# Patient Record
Sex: FEMALE | Race: WHITE | NOT HISPANIC OR LATINO | Employment: FULL TIME | URBAN - METROPOLITAN AREA
[De-identification: names, ages, dates, MRNs, and addresses within clinical notes are randomized per-mention and may not be internally consistent; named-entity substitution may affect disease eponyms.]

---

## 2017-01-01 ENCOUNTER — TRANSFERRED RECORDS (OUTPATIENT)
Dept: HEALTH INFORMATION MANAGEMENT | Facility: CLINIC | Age: 22
End: 2017-01-01

## 2017-01-01 LAB — PAP SMEAR - HIM PATIENT REPORTED: NEGATIVE

## 2017-04-06 ENCOUNTER — OFFICE VISIT (OUTPATIENT)
Dept: FAMILY MEDICINE | Facility: CLINIC | Age: 22
End: 2017-04-06
Payer: COMMERCIAL

## 2017-04-06 VITALS
TEMPERATURE: 97.4 F | WEIGHT: 213 LBS | DIASTOLIC BLOOD PRESSURE: 70 MMHG | SYSTOLIC BLOOD PRESSURE: 118 MMHG | BODY MASS INDEX: 36.68 KG/M2 | HEART RATE: 86 BPM | RESPIRATION RATE: 16 BRPM

## 2017-04-06 DIAGNOSIS — F41.1 GENERALIZED ANXIETY DISORDER: ICD-10-CM

## 2017-04-06 DIAGNOSIS — J45.30 MILD PERSISTENT ASTHMA WITHOUT COMPLICATION: ICD-10-CM

## 2017-04-06 DIAGNOSIS — A74.9 CHLAMYDIA INFECTION: ICD-10-CM

## 2017-04-06 DIAGNOSIS — J30.2 SEASONAL ALLERGIC RHINITIS, UNSPECIFIED ALLERGIC RHINITIS TRIGGER: ICD-10-CM

## 2017-04-06 DIAGNOSIS — F43.0 ACUTE REACTION TO STRESS: ICD-10-CM

## 2017-04-06 DIAGNOSIS — F33.0 MAJOR DEPRESSIVE DISORDER, RECURRENT EPISODE, MILD (H): Primary | ICD-10-CM

## 2017-04-06 PROCEDURE — 99214 OFFICE O/P EST MOD 30 MIN: CPT | Performed by: PHYSICIAN ASSISTANT

## 2017-04-06 PROCEDURE — 87491 CHLMYD TRACH DNA AMP PROBE: CPT | Performed by: PHYSICIAN ASSISTANT

## 2017-04-06 RX ORDER — VENLAFAXINE HYDROCHLORIDE 37.5 MG/1
37.5 CAPSULE, EXTENDED RELEASE ORAL DAILY
Qty: 14 CAPSULE | Refills: 0 | Status: SHIPPED | OUTPATIENT
Start: 2017-04-06 | End: 2017-04-11 | Stop reason: ALTCHOICE

## 2017-04-06 RX ORDER — ALBUTEROL SULFATE 90 UG/1
2 AEROSOL, METERED RESPIRATORY (INHALATION) EVERY 6 HOURS PRN
Qty: 2 INHALER | Refills: 3 | Status: SHIPPED | OUTPATIENT
Start: 2017-04-06 | End: 2019-03-06

## 2017-04-06 RX ORDER — FEXOFENADINE HCL 180 MG/1
180 TABLET ORAL DAILY
Qty: 90 TABLET | Refills: 3 | Status: SHIPPED | OUTPATIENT
Start: 2017-04-06 | End: 2018-01-16

## 2017-04-06 RX ORDER — ALBUTEROL SULFATE 90 UG/1
2 AEROSOL, METERED RESPIRATORY (INHALATION) EVERY 6 HOURS PRN
Qty: 1 INHALER | Refills: 0 | Status: CANCELLED | OUTPATIENT
Start: 2017-04-06

## 2017-04-06 RX ORDER — MONTELUKAST SODIUM 10 MG/1
TABLET ORAL
Qty: 90 TABLET | Refills: 3 | Status: SHIPPED | OUTPATIENT
Start: 2017-04-06 | End: 2018-01-16

## 2017-04-06 RX ORDER — MOMETASONE FUROATE MONOHYDRATE 50 UG/1
2 SPRAY, METERED NASAL DAILY
Qty: 3 BOX | Refills: 3 | Status: SHIPPED | OUTPATIENT
Start: 2017-04-06 | End: 2018-01-16

## 2017-04-06 RX ORDER — FLUOXETINE 10 MG/1
10 CAPSULE ORAL DAILY
Qty: 90 CAPSULE | Refills: 1 | Status: SHIPPED | OUTPATIENT
Start: 2017-04-06 | End: 2017-04-11 | Stop reason: SINTOL

## 2017-04-06 ASSESSMENT — PAIN SCALES - GENERAL: PAINLEVEL: NO PAIN (0)

## 2017-04-06 NOTE — NURSING NOTE
"Chief Complaint   Patient presents with     Depression     recheck     Allergies     refills       Initial /70 (BP Location: Right arm, Patient Position: Chair, Cuff Size: Adult Regular)  Pulse 86  Temp 97.4  F (36.3  C) (Tympanic)  Resp 16  Wt 213 lb (96.6 kg)  BMI 36.68 kg/m2 Estimated body mass index is 36.68 kg/(m^2) as calculated from the following:    Height as of 8/24/16: 5' 3.9\" (1.623 m).    Weight as of this encounter: 213 lb (96.6 kg).  Medication Reconciliation: complete   Marissa Colon CMA (AAMA)   "

## 2017-04-06 NOTE — PATIENT INSTRUCTIONS
Start Prozac 10mg daily.  Decrease Effexor to new dose of 37.5mg daily - use this once daily with the Prozac x 7 days. Second week take every other day x 7 days and then stop the med.

## 2017-04-06 NOTE — MR AVS SNAPSHOT
After Visit Summary   4/6/2017    Kena Sorto    MRN: [id number]           Patient Information     Date Of Birth          1995        Visit Information        Provider Department      4/6/2017 11:30 AM Darcy Duarte PA-C [facility]        Today's Diagnoses     Chlamydia infection    -  1    Mild persistent asthma without complication        Seasonal allergic rhinitis, unspecified allergic rhinitis trigger        Major depressive disorder, recurrent episode, mild (H)        Acute reaction to stress        Generalized anxiety disorder          Care Instructions    Start Prozac 10mg daily.  Decrease Effexor to new dose of 37.5mg daily - use this once daily with the Prozac x 7 days. Second week take every other day x 7 days and then stop the med.        Follow-ups after your visit        Your next 10 appointments already scheduled     May 08, 2017 10:30 AM CDT   Office Visit with Darcy Duarte PA-C   [facility]    [address] [place] [id number] [phone]           Bring a current list of meds and any records pertaining to this visit.  For Physicals, please bring immunization records and any forms needing to be filled out.  Please arrive 10 minutes early to complete paperwork.              Who to contact     If you have questions or need follow up information about today's clinic visit or your schedule please contact [facility] directly at [phone].  Normal or non-critical lab and imaging results will be communicated to you by MyChart, letter or phone within 4 business days after the clinic has received the results. If you do not hear from us within 7 days, please contact the clinic through MyChart or phone. If you have a critical or abnormal lab result, we will notify you by phone as soon as possible.  Submit refill requests through [employer] or call your pharmacy and they will forward the  "refill request to us. Please allow 3 business days for your refill to be completed.          Additional Information About Your Visit        [employer] Information     [employer] lets you send messages to your doctor, view your test results, renew your prescriptions, schedule appointments and more. To sign up, go to [email or web address] . Click on \"Log in\" on the left side of the screen, which will take you to the Welcome page. Then click on \"Sign up Now\" on the right side of the page.     You will be asked to enter the access code listed below, as well as some personal information. Please follow the directions to create your username and password.     Your access code is: [email or web address]  Expires: 2017 12:30 PM     Your access code will  in 90 days. If you need help or a new code, please call your [facility] clinic or [phone].        Care EveryWhere ID     This is your Care EveryWhere ID. This could be used by other organizations to access your [facility] medical records  [id number]        Your Vitals Were     Pulse Temperature Respirations BMI (Body Mass Index)          86 97.4  F (36.3  C) (Tympanic) 16 36.68 kg/m2         Blood Pressure from Last 3 Encounters:   17 118/70   16 120/70   16 108/68    Weight from Last 3 Encounters:   17 213 lb (96.6 kg)   16 222 lb (100.7 kg) (99 %)*   16 216 lb (98 kg) (98 %)*     * Growth percentiles are based on [facility] 2-20 Years data.              We Performed the Following     Chlamydia trachomatis PCR          Today's Medication Changes          These changes are accurate as of: 17 12:30 PM.  If you have any questions, ask your nurse or doctor.               Start taking these medicines.        Dose/Directions    FLUoxetine 10 MG capsule   Commonly known as:  PROzac   Used for:  Major depressive disorder, recurrent episode, mild (H), Generalized anxiety disorder, Acute reaction to stress        Dose:  10 mg   Take 1 capsule (10 mg) " by mouth daily May increase to 20mg daily after 2 weeks if needed.   Quantity:  90 capsule   Refills:  1       mometasone 50 MCG/ACT spray   Commonly known as:  NASONEX   Used for:  Seasonal allergic rhinitis, unspecified allergic rhinitis trigger        Dose:  2 spray   Spray 2 sprays into both nostrils daily   Quantity:  3 Box   Refills:  3         These medicines have changed or have updated prescriptions.        Dose/Directions    venlafaxine 37.5 MG 24 hr capsule   Commonly known as:  EFFEXOR XR   This may have changed:    - medication strength  - how much to take  - how to take this  - when to take this  - additional instructions   Used for:  Major depressive disorder, recurrent episode, mild (H), Acute reaction to stress, Generalized anxiety disorder        Dose:  37.5 mg   Take 1 capsule (37.5 mg) by mouth daily Take 1 capsule daily for 7 days, then every other day x 7 days. (tapering off of the medication)   Quantity:  14 capsule   Refills:  0            Where to get your medicines      These medications were sent to [id number] - [facility], [place] - [address]  [address] [address]     Phone:  [phone]     albuterol 108 (90 BASE) MCG/ACT Inhaler    fexofenadine 180 MG tablet    FLUoxetine 10 MG capsule    mometasone 50 MCG/ACT spray    montelukast 10 MG tablet    venlafaxine 37.5 MG 24 hr capsule                Primary Care Provider Office Phone # Fax #    Darcy Duarte PA-C [phone]       [address] [place] [address]        Thank you!     Thank you for choosing [facility]  for your care. Our goal is always to provide you with excellent care. Hearing back from our patients is one way we can continue to improve our services. Please take a few minutes to complete the written survey that you may receive in the mail after your visit with us. Thank you!             Your Updated Medication List - Protect others around  you: Learn how to safely use, store and throw away your medicines at www.disposemymeds.org.          This list is accurate as of: 4/6/17 12:30 PM.  Always use your most recent med list.                   Brand Name Dispense Instructions for use    albuterol 108 (90 BASE) MCG/ACT Inhaler    PROAIR HFA/PROVENTIL HFA/VENTOLIN HFA    2 Inhaler    Inhale 2 puffs into the lungs every 6 hours as needed for shortness of breath / dyspnea or wheezing       fexofenadine 180 MG tablet    ALLEGRA    90 tablet    Take 1 tablet (180 mg) by mouth daily       FLUoxetine 10 MG capsule    PROzac    90 capsule    Take 1 capsule (10 mg) by mouth daily May increase to 20mg daily after 2 weeks if needed.       mometasone 50 MCG/ACT spray    NASONEX    3 Box    Spray 2 sprays into both nostrils daily       montelukast 10 MG tablet    SINGULAIR    90 tablet    TAKE ONE TABLET BY MOUTH EVERY NIGHT AT BEDTIME( DUE FOR OFFICE VISIT)       venlafaxine 37.5 MG 24 hr capsule    EFFEXOR XR    14 capsule    Take 1 capsule (37.5 mg) by mouth daily Take 1 capsule daily for 7 days, then every other day x 7 days. (tapering off of the medication)

## 2017-04-06 NOTE — PROGRESS NOTES
"  SUBJECTIVE:                                                    Kena Sorto is a 22 year old female who presents to clinic today for the following health issues:      Depression/anxiety Followup  I last saw this patient in August. She was changed from Lexapro to Effexor as she was not having good response with the Lexapro. Thought possibly Effexor would work better for her as her mom and sister on this very successfully for both depression and anxiety. Unfortunately she has been noticing side effects of this medicine since that time and just hasn't had time to get in here or alert me that this was going on. States that she is getting daily headaches since starting the Effexor and also feels \"worked up all the time\". Feels like it is having an opposite effect. Requesting a change in medication.    Also had a positive Chlamydia test last summer and has not had retesting done. Did take the medications. Was asymptomatic at that time and has not had any new concerning symptoms. Needs to be retested.    Status since last visit: Stable     See PHQ-9 for current symptoms.  Other associated symptoms: None    Complicating factors:   Significant life event:  No   Current substance abuse:  None  Anxiety or Panic symptoms:  Yes    PHQ-9  English PHQ-9   Any Language            Amount of exercise or physical activity: 2-3 days/week for an average of 45-60 minutes    Problems taking medications regularly: No    Medication side effects: Headaches    Diet: regular (no restrictions)      Medication Followup of Allegra and singulair    Taking Medication as prescribed: yes    Side Effects:  None    Medication Helping Symptoms:  Yes    She is not using a nasal spray and a consistent basis. Uses an albuterol inhaler very rarely. Date she is just starting to notice significant symptoms and her 2 medications are not helping her upper respiratory symptoms. She is highly congested, no cough. She has not seen an allergist in the past " although it has been.        Problem list and histories reviewed & adjusted, as indicated.  Additional history: as documented    Past Medical History:   Diagnosis Date     Chronic adenoiditis 3/3/2010     Hypertrophy of nasal turbinates 3/3/2010     Iron deficiency anemia, unspecified     TREATED WITH IRON SUPPLEMENTS     Mild intermittent asthma      Plantar fasciitis 3/15/2012     Pneumonia, organism unspecified(486)     Pneumonia     Past Surgical History:   Procedure Laterality Date     HC THERAPUTIC FRACTURE INFER TURBINATE  03/30/10     TONSILLECTOMY & ADENOIDECTOMY  03/30/10    turbinoplasty     Social History   Substance Use Topics     Smoking status: Never Smoker     Smokeless tobacco: Never Used      Comment: no smokers in the household     Alcohol use No     No family history on file.     Allergies   Allergen Reactions     Keflex [Cephalexin Hcl] Hives     Penicillins      Current Outpatient Prescriptions   Medication Sig Dispense Refill     montelukast (SINGULAIR) 10 MG tablet TAKE ONE TABLET BY MOUTH EVERY NIGHT AT BEDTIME( DUE FOR OFFICE VISIT) 90 tablet 3     fexofenadine (ALLEGRA) 180 MG tablet Take 1 tablet (180 mg) by mouth daily 90 tablet 3     venlafaxine (EFFEXOR XR) 37.5 MG 24 hr capsule Take 1 capsule (37.5 mg) by mouth daily Take 1 capsule daily for 7 days, then every other day x 7 days. (tapering off of the medication) 14 capsule 0     FLUoxetine (PROZAC) 10 MG capsule Take 1 capsule (10 mg) by mouth daily May increase to 20mg daily after 2 weeks if needed. 90 capsule 1     mometasone (NASONEX) 50 MCG/ACT spray Spray 2 sprays into both nostrils daily 3 Box 3     albuterol (PROAIR HFA/PROVENTIL HFA/VENTOLIN HFA) 108 (90 BASE) MCG/ACT Inhaler Inhale 2 puffs into the lungs every 6 hours as needed for shortness of breath / dyspnea or wheezing 2 Inhaler 3     [DISCONTINUED] venlafaxine (EFFEXOR-XR) 75 MG 24 hr capsule TAKE ONE CAPSULE BY MOUTH ONCE DAILY 90 capsule 1     [DISCONTINUED]  montelukast (SINGULAIR) 10 MG tablet TAKE ONE TABLET BY MOUTH EVERY NIGHT AT BEDTIME( DUE FOR OFFICE VISIT) 90 tablet 3     [DISCONTINUED] albuterol (PROAIR HFA, PROVENTIL HFA, VENTOLIN HFA) 108 (90 BASE) MCG/ACT inhaler Inhale 2 puffs into the lungs every 6 hours as needed for shortness of breath / dyspnea or wheezing 1 Inhaler 0           Reviewed and updated as needed this visit by clinical staff  Allergies  Meds       Reviewed and updated as needed this visit by Provider         ROS:  Constitutional, HEENT, cardiovascular, pulmonary, gi and gu systems are negative, except as otherwise noted.    OBJECTIVE:                                                    /70 (BP Location: Right arm, Patient Position: Chair, Cuff Size: Adult Regular)  Pulse 86  Temp 97.4  F (36.3  C) (Tympanic)  Resp 16  Wt 213 lb (96.6 kg)  BMI 36.68 kg/m2  Body mass index is 36.68 kg/(m^2).   GENERAL: alert, no distress and over weight  HEENT: Ears-TMs are bubbly, no erythema. Nose is very congested, pale, boggy clear rhinorrhea. Oropharynx appears normal although there is increased postnasal drainage posterior.  NECK: no adenopathy, no asymmetry, masses, or scars and thyroid normal to palpation  RESP: lungs clear to auscultation - no rales, rhonchi or wheezes  CV: regular rate and rhythm, normal S1 S2, no S3 or S4, no murmur, click or rub, no peripheral edema and peripheral pulses strong  ABDOMEN: soft, nontender, no hepatosplenomegaly, no masses and bowel sounds normal  MS: no gross musculoskeletal defects noted, no edema  SKIN: no suspicious lesions or rashes    Diagnostic Test Results:  Urine chlamydia test pending      ASSESSMENT:                                                       Mild persistent asthma without complication  Seasonal allergic rhinitis, unspecified allergic rhinitis trigger  Major depressive disorder, recurrent episode, mild (H)  Acute reaction to stress  Generalized anxiety disorder  Chlamydia  infection      PLAN:                                                        ICD-10-CM    1. Major depressive disorder, recurrent episode, mild (H) F33.0 venlafaxine (EFFEXOR XR) 37.5 MG 24 hr capsule     FLUoxetine (PROZAC) 10 MG capsule   2. Mild persistent asthma without complication J45.30 montelukast (SINGULAIR) 10 MG tablet     albuterol (PROAIR HFA/PROVENTIL HFA/VENTOLIN HFA) 108 (90 BASE) MCG/ACT Inhaler   3. Seasonal allergic rhinitis, unspecified allergic rhinitis trigger J30.2 fexofenadine (ALLEGRA) 180 MG tablet     mometasone (NASONEX) 50 MCG/ACT spray   4. Acute reaction to stress F43.0 venlafaxine (EFFEXOR XR) 37.5 MG 24 hr capsule     FLUoxetine (PROZAC) 10 MG capsule   5. Generalized anxiety disorder F41.1 venlafaxine (EFFEXOR XR) 37.5 MG 24 hr capsule     FLUoxetine (PROZAC) 10 MG capsule   6. Chlamydia infection A74.9 Chlamydia trachomatis PCR           I reviewed different options for medication management-she is having side effects with the Effexor and unfortunately has stayed on this for quite some time without alerting me of this. Will have her start a taper on the Effexor down to 37.5 mg daily while she initiates a new medication. Start Prozac 10 mg daily. Week 2 on her taper down on the Effexor will do every other day and continue with Prozac at 10 mg daily. Suggested increase in Prozac if she feels she should do this week 3-4. I will see her back in clinic in 4 weeks for recheck on this. I did offer [facility] Calle as a resource today, she declines this offer. She lives in [place] [facility] and it is difficult for her to get to [place]. I did tell her that there are some great resources up in that community-she may want to check into these.    We'll have her continue with Allegra and Singulair she is doing. Did refill her inhaler which she uses rarely. Suggested that she start a steroid nasal spray and a consistent basis-may need a decongestant which is safe for her to use over the course of  the next week to help with her significant congestion until the nasal spray has a chance to start working. Reminded patient be used on a daily basis. May need to consider seen an allergist in the future-she will think about this and let me know.    Darcy Duarte PA-C  [facility]    GREATER THAN 50% OF TIME SPENT IN COUNSELING & CARE COORDINATION - TOTAL FACE TO FACE TIME  30 MINUTES.    Orders Placed This Encounter     montelukast (SINGULAIR) 10 MG tablet     fexofenadine (ALLEGRA) 180 MG tablet     venlafaxine (EFFEXOR XR) 37.5 MG 24 hr capsule     FLUoxetine (PROZAC) 10 MG capsule     mometasone (NASONEX) 50 MCG/ACT spray     albuterol (PROAIR HFA/PROVENTIL HFA/VENTOLIN HFA) 108 (90 BASE) MCG/ACT Inhaler       Chart documentation done in part with Dragon Voice recognition Software. Although reviewed after completion, some word and grammatical error may remain.  AVS given to patient upon discharge today.  Electronically signed by Darcy Duarte PA-C  April 6, 2017  1:57 PM

## 2017-04-07 LAB
C TRACH DNA SPEC QL NAA+PROBE: NORMAL
SPECIMEN SOURCE: NORMAL

## 2017-04-07 ASSESSMENT — PATIENT HEALTH QUESTIONNAIRE - PHQ9: SUM OF ALL RESPONSES TO PHQ QUESTIONS 1-9: 17

## 2017-04-11 DIAGNOSIS — F41.1 GENERALIZED ANXIETY DISORDER: ICD-10-CM

## 2017-04-11 DIAGNOSIS — F43.0 ACUTE REACTION TO STRESS: ICD-10-CM

## 2017-04-11 DIAGNOSIS — F33.0 MAJOR DEPRESSIVE DISORDER, RECURRENT EPISODE, MILD (H): Primary | ICD-10-CM

## 2017-04-11 DIAGNOSIS — R11.0 NAUSEA: ICD-10-CM

## 2017-04-11 DIAGNOSIS — K59.00 CONSTIPATION, UNSPECIFIED CONSTIPATION TYPE: ICD-10-CM

## 2017-04-11 RX ORDER — ONDANSETRON 4 MG/1
4-8 TABLET, ORALLY DISINTEGRATING ORAL
Qty: 20 TABLET | Refills: 1 | Status: SHIPPED | OUTPATIENT
Start: 2017-04-11 | End: 2018-06-25

## 2017-04-11 RX ORDER — POLYETHYLENE GLYCOL 3350 17 G/17G
1 POWDER, FOR SOLUTION ORAL DAILY
Qty: 510 G | Refills: 1 | Status: ON HOLD | OUTPATIENT
Start: 2017-04-11 | End: 2018-07-02

## 2017-04-11 NOTE — TELEPHONE ENCOUNTER
Please let her know that I sent Zoloft to her pharmacy in [facility]. I advised that she take this at nighttime due to sedating side effects for some people. We'll start at a low dose, and she should let me know in 3-4 weeks how she is doing.    Electronically signed by Darcy Duarte PA-C  4/11/2017

## 2017-04-11 NOTE — TELEPHONE ENCOUNTER
Reason for Call:  Medication or medication refill:    Do you use a [facility]?  Name of the pharmacy and phone number for the current request:  [facility]     Name of the medication requested:     Other request: patient is calling stating that she started FLUoxetine (PROZAC) 10 MG capsule on April 7th and it is making her sick. She feels like she going to throw up. Requesting something new     Can we leave a detailed message on this number? YES    Phone number patient can be reached at: Home number on file [phone] (home)    Best Time: any    Call taken on 4/11/2017 at 2:46 PM by Anni Cosme

## 2017-04-18 ENCOUNTER — TRANSFERRED RECORDS (OUTPATIENT)
Dept: HEALTH INFORMATION MANAGEMENT | Facility: CLINIC | Age: 22
End: 2017-04-18

## 2017-06-07 DIAGNOSIS — F33.0 MAJOR DEPRESSIVE DISORDER, RECURRENT EPISODE, MILD (H): Primary | ICD-10-CM

## 2017-06-08 NOTE — TELEPHONE ENCOUNTER
Called pt and left a msg that I want to make sure that she is wanting the Prozac again cause we stopped it because of side effects. And that we can get her enough to get her to her appt.  Chantal Bland MA

## 2017-06-08 NOTE — TELEPHONE ENCOUNTER
I called pt back and she states that she is currently taking Prozac 20 mg daily. I informed her that we will give her enough to get to her appt next month.  Chantal Bland MA

## 2017-06-08 NOTE — TELEPHONE ENCOUNTER
Patient is scheduled to see Darcy on 07/12/17. She was supposed to see Darcy today but she was asked to reschedule the appointment. 07/12/17 is one of Darcy's next available appointment dates as of today. Patient is out of medication for antidepressant. Can she get a refill for fluoxetine 20mg, enough to last until she comes in for her next appointment? Preferred pharmacy is "[facility] on [facility]. If not, can patient be worked in as soon as possible with Darcy? Please call to discuss. Okay to leave detailed messages.    Lalita WARD  Central Scheduler

## 2017-07-04 ENCOUNTER — TRANSFERRED RECORDS (OUTPATIENT)
Dept: HEALTH INFORMATION MANAGEMENT | Facility: CLINIC | Age: 22
End: 2017-07-04

## 2017-07-23 ENCOUNTER — TRANSFERRED RECORDS (OUTPATIENT)
Dept: HEALTH INFORMATION MANAGEMENT | Facility: CLINIC | Age: 22
End: 2017-07-23

## 2017-08-28 DIAGNOSIS — F33.0 MAJOR DEPRESSIVE DISORDER, RECURRENT EPISODE, MILD (H): ICD-10-CM

## 2017-08-28 NOTE — TELEPHONE ENCOUNTER
prozac     Last Written Prescription Date: 06/08/17  Last Fill Quantity: 60, # refills: 0  Last Office Visit with [facility] primary care provider:  04/06/17        Last PHQ-9 score on record=   PHQ-9 SCORE 4/6/2017   Total Score -   Total Score 17

## 2017-08-28 NOTE — TELEPHONE ENCOUNTER
Patient is calling and requesting this be filled today because she is out as of today. Please advise

## 2017-08-29 NOTE — TELEPHONE ENCOUNTER
Prozac      Last Written Prescription Date: 6/8/2017  Last Fill Quantity: 60, # refills: 0  Last Office Visit with [facility] primary care provider:  4/6/2017        Last PHQ-9 score on record=   PHQ-9 SCORE 4/6/2017   Total Score -   Total Score 17

## 2017-08-29 NOTE — TELEPHONE ENCOUNTER
Darcy Duarte PA-C   You 3 minutes ago (8:00 AM)                 She was due for a visit in May for follow-up on Prozac which was initiated in April. I filled one month today-she will either need to start a my chart account and doing a visit for refills or come into the clinic for follow-up. (Routing comment)

## 2017-08-29 NOTE — TELEPHONE ENCOUNTER
Left message for patient to call back and speak with any .    Thank you,  Sheila Trejo   for [facility]

## 2017-10-16 ENCOUNTER — TELEPHONE (OUTPATIENT)
Dept: FAMILY MEDICINE | Facility: CLINIC | Age: 22
End: 2017-10-16

## 2017-10-16 NOTE — TELEPHONE ENCOUNTER
Patient is due for a PHQ-9.  Index start date:9/06/2017  Index end date:11/06/2017    Please call patient. Pt is due for an office visit for a recheck on her Prozac. Please assist pt with scheduling an appt and update PHQ-9.

## 2017-10-23 ENCOUNTER — TRANSFERRED RECORDS (OUTPATIENT)
Dept: HEALTH INFORMATION MANAGEMENT | Facility: CLINIC | Age: 22
End: 2017-10-23

## 2017-10-24 NOTE — TELEPHONE ENCOUNTER
I have attempted to call the pt to update a PHQ-9. I left message for pt to call back. I will call back another time. Yohana Dorado CMA ([facility])

## 2017-10-27 ASSESSMENT — PATIENT HEALTH QUESTIONNAIRE - PHQ9: SUM OF ALL RESPONSES TO PHQ QUESTIONS 1-9: 7

## 2017-10-27 NOTE — TELEPHONE ENCOUNTER
Pt returned call and updated PHQ-9. I will route to PCP as FYI as score is above 5. Yohana Dorado CMA ([facility])

## 2017-10-27 NOTE — TELEPHONE ENCOUNTER
I have attempted to call the pt to update a PHQ-9. I left message for pt to call back. I will call back another time. Yohana Dorado CMA ([facility])

## 2017-12-14 ENCOUNTER — TELEPHONE (OUTPATIENT)
Dept: FAMILY MEDICINE | Facility: CLINIC | Age: 22
End: 2017-12-14

## 2018-01-08 ENCOUNTER — PRENATAL OFFICE VISIT (OUTPATIENT)
Dept: FAMILY MEDICINE | Facility: CLINIC | Age: 23
End: 2018-01-08
Payer: COMMERCIAL

## 2018-01-08 VITALS — HEIGHT: 64 IN | BODY MASS INDEX: 35.17 KG/M2 | WEIGHT: 206 LBS

## 2018-01-08 DIAGNOSIS — Z34.00 PREGNANCY, FIRST: Primary | ICD-10-CM

## 2018-01-08 PROCEDURE — 99207 ZZC NO CHARGE NURSE ONLY: CPT

## 2018-01-08 RX ORDER — PRENATAL VIT/IRON FUM/FOLIC AC 27MG-0.8MG
1 TABLET ORAL DAILY
COMMUNITY
End: 2018-07-05

## 2018-01-08 NOTE — PROGRESS NOTES
1. Have you had chicken pox?   Yes    Genetic Screening    Has the patient, baby's father, or anyone in either family had:     1. Thalassemia and and MCV result less than 80?No   2.  Neural tube defect? No   3.  Congenital heart defect?No   4. Down's syndrome?No   5.  Riaz-Sachs disease?No   6. Sickle Cell disease or trait?No   7. Hemophilia or other inherited problems of blood?No   8. Muscular dystropy?No   9.  Cystic fibrosis?No  10. [place]'s Chorea?No  11. Mental Retardation or autism?  No         If yes, was the person tested for Fragile X?   12. Any other inherited genetic or chromosomal disorders?No  13. Metabolic disorders such as diabetes or PKU?No  14. A child born with defects not listed above?No  15. Recurrent pregnancy loss or stillbirth?No  16.  Has the patient had any medications/street drugs/alcohol since her last menstrual period?No  18.  Does the patient or baby's father have any other genetic risks. No

## 2018-01-08 NOTE — MR AVS SNAPSHOT
"              After Visit Summary   1/8/2018    Kena Sorto    MRN: [id number]           Patient Information     Date Of Birth          1995        Visit Information        Provider Department      1/8/2018 1:00 PM NL OB INTAKE [facility]        Today's Diagnoses     Pregnancy, first    -  1       Follow-ups after your visit        Your next 10 appointments already scheduled     Jan 16, 2018  8:15 AM CST   New Prenatal with Darcy Duarte PA-C   [facility]    [address] [place] [address] [phone]              Who to contact     If you have questions or need follow up information about today's clinic visit or your schedule please contact [facility] directly at [phone].  Normal or non-critical lab and imaging results will be communicated to you by MyChart, letter or phone within 4 business days after the clinic has received the results. If you do not hear from us within 7 days, please contact the clinic through MyChart or phone. If you have a critical or abnormal lab result, we will notify you by phone as soon as possible.  Submit refill requests through [employer] or call your pharmacy and they will forward the refill request to us. Please allow 3 business days for your refill to be completed.          Additional Information About Your Visit        [employer] Information     [employer] lets you send messages to your doctor, view your test results, renew your prescriptions, schedule appointments and more. To sign up, go to [email or web address] . Click on \"Log in\" on the left side of the screen, which will take you to the Welcome page. Then click on \"Sign up Now\" on the right side of the page.     You will be asked to enter the access code listed below, as well as some personal information. Please follow the directions to create your username and password.     Your access code is: [email or web address]  Expires: 4/8/2018 " " 1:46 PM     Your access code will  in 90 days. If you need help or a new code, please call your [place] clinic or [phone].        Care EveryWhere ID     This is your Care EveryWhere ID. This could be used by other organizations to access your [place] medical records  [id number]        Your Vitals Were     Height Last Period BMI (Body Mass Index)             5' 4\" (1.626 m) 10/02/2017 35.36 kg/m2          Blood Pressure from Last 3 Encounters:   17 118/70   16 120/70   16 108/68    Weight from Last 3 Encounters:   18 206 lb (93.4 kg)   17 213 lb (96.6 kg)   16 222 lb (100.7 kg) (99 %)*     * Growth percentiles are based on [facility] 2-20 Years data.              Today, you had the following     No orders found for display       Primary Care Provider Office Phone # Fax #    Darcy Duarte PA-C [phone]       [address] [place] [address]        Equal Access to Services     JOSE MOORE : Hadii aad ku hadasho Soomaali, waaxda luqadaha, qaybta kaalmada adeegyada, nino doll . So [facility] [phone].    ATENCIÓN: Si habla español, tiene a bernal disposición servicios gratuitos de asistencia lingüística. LlSelect Medical [facility] [phone].    We comply with applicable federal civil rights laws and [place] laws. We do not discriminate on the basis of race, color, national origin, age, disability, sex, sexual orientation, or gender identity.            Thank you!     Thank you for choosing [facility]  for your care. Our goal is always to provide you with excellent care. Hearing back from our patients is one way we can continue to improve our services. Please take a few minutes to complete the written survey that you may receive in the mail after your visit with us. Thank you!             Your Updated Medication List - Protect others around you: Learn how to safely use, store and throw away your medicines at " www.disposemymeds.org.          This list is accurate as of: 1/8/18  1:46 PM.  Always use your most recent med list.                   Brand Name Dispense Instructions for use Diagnosis    albuterol 108 (90 BASE) MCG/ACT Inhaler    PROAIR HFA/PROVENTIL HFA/VENTOLIN HFA    2 Inhaler    Inhale 2 puffs into the lungs every 6 hours as needed for shortness of breath / dyspnea or wheezing    Mild persistent asthma without complication       fexofenadine 180 MG tablet    ALLEGRA    90 tablet    Take 1 tablet (180 mg) by mouth daily    Seasonal allergic rhinitis, unspecified allergic rhinitis trigger       FLUoxetine 20 MG capsule    PROzac    30 capsule    TAKE ONE CAPSULE BY MOUTH ONCE DAILY    Major depressive disorder, recurrent episode, mild (H)       mometasone 50 MCG/ACT spray    NASONEX    3 Box    Spray 2 sprays into both nostrils daily    Seasonal allergic rhinitis, unspecified allergic rhinitis trigger       montelukast 10 MG tablet    SINGULAIR    90 tablet    TAKE ONE TABLET BY MOUTH EVERY NIGHT AT BEDTIME( DUE FOR OFFICE VISIT)    Mild persistent asthma without complication       ondansetron 4 MG ODT tab    ZOFRAN ODT    20 tablet    Take 1-2 tablets (4-8 mg) by mouth 3 times daily (before meals)    Nausea       polyethylene glycol powder    MIRALAX    510 g    Take 17 g (1 capful) by mouth daily    Constipation, unspecified constipation type       prenatal multivitamin plus iron 27-0.8 MG Tabs per tablet      Take 1 tablet by mouth daily

## 2018-01-16 ENCOUNTER — PRENATAL OFFICE VISIT (OUTPATIENT)
Dept: FAMILY MEDICINE | Facility: CLINIC | Age: 23
End: 2018-01-16
Payer: COMMERCIAL

## 2018-01-16 VITALS
DIASTOLIC BLOOD PRESSURE: 72 MMHG | OXYGEN SATURATION: 98 % | SYSTOLIC BLOOD PRESSURE: 122 MMHG | RESPIRATION RATE: 18 BRPM | TEMPERATURE: 98 F | HEART RATE: 100 BPM | WEIGHT: 207 LBS | BODY MASS INDEX: 35.53 KG/M2

## 2018-01-16 DIAGNOSIS — Z34.00 ENCOUNTER FOR SUPERVISION OF NORMAL FIRST PREGNANCY, UNSPECIFIED TRIMESTER: Primary | ICD-10-CM

## 2018-01-16 DIAGNOSIS — J45.30 MILD PERSISTENT ASTHMA WITHOUT COMPLICATION: ICD-10-CM

## 2018-01-16 DIAGNOSIS — J30.2 SEASONAL ALLERGIC RHINITIS, UNSPECIFIED CHRONICITY, UNSPECIFIED TRIGGER: ICD-10-CM

## 2018-01-16 DIAGNOSIS — F33.0 MAJOR DEPRESSIVE DISORDER, RECURRENT EPISODE, MILD (H): ICD-10-CM

## 2018-01-16 PROCEDURE — 99000 SPECIMEN HANDLING OFFICE-LAB: CPT | Performed by: PHYSICIAN ASSISTANT

## 2018-01-16 PROCEDURE — 81511 FTL CGEN ABNOR FOUR ANAL: CPT | Mod: 90 | Performed by: PHYSICIAN ASSISTANT

## 2018-01-16 PROCEDURE — 36415 COLL VENOUS BLD VENIPUNCTURE: CPT | Performed by: PHYSICIAN ASSISTANT

## 2018-01-16 PROCEDURE — 99207 ZZC FIRST OB VISIT: CPT | Performed by: PHYSICIAN ASSISTANT

## 2018-01-16 PROCEDURE — 99214 OFFICE O/P EST MOD 30 MIN: CPT | Mod: 25 | Performed by: PHYSICIAN ASSISTANT

## 2018-01-16 RX ORDER — CETIRIZINE HYDROCHLORIDE 10 MG/1
10 TABLET ORAL DAILY
Qty: 90 TABLET | Refills: 3 | Status: SHIPPED | OUTPATIENT
Start: 2018-01-16 | End: 2020-09-02

## 2018-01-16 RX ORDER — MOMETASONE FUROATE MONOHYDRATE 50 UG/1
2 SPRAY, METERED NASAL DAILY
Qty: 3 BOX | Refills: 3 | Status: SHIPPED | OUTPATIENT
Start: 2018-01-16 | End: 2019-12-27

## 2018-01-16 RX ORDER — LORATADINE 10 MG/1
10 CAPSULE, LIQUID FILLED ORAL DAILY
COMMUNITY
End: 2018-05-01

## 2018-01-16 RX ORDER — BUDESONIDE AND FORMOTEROL FUMARATE DIHYDRATE 160; 4.5 UG/1; UG/1
2 AEROSOL RESPIRATORY (INHALATION) 2 TIMES DAILY
Qty: 3 INHALER | Refills: 3 | Status: SHIPPED | OUTPATIENT
Start: 2018-01-16 | End: 2020-02-05

## 2018-01-16 ASSESSMENT — PAIN SCALES - GENERAL: PAINLEVEL: NO PAIN (0)

## 2018-01-16 NOTE — MR AVS SNAPSHOT
After Visit Summary   1/16/2018    Kena Sorto    MRN: [id number]           Patient Information     Date Of Birth          1995        Visit Information        Provider Department      1/16/2018 9:00 AM Darcy Duarte PA-C [facility]        Today's Diagnoses     Encounter for supervision of normal first pregnancy, unspecified trimester    -  1    Major depressive disorder, recurrent episode, mild (H)        Mild persistent asthma without complication        Seasonal allergic rhinitis, unspecified chronicity, unspecified trigger           Follow-ups after your visit        Your next 10 appointments already scheduled     Feb 19, 2018  9:00 AM CST   (Arrive by 8:45 AM)   US OB > 14 WEEKS COMPLETE SINGLE with PHUS1   [facility] ([facility])    [address] [place] [id number] [phone]           Please bring a list of your medicines (including vitamins, minerals and over-the-counter drugs). Also, tell your doctor about any allergies you may have. Wear comfortable clothes and leave your valuables at home.  If you re less than 20 weeks drink four 8-ounce glasses of fluid an hour before your exam. If you need to empty your bladder before your exam, try to release only a little urine. Then, drink another glass of fluid.  You may have up to two family members in the exam room. If you bring a small child, an adult must be there to care for him or her.  Please call the Imaging Department at your exam site with any questions.            Feb 19, 2018 10:00 AM CST   ESTABLISHED PRENATAL with Darcy Duarte PA-C   [facility]    [address] [id number] [phone]            Mar 19, 2018 10:30 AM CDT   ESTABLISHED PRENATAL with Darcy Duarte PA-C   [facility] ([facility]     [facility] [address]    [phone]            Apr 16, 2018 10:30 AM CDT   ESTABLISHED PRENATAL with Darcy Duarte PA-C   [facility]    [address] [place] [address]   [phone]            Apr 30, 2018 10:30 AM CDT   ESTABLISHED PRENATAL with DAISY Chaudhry-C   [facility] ([facility])    [address] [place] [address]   [phone]            May 14, 2018 10:30 AM CDT   ESTABLISHED PRENATAL with DAISY Chaudhry-C   [facility] ([facility])    [address] [place] [address]   [phone]            May 29, 2018 10:30 AM CDT   ESTABLISHED PRENATAL with DAISY Chaudhry-C   [facility])    [address] [place] [address]   [phone]            Jun 13, 2018 10:30 AM CDT   ESTABLISHED PRENATAL with DAISY Chaudhry-JARVIS   [facility])    [address] [place] [address]   [phone]            Jun 18, 2018 10:30 AM CDT   ESTABLISHED PRENATAL with DAISY Chaudhry-JARVIS   [facility])    [address] [place] [address]   [phone]            Jun 25, 2018 10:30 AM CDT   ESTABLISHED PRENATAL with DAISY Chaudhry-JARVIS   [facility] ([facility])    [address] [place] [address]   [phone]              Who to contact     If you have questions or need follow up information about today's clinic visit or your schedule please contact [facility] directly at [phone].  Normal or non-critical lab and imaging results will be communicated to you by MyChart, letter or phone within 4 business days after the clinic has received the results. If you do not hear from us within 7 days, please contact the clinic through MyChart or phone. If  "you have a critical or abnormal lab result, we will notify you by phone as soon as possible.  Submit refill requests through [employer] or call your pharmacy and they will forward the refill request to us. Please allow 3 business days for your refill to be completed.          Additional Information About Your Visit        [employer] Information     [employer] lets you send messages to your doctor, view your test results, renew your prescriptions, schedule appointments and more. To sign up, go to [email or web address] . Click on \"Log in\" on the left side of the screen, which will take you to the Welcome page. Then click on \"Sign up Now\" on the right side of the page.     You will be asked to enter the access code listed below, as well as some personal information. Please follow the directions to create your username and password.     Your access code is: [email or web address]  Expires: 2018  1:46 PM     Your access code will  in 90 days. If you need help or a new code, please call your [place] clinic or [phone].        Care EveryWhere ID     This is your Care EveryWhere ID. This could be used by other organizations to access your [place] medical records  [id number]        Your Vitals Were     Pulse Temperature Respirations Last Period Pulse Oximetry BMI (Body Mass Index)    100 98  F (36.7  C) (Tympanic) 18 10/02/2017 98% 35.53 kg/m2       Blood Pressure from Last 3 Encounters:   18 122/72   17 118/70   16 120/70    Weight from Last 3 Encounters:   18 207 lb (93.9 kg)   18 206 lb (93.4 kg)   17 213 lb (96.6 kg)              We Performed the Following     Asthma Action Plan (AAP)     DEPRESSION ACTION PLAN (DAP)     Maternal quad screen          Today's Medication Changes          These changes are accurate as of: 18 11:59 PM.  If you have any questions, ask your nurse or doctor.               Start taking these medicines.        Dose/Directions    budesonide-formoterol " 160-4.5 MCG/ACT Inhaler   Commonly known as:  SYMBICORT   Used for:  Mild persistent asthma without complication   Started by:  Darcy Duarte PA-C        Dose:  2 puff   Inhale 2 puffs into the lungs 2 times daily   Quantity:  3 Inhaler   Refills:  3       cetirizine 10 MG tablet   Commonly known as:  ZYRTEC ALLERGY   Used for:  Seasonal allergic rhinitis, unspecified chronicity, unspecified trigger   Started by:  Darcy Duarte PA-C        Dose:  10 mg   Take 1 tablet (10 mg) by mouth daily   Quantity:  90 tablet   Refills:  3            Where to get your medicines      These medications were sent to [id number] -  [place], [place] - [address]  [address], [facility] [place] [address]     Phone:  [phone]     budesonide-formoterol 160-4.5 MCG/ACT Inhaler    cetirizine 10 MG tablet    mometasone 50 MCG/ACT spray                Primary Care Provider Office Phone # Fax #    Darcy Duarte PA-C [phone] [address] [place] [address]        Equal Access to Services     [facility]: Hadii aad ku hadasho Soomaali, waaxda luqadaha, qaybta kaalmada adeegyada, nino doll . So [facility] [phone].    ATENCIÓN: Si habla español, tiene a bernal disposición servicios gratuitos de asistencia lingüística. [facility] [phone].    We comply with applicable federal civil rights laws and [place] laws. We do not discriminate on the basis of race, color, national origin, age, disability, sex, sexual orientation, or gender identity.            Thank you!     Thank you for choosing [facility]  for your care. Our goal is always to provide you with excellent care. Hearing back from our patients is one way we can continue to improve our services. Please take a few minutes to complete the written survey that you may receive in the mail after your visit with us. Thank you!             Your Updated Medication List - Protect others around you: Learn how  to safely use, store and throw away your medicines at www.disposemymeds.org.          This list is accurate as of: 1/16/18 11:59 PM.  Always use your most recent med list.                   Brand Name Dispense Instructions for use Diagnosis    albuterol 108 (90 BASE) MCG/ACT Inhaler    PROAIR HFA/PROVENTIL HFA/VENTOLIN HFA    2 Inhaler    Inhale 2 puffs into the lungs every 6 hours as needed for shortness of breath / dyspnea or wheezing    Mild persistent asthma without complication       budesonide-formoterol 160-4.5 MCG/ACT Inhaler    SYMBICORT    3 Inhaler    Inhale 2 puffs into the lungs 2 times daily    Mild persistent asthma without complication       cetirizine 10 MG tablet    ZYRTEC ALLERGY    90 tablet    Take 1 tablet (10 mg) by mouth daily    Seasonal allergic rhinitis, unspecified chronicity, unspecified trigger       CLARITIN 10 MG capsule   Generic drug:  loratadine      Take 10 mg by mouth daily        mometasone 50 MCG/ACT spray    NASONEX    3 Box    Spray 2 sprays into both nostrils daily    Seasonal allergic rhinitis, unspecified chronicity, unspecified trigger       ondansetron 4 MG ODT tab    ZOFRAN ODT    20 tablet    Take 1-2 tablets (4-8 mg) by mouth 3 times daily (before meals)    Nausea       polyethylene glycol powder    MIRALAX    510 g    Take 17 g (1 capful) by mouth daily    Constipation, unspecified constipation type       prenatal multivitamin plus iron 27-0.8 MG Tabs per tablet      Take 1 tablet by mouth daily

## 2018-01-16 NOTE — NURSING NOTE
"Chief Complaint   Patient presents with     Prenatal Care       Initial /72  Pulse 100  Temp 98  F (36.7  C) (Tympanic)  Resp 18  Wt 207 lb (93.9 kg)  LMP 10/02/2017  SpO2 98%  BMI 35.53 kg/m2 Estimated body mass index is 35.53 kg/(m^2) as calculated from the following:    Height as of 1/8/18: 5' 4\" (1.626 m).    Weight as of this encounter: 207 lb (93.9 kg).  BP completed using cuff size: levy Bland MA      "

## 2018-01-16 NOTE — PROGRESS NOTES
SUBJECTIVE:   Kena Sorto is a 22 year old female currently 15 weeks pregnant who presents to clinic today for the following health issues:      Asthma Follow-Up has concerns that her asthma is not doing well particularly at nighttime.  States that when she found out she was pregnant she was told by the provider at [facility] to stop all medications-at that time she was stable somewhat using allergy medications including Allegra and Singulair.  She was using pro-air as needed and was not using a nasal spray consistently.  Her fiancé who is with her today states that they do have cats and it is very obvious that she is allergic to them.  She has significant upper respiratory symptoms as well that she is not managing her treating as she really was not sure what was safe with pregnancy.  She has been struggling with wheezing particularly when at home and has awakened several nights and used her inhaler because she feels very wheezy.  She states the cats do not sleep with her.    Was ACT completed today?    Yes    ACT Total Scores 1/16/2018   ACT TOTAL SCORE -   ASTHMA ER VISITS -   ASTHMA HOSPITALIZATIONS -   ACT TOTAL SCORE (Goal Greater than or Equal to 20) 8   In the past 12 months, how many times did you visit the emergency room for your asthma without being admitted to the hospital? 0   In the past 12 months, how many times were you hospitalized overnight because of your asthma? 0       Recent asthma triggers that patient is dealing with: Possibly cats otherwise unaware of all triggers.            Amount of exercise or physical activity: None    Problems taking medications regularly: No    Medication side effects: none  Diet: regular (no restrictions)      Problem list and histories reviewed & adjusted, as indicated.  Additional history: as documented    Patient Active Problem List   Diagnosis     Attention deficit disorder     Generalized anxiety disorder     Chronic adenoiditis     Hypertrophy of nasal  turbinates     Obesity     Geographic tongue     Acute reaction to stress     Major depressive disorder, recurrent episode, mild (H)     Mild persistent asthma without complication     Seasonal allergic rhinitis     Constipation, unspecified constipation type     Encounter for supervision of normal first pregnancy, unspecified trimester     Past Surgical History:   Procedure Laterality Date     HC THERAPUTIC FRACTURE INFER TURBINATE  03/30/10     TONSILLECTOMY & ADENOIDECTOMY  03/30/10    turbinoplasty       Social History   Substance Use Topics     Smoking status: Never Smoker     Smokeless tobacco: Never Used      Comment: no smokers in the household     Alcohol use No     Family History   Problem Relation Age of Onset     Hypertension Mother      Hyperlipidemia Mother      GASTROINTESTINAL DISEASE Father      HEART DISEASE Father      Back Pain Father      Anxiety Disorder Sister      Depression Sister      Hearing Loss Sister      congenital     Coronary Artery Disease Maternal Grandmother      CEREBROVASCULAR DISEASE Maternal Grandmother      DIABETES Maternal Grandfather      Type II     CEREBROVASCULAR DISEASE Maternal Grandfather      CANCER Paternal Grandmother      lung (she was a smoker)     Coronary Artery Disease Paternal Grandfather      aortic aneurysm             Reviewed and updated as needed this visit by clinical staffTobacco  Allergies  Meds       Reviewed and updated as needed this visit by Provider         ROS:  Constitutional, HEENT, cardiovascular, pulmonary, gi and gu systems are negative, except as otherwise noted.      OBJECTIVE:   /72  Pulse 100  Temp 98  F (36.7  C) (Tympanic)  Resp 18  Wt 207 lb (93.9 kg)  LMP 10/02/2017  SpO2 98%  BMI 35.53 kg/m2  Body mass index is 35.53 kg/(m^2).   GENERAL: alert, no distress and obese  EYES: Eyes grossly normal to inspection, PERRL and conjunctivae and sclerae normal  HENT: ear canals and TM's normal, nasal mucosa edematous, pale,  boggy, congested , rhinorrhea clear, oropharynx clear, oral mucous membranes moist and increased pnd  NECK: no adenopathy, no asymmetry, masses, or scars and thyroid normal to palpation  RESP: Very mild wheezing anterior, otherwise lungs are clear today without rales or rhonchi.  CV: regular rate and rhythm, normal S1 S2, no S3 or S4, no murmur, click or rub, no peripheral edema and peripheral pulses strong  MS: no gross musculoskeletal defects noted, no edema  SKIN: no suspicious lesions or rashes    Diagnostic Test Results:  none     ASSESSMENT:      Major depressive disorder, recurrent episode, mild (H)  Mild persistent asthma without complication  Seasonal allergic rhinitis, unspecified chronicity, unspecified trigger  Encounter for supervision of normal first pregnancy, unspecified trimester      PLAN:       ICD-10-CM    1. Encounter for supervision of normal first pregnancy, unspecified trimester Z34.00 Maternal quad screen     US OB > 14 Weeks Complete Single   2. Major depressive disorder, recurrent episode, mild (H) F33.0 DEPRESSION ACTION PLAN (DAP)   3. Mild persistent asthma without complication J45.30 Asthma Action Plan (AAP)     budesonide-formoterol (SYMBICORT) 160-4.5 MCG/ACT Inhaler   4. Seasonal allergic rhinitis, unspecified chronicity, unspecified trigger J30.2 mometasone (NASONEX) 50 MCG/ACT spray     cetirizine (ZYRTEC ALLERGY) 10 MG tablet         MEDICATIONS:        - Trial of I would like her to start Nasonex and Zyrtec consistently for the allergic rhinitis.  We will have her start Symbicort twice daily on a consistent basis for her asthma.  Singulair is also a category B medicine for pregnancy, but she is reluctant to take another oral medication.  Will hold on this for now and see how she does.  Following the pregnancy, may want to consider allergy consultation with formal testing.  For now, she really needs to look at her environment and look at triggers to see what is causing her  troubles.  If there are specific things that are really causing her to flares such as the cats, she may need to consider changing her environment as the importance of maintaining and keeping her asthma under good control was stressed to her today.  I will recheck her with her next visit to see how she is doing, but if there are any concerns that there is worsening or no improvement prior to that visit, she will alert us.       - Continue other medications without change    Darcy Duarte PA-C  [facility]    Orders Placed This Encounter     Asthma Action Plan (AAP)     DEPRESSION ACTION PLAN (DAP)     US OB > 14 Weeks Complete Single     Maternal quad screen     loratadine (CLARITIN) 10 MG capsule     mometasone (NASONEX) 50 MCG/ACT spray     cetirizine (ZYRTEC ALLERGY) 10 MG tablet     budesonide-formoterol (SYMBICORT) 160-4.5 MCG/ACT Inhaler       AVS given to patient upon discharge today.  Electronically signed by Darcy Duarte PA-C  January 16, 2018  11:25 AM

## 2018-01-16 NOTE — PROGRESS NOTES
Kena is a transfer OB from [facility].  OB intake was able to extrapolate labs and ultrasounds along with visits from care everywhere.  I reviewed all of these today.  Hemoglobin normal at 14.4.  HIV, hepatitis B, hepatitis C, syphilis negative. Blood type O positive    US done 12/22 with perinatology visit at [facility]:    IMPRESSIONS  Intrauterine pregnancy at 11w 6d   The nuchal translucency measurement is within the normal range.  U/S agrees with the EZRA OF 7/7/2018   Fetal anatomy appeared normal for early gestational age.   No adnexal abnormalities identified       Doing well the pregnancy.  She is having significant flare of her asthma especially since becoming pregnant.  Asthma flares tend to occur at nighttime.  She is allergic to cats and does have cats at her home, but they do not go into her bedroom.  She does have underlying allergies and is congested all of the time which she blames on a deviated septum.  Did buy some Claritin and started that and does have Nasonex at home but not using.  Previously had been on Singulair, but she was told to stop all medications per the OB she had started to see in [facility].  He states they were not aware of her issues with her asthma.    Taking PNV.  Discussed quad screening. Prefer to do this test prior to 20 week ultrasound.  Patient wishes to proceed. If quad study returns abnormal will do Level II ultrasound at [facility].   20 week US for full fetal anatomical survey ordered today.  RTC in 4 weeks    Orders Placed This Encounter     Asthma Action Plan (AAP)     DEPRESSION ACTION PLAN (DAP)     US OB > 14 Weeks Complete Single     Maternal quad screen     loratadine (CLARITIN) 10 MG capsule     mometasone (NASONEX) 50 MCG/ACT spray     cetirizine (ZYRTEC ALLERGY) 10 MG tablet     budesonide-formoterol (SYMBICORT) 160-4.5 MCG/ACT Inhaler       AVS given to patient upon discharge today.  Electronically signed by Darcy Duarte PA-C  January 16, 2018  11:22  AM

## 2018-01-17 ASSESSMENT — ASTHMA QUESTIONNAIRES: ACT_TOTALSCORE: 8

## 2018-01-18 ENCOUNTER — TELEPHONE (OUTPATIENT)
Dept: FAMILY MEDICINE | Facility: CLINIC | Age: 23
End: 2018-01-18

## 2018-01-18 LAB
# FETUSES US: NORMAL
AFP ADJ MOM AMN: 0.7
AFP SERPL-MCNC: 16 NG/ML
AGE - REPORTED: 21.3 YR
DATING METHOD: NORMAL
DIABETIC AT CONCEPTION: NO
FAMILY MEMBER DISEASES HX: NO
FAMILY MEMBER DISEASES HX: NO
GA METHOD: NORMAL
GA: 15.14 WEEKS
HCG MOM SERPL: 0.65
HCG SERPL-ACNC: NORMAL IU/L
HX OF HEREDITARY DISORDERS: NO
IDDM PATIENT QL: NO
INHIBIN A MOM SERPL: 0.37
INHIBIN A SERPL-MCNC: 60 PG/ML
INTEGRATED SCN PATIENT-IMP: NORMAL
LMP START DATE: NORMAL
PATHOLOGY STUDY: NORMAL
PREV HX CHROMOSOME ABNORMALITY: NO
SPECIMEN DRAWN SERPL: NORMAL
TWINS: NORMAL
U ESTRIOL MOM SERPL: 1.95
U ESTRIOL SERPL-MCNC: 1.17 NG/ML

## 2018-01-18 NOTE — TELEPHONE ENCOUNTER
Called pt and left a msg to call back.  Chantal Bland MA         Notes Recorded by Darcy Duarte PA-C on 1/18/2018 at 2:49 PM  Let her know quad screen normal

## 2018-02-05 ENCOUNTER — TELEPHONE (OUTPATIENT)
Dept: FAMILY MEDICINE | Facility: CLINIC | Age: 23
End: 2018-02-05

## 2018-02-05 NOTE — TELEPHONE ENCOUNTER
": 1995  PHONE #'s: [phone] (home)     PRESENTING PROBLEM:  Is 17 weeks along. This is her first baby. \" My friends are telling me that I should be feeling it move by this time and I don't.  I am concerned.\"    NURSING ASSESSMENT  Description:  Crying on the phone.  Contractions-onset/frequency/duration:  NONE  Low back pain:  yes - Has always had that.     Pelvic pressure:  no  Cramping:  Sometimes, but nothing new for me , just lasts a few seconds.  Bleeding/color/amount:  None  UTI Sx:  no    Precip. factors:  This is her first pregnancy so New to everything.   Hx of problems with pregnancy:  NONE  Improves/worsens Sx:  same  Baby activity (FKC >27wk 10/2hr):   NO  Sx specific meds:  PNV, Zyrtec and Nasonex Nasal Spray.   Last exam/Tx: 18/   Upcoming appt  and OB US scheduled for 18    RECOMMENDED DISPOSITION: Home care advice - babies usually aren't real active at 17 weeks usually by 24 weeks you may feel them move around so no need to get all worked up about it at this stage of development. I am sure friends were trying to be helpful , but don't understand  The norms for this type of things.  Keep appt for the upcoming US and OB appt.   Will comply with recommendation: YES   If further questions/concerns or if Sx do not improve, worsen or new Sx develop, call your PCP or [facility] as soon as possible.    NOTES:  Disposition was determined by the first positive assessment question, therefore all previous assessment questions were negative.  Informed to check provider manual or call insurance company to assure coverage.    Guideline used:  Telephone Triage for Obstetrics and Gynecology, Angeline Levy and Fozia Reece RN    "

## 2018-02-19 ENCOUNTER — HOSPITAL ENCOUNTER (OUTPATIENT)
Dept: ULTRASOUND IMAGING | Facility: CLINIC | Age: 23
Discharge: HOME | End: 2018-02-19
Attending: PHYSICIAN ASSISTANT | Admitting: PHYSICIAN ASSISTANT
Payer: COMMERCIAL

## 2018-02-19 ENCOUNTER — TELEPHONE (OUTPATIENT)
Dept: FAMILY MEDICINE | Facility: CLINIC | Age: 23
End: 2018-02-19

## 2018-02-19 ENCOUNTER — PRENATAL OFFICE VISIT (OUTPATIENT)
Dept: FAMILY MEDICINE | Facility: CLINIC | Age: 23
End: 2018-02-19
Payer: COMMERCIAL

## 2018-02-19 VITALS
TEMPERATURE: 98.7 F | DIASTOLIC BLOOD PRESSURE: 70 MMHG | WEIGHT: 211 LBS | HEART RATE: 82 BPM | RESPIRATION RATE: 18 BRPM | OXYGEN SATURATION: 100 % | SYSTOLIC BLOOD PRESSURE: 116 MMHG | BODY MASS INDEX: 36.22 KG/M2

## 2018-02-19 DIAGNOSIS — O28.3 ABNORMAL FETAL ULTRASOUND: Primary | ICD-10-CM

## 2018-02-19 DIAGNOSIS — Z34.00 ENCOUNTER FOR SUPERVISION OF NORMAL FIRST PREGNANCY, UNSPECIFIED TRIMESTER: Primary | ICD-10-CM

## 2018-02-19 DIAGNOSIS — Z34.00 ENCOUNTER FOR SUPERVISION OF NORMAL FIRST PREGNANCY, UNSPECIFIED TRIMESTER: ICD-10-CM

## 2018-02-19 PROCEDURE — 99207 ZZC PRENATAL VISIT: CPT | Performed by: PHYSICIAN ASSISTANT

## 2018-02-19 PROCEDURE — 76805 OB US >/= 14 WKS SNGL FETUS: CPT

## 2018-02-19 ASSESSMENT — PAIN SCALES - GENERAL: PAINLEVEL: NO PAIN (0)

## 2018-02-19 NOTE — PROGRESS NOTES
Please let her know that her 20 week ultrasound looks great except as she mentioned to me, certain key anatomical structures could not be seen well.  Fetal spine and heart were not seen well.  I would like to repeat this ultrasound in the next 1-2 weeks as a limited ultrasound to make sure that all is well.  Please help coordinate, she prefers Friday mornings.

## 2018-02-19 NOTE — NURSING NOTE
"Chief Complaint   Patient presents with     Prenatal Care       Initial /70  Pulse 82  Temp 98.7  F (37.1  C) (Tympanic)  Resp 18  Wt 211 lb (95.7 kg)  LMP 10/02/2017  SpO2 100%  BMI 36.22 kg/m2 Estimated body mass index is 36.22 kg/(m^2) as calculated from the following:    Height as of 1/8/18: 5' 4\" (1.626 m).    Weight as of this encounter: 211 lb (95.7 kg).  BP completed using cuff size: levy Bland MA      "

## 2018-02-19 NOTE — TELEPHONE ENCOUNTER
----- Message from Darcy Duarte PA-C sent at 2/19/2018  4:42 PM CST -----  Please let her know that her 20 week ultrasound looks great except as she mentioned to me, certain key anatomical structures could not be seen well.  Fetal spine and heart were not seen well.  I would like to repeat this ultrasound in the next 1-2 weeks as a limited ultrasound to make sure that all is well.  Please help coordinate, she prefers Friday mornings.

## 2018-02-19 NOTE — PROGRESS NOTES
Doing well.  No concerns today. Taking PNV.  20 week US for full fetal anatomical survey was done today - will contact her with results as soon as radiology has reviewed.   RTC in 4 weeks    Electronically signed by Darcy Duarte PA-C  2/19/2018

## 2018-02-19 NOTE — MR AVS SNAPSHOT
After Visit Summary   2/19/2018    Kena Sorto    MRN: [id number]           Patient Information     Date Of Birth          1995        Visit Information        Provider Department      2/19/2018 10:00 AM Darcy Duarte PA-C [facility]        Today's Diagnoses     Encounter for supervision of normal first pregnancy, unspecified trimester    -  1       Follow-ups after your visit        Your next 10 appointments already scheduled     Mar 19, 2018 10:30 AM CDT   ESTABLISHED PRENATAL with Darcy Duarte PA-C   [facility] ([facility])    [address] [place] [address]   [phone]            Apr 16, 2018 10:30 AM CDT   ESTABLISHED PRENATAL with Darcy Duarte PA-C   [facility] ([facility])    [address] [place] [address]   [phone]            Apr 30, 2018 10:30 AM CDT   ESTABLISHED PRENATAL with Darcy Duarte PA-C   [facility] ([facility])    [address] [place] [address]   [phone]            May 14, 2018 10:30 AM CDT   ESTABLISHED PRENATAL with Darcy Duarte PA-C   [facility] ([facility])    [address] [place] [address]   [phone]            May 29, 2018 10:30 AM CDT   ESTABLISHED PRENATAL with Darcy Duarte PA-C   [facility] ([facility])    [address] [place] [address]   [phone]            Jun 13, 2018 10:30 AM CDT   ESTABLISHED PRENATAL with Darcy Duarte PA-C   [facility] ([facility])    [address] [place] [address]   [phone]            Jun 18, 2018 10:30 AM CDT   ESTABLISHED PRENATAL with Darcy Duarte PA-C   [facility] ([facility])    [address] [place] [address]   [phone]             "Jun 25, 2018 10:30 AM CDT   ESTABLISHED PRENATAL with Darcy Duarte PA-C   [facility] ([facility]    [address] [place] [address] [phone]            Jul 02, 2018 10:30 AM CDT   ESTABLISHED PRENATAL with Darcy Duarte PA-C   [facility] ([facility])    [address] [place] [id number] [phone]            Jul 09, 2018 10:30 AM CDT   ESTABLISHED PRENATAL with Darcy S PEPPER Duarte   [facility] ([facility])    [address] [place] [id number] [phone]              Who to contact     If you have questions or need follow up information about today's clinic visit or your schedule please contact [facility] directly at [phone].  Normal or non-critical lab and imaging results will be communicated to you by MyChart, letter or phone within 4 business days after the clinic has received the results. If you do not hear from us within 7 days, please contact the clinic through [employer] or phone. If you have a critical or abnormal lab result, we will notify you by phone as soon as possible.  Submit refill requests through [employer] or call your pharmacy and they will forward the refill request to us. Please allow 3 business days for your refill to be completed.          Additional Information About Your Visit        [employer] Information     [employer] lets you send messages to your doctor, view your test results, renew your prescriptions, schedule appointments and more. To sign up, go to [email or web address] . Click on \"Log in\" on the left side of the screen, which will take you to the Welcome page. Then click on \"Sign up Now\" on the right side of the page.     You will be asked to enter the access code listed below, as well as some personal information. Please follow the directions to create your username and password.     Your access code is: " [email or web address]  Expires: 2018  1:46 PM     Your access code will  in 90 days. If you need help or a new code, please call your [place] clinic or [phone].        Care EveryWhere ID     This is your Care EveryWhere ID. This could be used by other organizations to access your [place] medical records  [id number]        Your Vitals Were     Pulse Temperature Respirations Last Period Pulse Oximetry BMI (Body Mass Index)    82 98.7  F (37.1  C) (Tympanic) 18 10/02/2017 100% 36.22 kg/m2       Blood Pressure from Last 3 Encounters:   18 116/70   18 122/72   17 118/70    Weight from Last 3 Encounters:   18 211 lb (95.7 kg)   18 207 lb (93.9 kg)   18 206 lb (93.4 kg)              Today, you had the following     No orders found for display       Primary Care Provider Office Phone # Fax #    Darcy Duarte PA-C [phone] [address] [place] [address]        Equal Access to Services     [facility]: Hadii aad ku hadasho Soomaali, waaxda luqadaha, qaybta kaalmada adeegyada, nino gomez. So [facility] [phone].    ATENCIÓN: Si habla español, tiene a bernal disposición servicios gratuitos de asistencia lingüística. Llame al [phone].    We comply with applicable federal civil rights laws and [place] laws. We do not discriminate on the basis of race, color, national origin, age, disability, sex, sexual orientation, or gender identity.            Thank you!     Thank you for choosing [facility]  for your care. Our goal is always to provide you with excellent care. Hearing back from our patients is one way we can continue to improve our services. Please take a few minutes to complete the written survey that you may receive in the mail after your visit with us. Thank you!             Your Updated Medication List - Protect others around you: Learn how to safely use, store and throw away your medicines at  www.disposemymeds.org.          This list is accurate as of 2/19/18 10:24 AM.  Always use your most recent med list.                   Brand Name Dispense Instructions for use Diagnosis    albuterol 108 (90 BASE) MCG/ACT Inhaler    PROAIR HFA/PROVENTIL HFA/VENTOLIN HFA    2 Inhaler    Inhale 2 puffs into the lungs every 6 hours as needed for shortness of breath / dyspnea or wheezing    Mild persistent asthma without complication       budesonide-formoterol 160-4.5 MCG/ACT Inhaler    SYMBICORT    3 Inhaler    Inhale 2 puffs into the lungs 2 times daily    Mild persistent asthma without complication       cetirizine 10 MG tablet    ZYRTEC ALLERGY    90 tablet    Take 1 tablet (10 mg) by mouth daily    Seasonal allergic rhinitis, unspecified chronicity, unspecified trigger       CLARITIN 10 MG capsule   Generic drug:  loratadine      Take 10 mg by mouth daily        mometasone 50 MCG/ACT spray    NASONEX    3 Box    Spray 2 sprays into both nostrils daily    Seasonal allergic rhinitis, unspecified chronicity, unspecified trigger       ondansetron 4 MG ODT tab    ZOFRAN ODT    20 tablet    Take 1-2 tablets (4-8 mg) by mouth 3 times daily (before meals)    Nausea       polyethylene glycol powder    MIRALAX    510 g    Take 17 g (1 capful) by mouth daily    Constipation, unspecified constipation type       prenatal multivitamin plus iron 27-0.8 MG Tabs per tablet      Take 1 tablet by mouth daily

## 2018-02-20 ASSESSMENT — ASTHMA QUESTIONNAIRES: ACT_TOTALSCORE: 23

## 2018-02-20 NOTE — TELEPHONE ENCOUNTER
Message left to return call to clinic to in form of results and of need for a follow up appointment. Please inform of message below and of need to schedule a follow up recheck OB US in the next 1-2 weeks. Inform of US being scheduled on Friday 3/9/18, check in at 10:55 am for a 11:10 scan. Inform of need to have a fairly comfortable bladder as well. Bina Larios LPN

## 2018-02-20 NOTE — TELEPHONE ENCOUNTER
Patient informed of US results and of need for follow US in 1-2 weeks. Informed we have her scheduled Friday, March 9th, check in at 10:55 for a 11:10 US. Informed to have a comfortably full bladder. Bina Larios LPN

## 2018-03-09 ENCOUNTER — HOSPITAL ENCOUNTER (OUTPATIENT)
Dept: ULTRASOUND IMAGING | Facility: CLINIC | Age: 23
Discharge: HOME | End: 2018-03-09
Attending: PHYSICIAN ASSISTANT | Admitting: PHYSICIAN ASSISTANT
Payer: COMMERCIAL

## 2018-03-09 DIAGNOSIS — O28.3 ABNORMAL FETAL ULTRASOUND: ICD-10-CM

## 2018-03-09 PROCEDURE — 76816 OB US FOLLOW-UP PER FETUS: CPT

## 2018-03-09 NOTE — PROGRESS NOTES
Let Kena know the US looks great - all anatomy previously not seen is well seen today.  No further US unless concerns in future OB visits.

## 2018-03-19 ENCOUNTER — PRENATAL OFFICE VISIT (OUTPATIENT)
Dept: FAMILY MEDICINE | Facility: CLINIC | Age: 23
End: 2018-03-19
Payer: COMMERCIAL

## 2018-03-19 VITALS
TEMPERATURE: 98.6 F | HEART RATE: 82 BPM | WEIGHT: 220 LBS | DIASTOLIC BLOOD PRESSURE: 72 MMHG | OXYGEN SATURATION: 99 % | RESPIRATION RATE: 18 BRPM | SYSTOLIC BLOOD PRESSURE: 120 MMHG | BODY MASS INDEX: 37.76 KG/M2

## 2018-03-19 DIAGNOSIS — Z34.00 ENCOUNTER FOR SUPERVISION OF NORMAL FIRST PREGNANCY, UNSPECIFIED TRIMESTER: Primary | ICD-10-CM

## 2018-03-19 PROCEDURE — 99207 ZZC PRENATAL VISIT: CPT | Performed by: PHYSICIAN ASSISTANT

## 2018-03-19 ASSESSMENT — PAIN SCALES - GENERAL: PAINLEVEL: NO PAIN (0)

## 2018-03-19 NOTE — Clinical Note
Please abstract the following data from this visit with this patient into the appropriate field in Epic:  Pap smear done on this date: 2017 (approximately), by this group: [facility] , results were Normal .

## 2018-03-19 NOTE — PROGRESS NOTES
Doing well.  No concerns today. Taking PNV.   She has gained 9 pounds since last visit 1 month ago.  Reviewed healthy eating and portion control along with activity suggestion.  She is eating large meals.  Not doing a lot of snacking.  I suggested considering small frequent meals and avoiding overeating at lunch and dinner.  Reviewed recent ultrasound-had a repeat ultrasound as some of the anatomy was not well visualized.  This ultrasound looked totally normal.  Reviewed recent US-no concerns with anatomical survey. Placental placement, fluid levels, and fetal anatomy all returned normal.  Discussed opportunity to meet with OB coordinator surrounding education inclusive of third trimester of pregnancy.  Also encouraged birthing & lactation classes  offered at [facility].  Reminded of upcoming labs including 1 hour GTT, antitreponema and hemoglobin. These labs are typically done between 24-28 weeks gestation.  Also discussed upcoming Tdap recommendation - would like to do this immunization between 27-34 weeks for baby's immune system to have protection against pertussis.  RTC in 4 weeks    Electronically signed by Darcy Duarte PA-C  3/19/2018

## 2018-03-19 NOTE — NURSING NOTE
"Chief Complaint   Patient presents with     Prenatal Care       Initial /72  Pulse 82  Temp 98.6  F (37  C) (Tympanic)  Resp 18  Wt 220 lb (99.8 kg)  LMP 10/02/2017  SpO2 99%  BMI 37.76 kg/m2 Estimated body mass index is 37.76 kg/(m^2) as calculated from the following:    Height as of 1/8/18: 5' 4\" (1.626 m).    Weight as of this encounter: 220 lb (99.8 kg).  BP completed using cuff size: levy Bland MA      "

## 2018-03-19 NOTE — MR AVS SNAPSHOT
After Visit Summary   3/19/2018    Kena Sorto    MRN: [id number]           Patient Information     Date Of Birth          1995        Visit Information        Provider Department      3/19/2018 10:30 AM Darcy Duarte PA-C [facility]        Today's Diagnoses     Encounter for supervision of normal first pregnancy, unspecified trimester    -  1       Follow-ups after your visit        Your next 10 appointments already scheduled     Apr 16, 2018 10:30 AM CDT   ESTABLISHED PRENATAL with Darcy Duarte PA-C   [facility] ([facility])    [address] [place] [address]   [phone]            Apr 30, 2018 10:30 AM CDT   ESTABLISHED PRENATAL with Darcy Duarte PA-C   [facility] ([facility])    [address] [place] [address]   [phone]            May 14, 2018 10:30 AM CDT   ESTABLISHED PRENATAL with Darcy Duarte PA-C   [facility] ([facility])    [address] [place] [address]   [phone]            May 29, 2018 10:30 AM CDT   ESTABLISHED PRENATAL with Darcy Duarte PA-C   [facility] ([facility])    [address] [place] [address]   [phone]            Jun 13, 2018 10:30 AM CDT   ESTABLISHED PRENATAL with Darcy Duarte PA-C   [facility] ([facility])    [address] [place] [address]   [phone]            Jun 18, 2018 10:30 AM CDT   ESTABLISHED PRENATAL with Darcy Duarte PA-C   [facility] ([facility])    [address] [place] [address]   [phone]            Jun 25, 2018 10:30 AM CDT   ESTABLISHED PRENATAL with Darcy Duarte PA-C   [facility] ([facility])    [address] [place] [address]   [phone]             "2018 10:30 AM CDT   ESTABLISHED PRENATAL with Darcy Duarte PA-C   [facility] ([facility])    [address] [place] [address] [phone]            2018 10:30 AM CDT   ESTABLISHED PRENATAL with Darcy Duarte PA-C   [facility] ([facility])    [address] [place] [address] [phone]              Who to contact     If you have questions or need follow up information about today's clinic visit or your schedule please contact [facility] directly at [phone].  Normal or non-critical lab and imaging results will be communicated to you by MyChart, letter or phone within 4 business days after the clinic has received the results. If you do not hear from us within 7 days, please contact the clinic through [employer] or phone. If you have a critical or abnormal lab result, we will notify you by phone as soon as possible.  Submit refill requests through [employer] or call your pharmacy and they will forward the refill request to us. Please allow 3 business days for your refill to be completed.          Additional Information About Your Visit        MyChart Information     [employer] lets you send messages to your doctor, view your test results, renew your prescriptions, schedule appointments and more. To sign up, go to [email or web address] . Click on \"Log in\" on the left side of the screen, which will take you to the Welcome page. Then click on \"Sign up Now\" on the right side of the page.     You will be asked to enter the access code listed below, as well as some personal information. Please follow the directions to create your username and password.     Your access code is: [email or web address]  Expires: 2018  2:46 PM     Your access code will  in 90 days. If you need help or a new code, please call your [place] clinic or [phone].        Care EveryWhere ID     This is your Care " EveryWhere ID. This could be used by other organizations to access your [place] medical records  [id number]        Your Vitals Were     Pulse Temperature Respirations Last Period Pulse Oximetry BMI (Body Mass Index)    82 98.6  F (37  C) (Tympanic) 18 10/02/2017 99% 37.76 kg/m2       Blood Pressure from Last 3 Encounters:   03/19/18 120/72   02/19/18 116/70   01/16/18 122/72    Weight from Last 3 Encounters:   03/19/18 220 lb (99.8 kg)   02/19/18 211 lb (95.7 kg)   01/16/18 207 lb (93.9 kg)              Today, you had the following     No orders found for display       Primary Care Provider Office Phone # Fax #    Darcy Duarte PA-C [phone] [address] [place] [address]        Equal Access to Services     [facility]: Hadii ann-marie alexander hadasho Soomaali, waaxda luqadaha, qaybta kaalmada adeegyada, nino doll . So [facility] [phone].    ATENCIÓN: Si habla español, tiene a bernal disposición servicios gratuitos de asistencia lingüística. Llame al [phone].    We comply with applicable federal civil rights laws and [place] laws. We do not discriminate on the basis of race, color, national origin, age, disability, sex, sexual orientation, or gender identity.            Thank you!     Thank you for choosing [facility]  for your care. Our goal is always to provide you with excellent care. Hearing back from our patients is one way we can continue to improve our services. Please take a few minutes to complete the written survey that you may receive in the mail after your visit with us. Thank you!             Your Updated Medication List - Protect others around you: Learn how to safely use, store and throw away your medicines at www.disposemymeds.org.          This list is accurate as of 3/19/18 10:43 AM.  Always use your most recent med list.                   Brand Name Dispense Instructions for use Diagnosis    albuterol 108 (90 BASE)  MCG/ACT Inhaler    PROAIR HFA/PROVENTIL HFA/VENTOLIN HFA    2 Inhaler    Inhale 2 puffs into the lungs every 6 hours as needed for shortness of breath / dyspnea or wheezing    Mild persistent asthma without complication       budesonide-formoterol 160-4.5 MCG/ACT Inhaler    SYMBICORT    3 Inhaler    Inhale 2 puffs into the lungs 2 times daily    Mild persistent asthma without complication       cetirizine 10 MG tablet    ZYRTEC ALLERGY    90 tablet    Take 1 tablet (10 mg) by mouth daily    Seasonal allergic rhinitis, unspecified chronicity, unspecified trigger       CLARITIN 10 MG capsule   Generic drug:  loratadine      Take 10 mg by mouth daily        mometasone 50 MCG/ACT spray    NASONEX    3 Box    Spray 2 sprays into both nostrils daily    Seasonal allergic rhinitis, unspecified chronicity, unspecified trigger       ondansetron 4 MG ODT tab    ZOFRAN ODT    20 tablet    Take 1-2 tablets (4-8 mg) by mouth 3 times daily (before meals)    Nausea       polyethylene glycol powder    MIRALAX    510 g    Take 17 g (1 capful) by mouth daily    Constipation, unspecified constipation type       prenatal multivitamin plus iron 27-0.8 MG Tabs per tablet      Take 1 tablet by mouth daily

## 2018-04-08 NOTE — PROGRESS NOTES
Doing well.  No concerns today. Taking PNV.   tdap done today.   OB labs including 1hr GTT, Anti Treponema and Hemoglobin will be done today.  Prenatal flowsheet information is reviewed.  Discussed kick counts and fetal movement.  Discussed opportunity to meet with OB coordinator surrounding education inclusive of third trimester of pregnancy.  Also encouraged birthing & lactation classes  offered at [facility].  I will contact her with lab results through [employer] if active or by phone call.      RTC in 2 weeks    Electronically signed by Darcy Duarte PA-C  4/8/2018

## 2018-04-16 ENCOUNTER — PRENATAL OFFICE VISIT (OUTPATIENT)
Dept: FAMILY MEDICINE | Facility: CLINIC | Age: 23
End: 2018-04-16
Payer: COMMERCIAL

## 2018-04-16 VITALS
WEIGHT: 225 LBS | OXYGEN SATURATION: 99 % | SYSTOLIC BLOOD PRESSURE: 120 MMHG | DIASTOLIC BLOOD PRESSURE: 80 MMHG | BODY MASS INDEX: 38.62 KG/M2 | HEART RATE: 82 BPM | TEMPERATURE: 96.8 F | RESPIRATION RATE: 18 BRPM

## 2018-04-16 DIAGNOSIS — Z34.00 ENCOUNTER FOR SUPERVISION OF NORMAL FIRST PREGNANCY, UNSPECIFIED TRIMESTER: ICD-10-CM

## 2018-04-16 DIAGNOSIS — Z23 NEED FOR VACCINATION: Primary | ICD-10-CM

## 2018-04-16 LAB
GLUCOSE 1H P 50 G GLC PO SERPL-MCNC: 103 MG/DL (ref 60–129)
HGB BLD-MCNC: 13.3 G/DL (ref 11.7–15.7)

## 2018-04-16 PROCEDURE — 86780 TREPONEMA PALLIDUM: CPT | Performed by: PHYSICIAN ASSISTANT

## 2018-04-16 PROCEDURE — 00000218 ZZHCL STATISTIC OBHBG - HEMOGLOBIN: Performed by: PHYSICIAN ASSISTANT

## 2018-04-16 PROCEDURE — 90471 IMMUNIZATION ADMIN: CPT | Performed by: PHYSICIAN ASSISTANT

## 2018-04-16 PROCEDURE — 82950 GLUCOSE TEST: CPT | Performed by: PHYSICIAN ASSISTANT

## 2018-04-16 PROCEDURE — 99207 ZZC PRENATAL VISIT: CPT | Mod: 25 | Performed by: PHYSICIAN ASSISTANT

## 2018-04-16 PROCEDURE — 36415 COLL VENOUS BLD VENIPUNCTURE: CPT | Performed by: PHYSICIAN ASSISTANT

## 2018-04-16 PROCEDURE — 90715 TDAP VACCINE 7 YRS/> IM: CPT | Performed by: PHYSICIAN ASSISTANT

## 2018-04-16 ASSESSMENT — PAIN SCALES - GENERAL: PAINLEVEL: NO PAIN (0)

## 2018-04-16 NOTE — NURSING NOTE
Prior to injection verified patient identity using patient's name and date of birth.  Per orders of  Darcy Duarte injection of Tdap  given by Chantal Bland MA. Patient instructed to remain in clinic for 20 minutes afterwards and to report any adverse reaction to me immediately.         Screening Questionnaire for Adult Immunization    Are you sick today?   No   Do you have allergies to medications, food, a vaccine component or latex?   No   Have you ever had a serious reaction after receiving a vaccination?   No   Do you have a long-term health problem with heart disease, lung disease, asthma, kidney disease, metabolic disease (e.g. diabetes), anemia, or other blood disorder?   No   Do you have cancer, leukemia, HIV/AIDS, or any other immune system problem?   No   In the past 3 months, have you taken medications that affect  your immune system, such as prednisone, other steroids, or anticancer drugs; drugs for the treatment of rheumatoid arthritis, Crohn s disease, or psoriasis; or have you had radiation treatments?   No   Have you had a seizure, or a brain or other nervous system problem?   No   During the past year, have you received a transfusion of blood or blood     products, or been given immune (gamma) globulin or antiviral drug?   No   For women: Are you pregnant or is there a chance you could become        pregnant during the next month?   No   Have you received any vaccinations in the past 4 weeks?   No     Immunization questionnaire answers were all negative.         Screening performed by Keesha Bland on 4/16/2018 at 11:01 AM.

## 2018-04-16 NOTE — NURSING NOTE
"Chief Complaint   Patient presents with     Prenatal Care       Initial /80  Pulse 82  Temp 96.8  F (36  C) (Temporal)  Resp 18  Wt 225 lb (102.1 kg)  LMP 10/02/2017  SpO2 99%  BMI 38.62 kg/m2 Estimated body mass index is 38.62 kg/(m^2) as calculated from the following:    Height as of 1/8/18: 5' 4\" (1.626 m).    Weight as of this encounter: 225 lb (102.1 kg).  BP completed using cuff size: levy Bland MA      "

## 2018-04-16 NOTE — MR AVS SNAPSHOT
After Visit Summary   4/16/2018    Kena Sorto    MRN: [id number]           Patient Information     Date Of Birth          1995        Visit Information        Provider Department      4/16/2018 10:30 AM Darcy Duarte PA-C [facility]        Today's Diagnoses     Need for vaccination    -  1    Encounter for supervision of normal first pregnancy, unspecified trimester           Follow-ups after your visit        Your next 10 appointments already scheduled     Apr 30, 2018 10:30 AM CDT   ESTABL[facility] PR[facility]AT[facility] with Darcy [facility]-C   [facility]    [address] [place] [address]   [phone]            May 14, 2018 10:30 AM CDT   ESTABLISHED PRENATAL with Darcy Duarte PA-C   [facility]    [address] [place] [address]   [phone]            May 29, 2018 10:30 AM CDT   ESTABLISHED PRENATAL with Darcy Duarte PA-C   [facility]    [address] [place] [address]   [phone]            Jun 13, 2018 10:30 AM CDT   ESTABLISHED PRENATAL with Darcy Duarte PA-C   [facility]    [address] [place] [address]   [phone]            Jun 18, 2018 10:30 AM CDT   ESTABLISHED PRENATAL with Darcy Duarte PA-C   [facility]    [address] [place] [address]   [phone]            Jun 25, 2018 10:30 AM CDT   ESTABLISHED PRENATAL with Darcy Duarte PA-C   [facility]    [address] [place] [address]   [phone]            Jul 02, 2018 10:30 AM CDT   ESTABLISHED PRENATAL with Darcy Duarte PA-C   [facility]    [address] [place] [phone]  "  [phone]            2018 11:00 AM CDT   ESTABLISHED PRENATAL with Ruddy Hart MD   [facility]    [address] [place] [address] [phone]              Who to contact     If you have questions or need follow up information about today's clinic visit or your schedule please contact [facility] directly at [phone].  Normal or non-critical lab and imaging results will be communicated to you by MyChart, letter or phone within 4 business days after the clinic has received the results. If you do not hear from us within 7 days, please contact the clinic through [employer] or phone. If you have a critical or abnormal lab result, we will notify you by phone as soon as possible.  Submit refill requests through bCODE or call your pharmacy and they will forward the refill request to us. Please allow 3 business days for your refill to be completed.          Additional Information About Your Visit        bCODE Information     bCODE lets you send messages to your doctor, view your test results, renew your prescriptions, schedule appointments and more. To sign up, go to [email or web address] . Click on \"Log in\" on the left side of the screen, which will take you to the Welcome page. Then click on \"Sign up Now\" on the right side of the page.     You will be asked to enter the access code listed below, as well as some personal information. Please follow the directions to create your username and password.     Your access code is: [email or web address]  Expires: 7/15/2018 11:01 AM     Your access code will  in 90 days. If you need help or a new code, please call your [facility] or [phone].        Care EveryWhere ID     This is your Care EveryWhere ID. This could be used by other organizations to access your [facility] medical records  [id number]        Your Vitals Were     Pulse Temperature Respirations Last Period Pulse " Oximetry BMI (Body Mass Index)    82 96.8  F (36  C) (Temporal) 18 10/02/2017 99% 38.62 kg/m2       Blood Pressure from Last 3 Encounters:   04/16/18 120/80   03/19/18 120/72   02/19/18 116/70    Weight from Last 3 Encounters:   04/16/18 225 lb (102.1 kg)   03/19/18 220 lb (99.8 kg)   02/19/18 211 lb (95.7 kg)              We Performed the Following     1st  Administration  [[id number]]     Anti Treponema     Glucose tolerance, gest screen, 1 hour     OB hemoglobin     TDAP VACCINE (ADACEL) [56606.002]        Primary Care Provider Office Phone # Fax #    Darcy Duarte PA-C [phone] [address] [place] [address]        Equal Access to Services     JOSE MOORE : Hadii aad ku hadasho Soomaali, waaxda luqadaha, qaybta kaalmada adeegyada, nino calixtoin hayrajiv doll . So [facility] [phone].    ATENCIÓN: Si habla español, tiene a bernal disposición servicios gratuitos de asistencia lingüística. Diandra al [phone].    We comply with applicable federal civil rights laws and [place] laws. We do not discriminate on the basis of race, color, national origin, age, disability, sex, sexual orientation, or gender identity.            Thank you!     Thank you for choosing [facility]  for your care. Our goal is always to provide you with excellent care. Hearing back from our patients is one way we can continue to improve our services. Please take a few minutes to complete the written survey that you may receive in the mail after your visit with us. Thank you!             Your Updated Medication List - Protect others around you: Learn how to safely use, store and throw away your medicines at www.disposemymeds.org.          This list is accurate as of 4/16/18 11:01 AM.  Always use your most recent med list.                   Brand Name Dispense Instructions for use Diagnosis    albuterol 108 (90 Base) MCG/ACT Inhaler    PROAIR HFA/PROVENTIL HFA/VENTOLIN HFA    2 Inhaler     Inhale 2 puffs into the lungs every 6 hours as needed for shortness of breath / dyspnea or wheezing    Mild persistent asthma without complication       budesonide-formoterol 160-4.5 MCG/ACT Inhaler    SYMBICORT    3 Inhaler    Inhale 2 puffs into the lungs 2 times daily    Mild persistent asthma without complication       cetirizine 10 MG tablet    ZYRTEC ALLERGY    90 tablet    Take 1 tablet (10 mg) by mouth daily    Seasonal allergic rhinitis, unspecified chronicity, unspecified trigger       CLARITIN 10 MG capsule   Generic drug:  loratadine      Take 10 mg by mouth daily        mometasone 50 MCG/ACT spray    NASONEX    3 Box    Spray 2 sprays into both nostrils daily    Seasonal allergic rhinitis, unspecified chronicity, unspecified trigger       ondansetron 4 MG ODT tab    ZOFRAN ODT    20 tablet    Take 1-2 tablets (4-8 mg) by mouth 3 times daily (before meals)    Nausea       polyethylene glycol powder    MIRALAX    510 g    Take 17 g (1 capful) by mouth daily    Constipation, unspecified constipation type       prenatal multivitamin plus iron 27-0.8 MG Tabs per tablet      Take 1 tablet by mouth daily

## 2018-04-17 LAB — T PALLIDUM IGG+IGM SER QL: NEGATIVE

## 2018-04-17 ASSESSMENT — PATIENT HEALTH QUESTIONNAIRE - PHQ9: SUM OF ALL RESPONSES TO PHQ QUESTIONS 1-9: 7

## 2018-05-01 ENCOUNTER — PRENATAL OFFICE VISIT (OUTPATIENT)
Dept: FAMILY MEDICINE | Facility: CLINIC | Age: 23
End: 2018-05-01
Payer: COMMERCIAL

## 2018-05-01 VITALS
OXYGEN SATURATION: 96 % | TEMPERATURE: 98.1 F | BODY MASS INDEX: 38.62 KG/M2 | DIASTOLIC BLOOD PRESSURE: 82 MMHG | HEART RATE: 80 BPM | RESPIRATION RATE: 18 BRPM | WEIGHT: 225 LBS | SYSTOLIC BLOOD PRESSURE: 120 MMHG

## 2018-05-01 DIAGNOSIS — Z34.00 ENCOUNTER FOR SUPERVISION OF NORMAL FIRST PREGNANCY, UNSPECIFIED TRIMESTER: ICD-10-CM

## 2018-05-01 DIAGNOSIS — R21 RASH AND NONSPECIFIC SKIN ERUPTION: Primary | ICD-10-CM

## 2018-05-01 PROCEDURE — 99207 ZZC PRENATAL VISIT: CPT | Performed by: PHYSICIAN ASSISTANT

## 2018-05-01 PROCEDURE — 99213 OFFICE O/P EST LOW 20 MIN: CPT | Mod: 25 | Performed by: PHYSICIAN ASSISTANT

## 2018-05-01 RX ORDER — HYDROXYZINE HYDROCHLORIDE 25 MG/1
25-50 TABLET, FILM COATED ORAL EVERY 4 HOURS PRN
Qty: 90 TABLET | Refills: 1 | Status: SHIPPED | OUTPATIENT
Start: 2018-05-01 | End: 2018-06-10

## 2018-05-01 ASSESSMENT — PAIN SCALES - GENERAL: PAINLEVEL: NO PAIN (0)

## 2018-05-01 NOTE — NURSING NOTE
"Chief Complaint   Patient presents with     Prenatal Care       Initial /82  Pulse 80  Temp 98.1  F (36.7  C) (Temporal)  Resp 18  Wt 225 lb (102.1 kg)  LMP 10/02/2017  SpO2 96%  BMI 38.62 kg/m2 Estimated body mass index is 38.62 kg/(m^2) as calculated from the following:    Height as of 1/8/18: 5' 4\" (1.626 m).    Weight as of this encounter: 225 lb (102.1 kg).  BP completed using cuff size: levy Bland MA      "

## 2018-05-01 NOTE — MR AVS SNAPSHOT
After Visit Summary   5/1/2018    Kena Sorto    MRN: [id number]           Patient Information     Date Of Birth          1995        Visit Information        Provider Department      5/1/2018 12:45 PM Darcy Duarte PA-C [facility]        Today's Diagnoses     Rash and nonspecific skin eruption    -  1    Encounter for supervision of normal first pregnancy, unspecified trimester           Follow-ups after your visit        Your next 10 appointments already scheduled     May 14, 2018 10:30 AM CDT   ESTABLISHED PRENATAL with Darcy Duarte PA-C   [facility]    [address] [place] [address]   [phone]            May 29, 2018 10:30 AM CDT   ESTABLISHED PRENATAL with Darcy Duarte PA-C   [facility]    [address] [place] [address]   [phone]            Jun 13, 2018 10:30 AM CDT   ESTABLISHED PRENATAL with Darcy Duarte PA-C   [facility]    [address] [place] [address]   [phone]            Jun 18, 2018 10:30 AM CDT   ESTABLISHED PRENATAL with Darcy Duarte PA-C   [facility]    [address] [place] [address]   [phone]            Jun 25, 2018 10:30 AM CDT   ESTABLISHED PRENATAL with Darcy Duarte PA-C   [facility]    [address] [place] [address]   [phone]            Jul 02, 2018 10:30 AM CDT   ESTABLISHED PRENATAL with Darcy Duarte PA-C   [facility]    [address] [place] [address]   [phone]            Jul 09, 2018 11:00 AM CDT   ESTABLISHED PRENATAL with Ruddy Hart MD   [facility]    [address] [place] [address]  [phone]              Who to contact     If you have questions or need follow up information about today's clinic visit or your schedule please contact [facility] directly at [phone].  Normal or non-critical lab and imaging results will be communicated to you by Mailsuitehart, letter or phone within 4 business days after the clinic has received the results. If you do not hear from us within 7 days, please contact the clinic through [employer] or phone. If you have a critical or abnormal lab result, we will notify you by phone as soon as possible.  Submit refill requests through [employer] or call your pharmacy and they will forward the refill request to us. Please allow 3 business days for your refill to be completed.          Additional Information About Your Visit        Mailsuite[employer]wars Information     [employer] gives you secure access to your electronic health record. If you see a primary care provider, you can also send messages to your care team and make appointments. If you have questions, please call your primary care clinic.  If you do not have a primary care provider, please call [phone] and they will assist you.        Care EveryWhere ID     This is your Care EveryWhere ID. This could be used by other organizations to access your [facility] medical records  [id number]        Your Vitals Were     Pulse Temperature Respirations Last Period Pulse Oximetry BMI (Body Mass Index)    80 98.1  F (36.7  C) (Temporal) 18 10/02/2017 96% 38.62 kg/m2       Blood Pressure from Last 3 Encounters:   05/01/18 120/82   04/16/18 120/80   03/19/18 120/72    Weight from Last 3 Encounters:   05/01/18 225 lb (102.1 kg)   04/16/18 225 lb (102.1 kg)   03/19/18 220 lb (99.8 kg)              Today, you had the following     No orders found for display         Today's Medication Changes          These changes are accurate as of 5/1/18  1:58 PM.  If you have any questions, ask your nurse or doctor.                Start taking these medicines.        Dose/Directions    hydrOXYzine 25 MG tablet   Commonly known as:  ATARAX   Used for:  Rash and nonspecific skin eruption   Started by:  Darcy Duarte PA-C        Dose:  25-50 mg   Take 1-2 tablets (25-50 mg) by mouth every 4 hours as needed for itching   Quantity:  90 tablet   Refills:  1         Stop taking these medicines if you haven't already. Please contact your care team if you have questions.     CLARITIN 10 MG capsule   Generic drug:  loratadine   Stopped by:  Darcy Duarte PA-C                Where to get your medicines      These medications were sent to [id number] - [place], [place] - [address]  [address], [facility] [place] [address]     Phone:  [phone]     hydrOXYzine 25 MG tablet                Primary Care Provider Office Phone # Fax #    Darcy Duarte PA-C [phone] [address] [place] [address]        Equal Access to Services     JOAQUINA MOORE AH: Hadii ann-marie ku hadasho Soomaali, waaxda luqadaha, qaybta kaalmada adeegyada, waxay idiin haybeatrizn benedicto doll . So [facility] [phone].    ATENCIÓN: Si veronica andrews, tiene a bernal disposición servicios gratuitos de asistencia lingüística. Llame al [phone].    We comply with applicable federal civil rights laws and [place] laws. We do not discriminate on the basis of race, color, national origin, age, disability, sex, sexual orientation, or gender identity.            Thank you!     Thank you for choosing [facility]  for your care. Our goal is always to provide you with excellent care. Hearing back from our patients is one way we can continue to improve our services. Please take a few minutes to complete the written survey that you may receive in the mail after your visit with us. Thank you!             Your Updated Medication List - Protect others around you: Learn how to safely use, store and throw away your medicines at www.disposemymeds.org.           This list is accurate as of 5/1/18  1:58 PM.  Always use your most recent med list.                   Brand Name Dispense Instructions for use Diagnosis    albuterol 108 (90 Base) MCG/ACT Inhaler    PROAIR HFA/PROVENTIL HFA/VENTOLIN HFA    2 Inhaler    Inhale 2 puffs into the lungs every 6 hours as needed for shortness of breath / dyspnea or wheezing    Mild persistent asthma without complication       budesonide-formoterol 160-4.5 MCG/ACT Inhaler    SYMBICORT    3 Inhaler    Inhale 2 puffs into the lungs 2 times daily    Mild persistent asthma without complication       cetirizine 10 MG tablet    ZYRTEC ALLERGY    90 tablet    Take 1 tablet (10 mg) by mouth daily    Seasonal allergic rhinitis, unspecified chronicity, unspecified trigger       hydrOXYzine 25 MG tablet    ATARAX    90 tablet    Take 1-2 tablets (25-50 mg) by mouth every 4 hours as needed for itching    Rash and nonspecific skin eruption       mometasone 50 MCG/ACT spray    NASONEX    3 Box    Spray 2 sprays into both nostrils daily    Seasonal allergic rhinitis, unspecified chronicity, unspecified trigger       ondansetron 4 MG ODT tab    ZOFRAN ODT    20 tablet    Take 1-2 tablets (4-8 mg) by mouth 3 times daily (before meals)    Nausea       polyethylene glycol powder    MIRALAX    510 g    Take 17 g (1 capful) by mouth daily    Constipation, unspecified constipation type       prenatal multivitamin plus iron 27-0.8 MG Tabs per tablet      Take 1 tablet by mouth daily

## 2018-05-01 NOTE — PROGRESS NOTES
Doing well.  Only concern today is an itchy rash which she developed initially on her low abdomen now was on her torso and arms mainly.  This is highly pruritic.  No change in any products.  Significant other does not have a similar rash.  Has felt well.  Trying cold showers but not any medications as she was not sure what was safe.  She does use Zyrtec on a daily basis due to seasonal allergies that are really flaring.  Never had a similar rash before.    On exam this rash does look like a possible PUPPS rash.  Small raised papular lesions without vesicles or comedones mainly on the torso heavily on the abdomen and the arms.  Highly pruritic during the appointment.  No signs of secondary infection.  She can increase Zyrtec to twice daily in divided doses-also given a prescription for hydroxyzine to use every 4-6 hours as needed.  Warned that this can be highly sedated.  Baking soda or oatmeal baths can be helpful as well.  Try to keep cool as far as skin temperature.    No vaginal bleeding, leakage, or contractions.  Baby's movement is active and frequent.  Taking PNV.  Discussed kick counts and fetal movement.  Signs/symptoms of hypertension and pre-eclampsia reviewed with patient today. She should contact the hospital OB dept if any new concerning symptoms.  Reminded of upcoming GBS swab.      RTC in 2 weeks.     Electronically signed by Darcy Duarte PA-C  5/1/2018

## 2018-05-14 ENCOUNTER — TELEPHONE (OUTPATIENT)
Dept: FAMILY MEDICINE | Facility: CLINIC | Age: 23
End: 2018-05-14

## 2018-05-14 NOTE — TELEPHONE ENCOUNTER
Called pt and informed her that we need to see her this week, I made her an appt for tomorrow.  Chantal Bland MA

## 2018-05-14 NOTE — TELEPHONE ENCOUNTER
Reason for Call:  Other call back    Detailed comments: Pt missed her appt this morning, you next available is 5/24, do you want her to schedule that or just wait til her 5/29 one? Please call her back and advise.     Phone Number Patient can be reached at: Home number on file [phone] (home)    Best Time: anytime    Can we leave a detailed message on this number? YES    Call taken on 5/14/2018 at 11:55 AM by Marie Siddiqi

## 2018-05-15 ENCOUNTER — PRENATAL OFFICE VISIT (OUTPATIENT)
Dept: FAMILY MEDICINE | Facility: CLINIC | Age: 23
End: 2018-05-15
Payer: COMMERCIAL

## 2018-05-15 VITALS
HEART RATE: 80 BPM | OXYGEN SATURATION: 98 % | BODY MASS INDEX: 39.31 KG/M2 | RESPIRATION RATE: 16 BRPM | TEMPERATURE: 96 F | DIASTOLIC BLOOD PRESSURE: 72 MMHG | WEIGHT: 229 LBS | SYSTOLIC BLOOD PRESSURE: 116 MMHG

## 2018-05-15 DIAGNOSIS — Z34.00 ENCOUNTER FOR SUPERVISION OF NORMAL FIRST PREGNANCY, UNSPECIFIED TRIMESTER: ICD-10-CM

## 2018-05-15 PROCEDURE — 99207 ZZC PRENATAL VISIT: CPT | Performed by: PHYSICIAN ASSISTANT

## 2018-05-15 ASSESSMENT — PAIN SCALES - GENERAL: PAINLEVEL: NO PAIN (0)

## 2018-05-15 NOTE — PROGRESS NOTES
Doing well.  No concerns today.  Rash has improved, but she does continue to take Zyrtec daily.  Also uses Atarax on an as-needed basis.  No vaginal bleeding, leakage, or contractions.  Baby's movement is active and frequent.  Taking PNV.  Discussed kick counts and fetal movement.  Signs/symptoms of hypertension and pre-eclampsia reviewed with patient today. She should contact the hospital OB dept if any new concerning symptoms.  Reminded of upcoming GBS swab.      RTC in 2 weeks.     Electronically signed by Darcy Duarte PA-C  5/15/2018

## 2018-05-15 NOTE — MR AVS SNAPSHOT
After Visit Summary   5/15/2018    Kena Sorto    MRN: [id number]           Patient Information     Date Of Birth          1995        Visit Information        Provider Department      5/15/2018 10:30 AM Darcy Duarte PA-C [facility]        Today's Diagnoses     Encounter for supervision of normal first pregnancy, unspecified trimester           Follow-ups after your visit        Your next 10 appointments already scheduled     May 29, 2018 10:30 AM CDT   ESTABLISHED PRENATAL with Darcy Duarte PA-C   [facility]    [address] [place] [address]   [phone]            Jun 13, 2018 10:30 AM CDT   ESTABLISHED PRENATAL with Darcy Duarte PA-C   [facility]    [address] [place] [address]   [phone]            Jun 18, 2018 10:30 AM CDT   ESTABLISHED PRENATAL with Darcy Duarte PA-C   [facility]    [address] [place] [address]   [phone]            Jun 25, 2018 10:30 AM CDT   ESTABLISHED PRENATAL with Darcy Duarte PA-C   [facility]    [address] [place] [address]   [phone]            Jul 02, 2018 10:30 AM CDT   ESTABLISHED PRENATAL with Darcy Duarte PA-C   [facility]    [address] [place] [address]   [phone]            Jul 09, 2018 11:00 AM CDT   ESTABLISHED PRENATAL with Ruddy Hart MD   [facility]    [address] [place] [address]   [phone]              Who to contact     If you have questions or need follow up information about today's clinic visit or your schedule please contact [facility] directly at [phone].  Normal or non-critical lab and  imaging results will be communicated to you by MyChart, letter or phone within 4 business days after the clinic has received the results. If you do not hear from us within 7 days, please contact the clinic through [employer] or phone. If you have a critical or abnormal lab result, we will notify you by phone as soon as possible.  Submit refill requests through [employer] or call your pharmacy and they will forward the refill request to us. Please allow 3 business days for your refill to be completed.          Additional Information About Your Visit        [employer] Information     [employer] gives you secure access to your electronic health record. If you see a primary care provider, you can also send messages to your care team and make appointments. If you have questions, please call your primary care clinic.  If you do not have a primary care provider, please call [phone] and they will assist you.        Care EveryWhere ID     This is your Care EveryWhere ID. This could be used by other organizations to access your [facility] medical records  [id number]        Your Vitals Were     Pulse Temperature Respirations Last Period Pulse Oximetry BMI (Body Mass Index)    80 96  F (35.6  C) (Temporal) 16 10/02/2017 98% 39.31 kg/m2       Blood Pressure from Last 3 Encounters:   05/15/18 116/72   05/01/18 120/82   04/16/18 120/80    Weight from Last 3 Encounters:   05/15/18 229 lb (103.9 kg)   05/01/18 225 lb (102.1 kg)   04/16/18 225 lb (102.1 kg)              Today, you had the following     No orders found for display       Primary Care Provider Office Phone # Fax #    Darcy Duarte PA-C [phone] [address] [place] [address]        Equal Access to Services     [facility] : Hadii ann-marie Johnson, wasusieda luqadaha, qaybta kaalmada nina, nino gomez. So [facility] [phone].    ATENCIÓN: Si habla español, tiene a bernal disposición servicios gratuitos de  asistencia lingüística. Diandra al [phone].    We comply with applicable federal civil rights laws and [place] laws. We do not discriminate on the basis of race, color, national origin, age, disability, sex, sexual orientation, or gender identity.            Thank you!     Thank you for choosing [facility]  for your care. Our goal is always to provide you with excellent care. Hearing back from our patients is one way we can continue to improve our services. Please take a few minutes to complete the written survey that you may receive in the mail after your visit with us. Thank you!             Your Updated Medication List - Protect others around you: Learn how to safely use, store and throw away your medicines at www.disposemymeds.org.          This list is accurate as of 5/15/18 10:53 AM.  Always use your most recent med list.                   Brand Name Dispense Instructions for use Diagnosis    albuterol 108 (90 Base) MCG/ACT Inhaler    PROAIR HFA/PROVENTIL HFA/VENTOLIN HFA    2 Inhaler    Inhale 2 puffs into the lungs every 6 hours as needed for shortness of breath / dyspnea or wheezing    Mild persistent asthma without complication       budesonide-formoterol 160-4.5 MCG/ACT Inhaler    SYMBICORT    3 Inhaler    Inhale 2 puffs into the lungs 2 times daily    Mild persistent asthma without complication       cetirizine 10 MG tablet    ZYRTEC ALLERGY    90 tablet    Take 1 tablet (10 mg) by mouth daily    Seasonal allergic rhinitis, unspecified chronicity, unspecified trigger       hydrOXYzine 25 MG tablet    ATARAX    90 tablet    Take 1-2 tablets (25-50 mg) by mouth every 4 hours as needed for itching    Rash and nonspecific skin eruption       mometasone 50 MCG/ACT spray    NASONEX    3 Box    Spray 2 sprays into both nostrils daily    Seasonal allergic rhinitis, unspecified chronicity, unspecified trigger       ondansetron 4 MG ODT tab    ZOFRAN ODT    20 tablet    Take 1-2 tablets (4-8  mg) by mouth 3 times daily (before meals)    Nausea       polyethylene glycol powder    MIRALAX    510 g    Take 17 g (1 capful) by mouth daily    Constipation, unspecified constipation type       prenatal multivitamin plus iron 27-0.8 MG Tabs per tablet      Take 1 tablet by mouth daily

## 2018-05-15 NOTE — NURSING NOTE
"Chief Complaint   Patient presents with     Prenatal Care       Initial /72  Pulse 80  Temp 96  F (35.6  C) (Temporal)  Resp 16  Wt 229 lb (103.9 kg)  LMP 10/02/2017  SpO2 98%  BMI 39.31 kg/m2 Estimated body mass index is 39.31 kg/(m^2) as calculated from the following:    Height as of 1/8/18: 5' 4\" (1.626 m).    Weight as of this encounter: 229 lb (103.9 kg).  BP completed using cuff size: levy Bland MA      "

## 2018-05-29 ENCOUNTER — PRENATAL OFFICE VISIT (OUTPATIENT)
Dept: FAMILY MEDICINE | Facility: CLINIC | Age: 23
End: 2018-05-29
Payer: COMMERCIAL

## 2018-05-29 VITALS
RESPIRATION RATE: 18 BRPM | WEIGHT: 233 LBS | TEMPERATURE: 98.1 F | BODY MASS INDEX: 39.99 KG/M2 | HEART RATE: 82 BPM | DIASTOLIC BLOOD PRESSURE: 70 MMHG | SYSTOLIC BLOOD PRESSURE: 116 MMHG | OXYGEN SATURATION: 97 %

## 2018-05-29 DIAGNOSIS — Z34.00 ENCOUNTER FOR SUPERVISION OF NORMAL FIRST PREGNANCY, UNSPECIFIED TRIMESTER: ICD-10-CM

## 2018-05-29 PROCEDURE — 99207 ZZC PRENATAL VISIT: CPT | Performed by: PHYSICIAN ASSISTANT

## 2018-05-29 ASSESSMENT — PAIN SCALES - GENERAL: PAINLEVEL: NO PAIN (0)

## 2018-05-29 NOTE — NURSING NOTE
"Chief Complaint   Patient presents with     Prenatal Care       Initial /70  Pulse 82  Temp 98.1  F (36.7  C) (Temporal)  Resp 18  Wt 233 lb (105.7 kg)  LMP 10/02/2017  SpO2 97%  BMI 39.99 kg/m2 Estimated body mass index is 39.99 kg/(m^2) as calculated from the following:    Height as of 1/8/18: 5' 4\" (1.626 m).    Weight as of this encounter: 233 lb (105.7 kg).  BP completed using cuff size: levy Bland MA      "

## 2018-05-29 NOTE — MR AVS SNAPSHOT
After Visit Summary   5/29/2018    Kena Sorto    MRN: [id number]           Patient Information     Date Of Birth          1995        Visit Information        Provider Department      5/29/2018 10:30 AM Darcy Duarte PA-C [facility]        Today's Diagnoses     Encounter for supervision of normal first pregnancy, unspecified trimester           Follow-ups after your visit        Your next 10 appointments already scheduled     Jun 13, 2018 10:30 AM CDT   ESTABLISHED PRENATAL with Darcy Duarte PA-C   [facility]    [address] [place] [address]   [phone]            Jun 18, 2018 10:30 AM CDT   ESTABLISHED PRENATAL with Darcy Duarte PA-C   [facility]    [address] [place] [address]   [phone]            Jun 25, 2018 10:30 AM CDT   ESTABLISHED PRENATAL with Darcy Duarte PA-C   [facility]    [address] [place] [address]   [phone]            Jul 02, 2018 10:30 AM CDT   ESTABLISHED PRENATAL with Darcy Duarte PA-C   [facility]    [address] [place] [address]   [phone]            Jul 09, 2018 11:00 AM CDT   ESTABLISHED PRENATAL with Ruddy Hart MD   [facility]    [address] [place] [address]   [phone]              Who to contact     If you have questions or need follow up information about today's clinic visit or your schedule please contact [facility] directly at [phone].  Normal or non-critical lab and imaging results will be communicated to you by MyChart, letter or phone within 4 business days after the clinic has received the results. If you do not hear from us within 7 days, please contact the clinic  through [employer] or phone. If you have a critical or abnormal lab result, we will notify you by phone as soon as possible.  Submit refill requests through [employer] or call your pharmacy and they will forward the refill request to us. Please allow 3 business days for your refill to be completed.          Additional Information About Your Visit        [employer] Information     [employer] gives you secure access to your electronic health record. If you see a primary care provider, you can also send messages to your care team and make appointments. If you have questions, please call your primary care clinic.  If you do not have a primary care provider, please call [phone] and they will assist you.        Care EveryWhere ID     This is your Care EveryWhere ID. This could be used by other organizations to access your [facility] medical records  [id number]        Your Vitals Were     Pulse Temperature Respirations Last Period Pulse Oximetry BMI (Body Mass Index)    82 98.1  F (36.7  C) (Temporal) 18 10/02/2017 97% 39.99 kg/m2       Blood Pressure from Last 3 Encounters:   05/29/18 116/70   05/15/18 116/72   05/01/18 120/82    Weight from Last 3 Encounters:   05/29/18 233 lb (105.7 kg)   05/15/18 229 lb (103.9 kg)   05/01/18 225 lb (102.1 kg)              Today, you had the following     No orders found for display       Primary Care Provider Office Phone # Fax #    Darcy Duarte PA-C [phone]       [address] [place] [address]        Equal Access to Services     [facility] AH: Hadii aad ku hadasho Soomaali, waaxda luqadaha, qaybta kaalmada adeegyada, nino doll . So [facility] [phone].    ATENCIÓN: Si habla español, tiene a bernal disposición servicios gratuitos de asistencia lingüística. Llame al [phone].    We comply with applicable federal civil rights laws and [place] laws. We do not discriminate on the basis of race, color, national origin, age, disability,  sex, sexual orientation, or gender identity.            Thank you!     Thank you for choosing [facility]  for your care. Our goal is always to provide you with excellent care. Hearing back from our patients is one way we can continue to improve our services. Please take a few minutes to complete the written survey that you may receive in the mail after your visit with us. Thank you!             Your Updated Medication List - Protect others around you: Learn how to safely use, store and throw away your medicines at www.disposemymeds.org.          This list is accurate as of 5/29/18 11:22 AM.  Always use your most recent med list.                   Brand Name Dispense Instructions for use Diagnosis    albuterol 108 (90 Base) MCG/ACT Inhaler    PROAIR HFA/PROVENTIL HFA/VENTOLIN HFA    2 Inhaler    Inhale 2 puffs into the lungs every 6 hours as needed for shortness of breath / dyspnea or wheezing    Mild persistent asthma without complication       budesonide-formoterol 160-4.5 MCG/ACT Inhaler    SYMBICORT    3 Inhaler    Inhale 2 puffs into the lungs 2 times daily    Mild persistent asthma without complication       cetirizine 10 MG tablet    ZYRTEC ALLERGY    90 tablet    Take 1 tablet (10 mg) by mouth daily    Seasonal allergic rhinitis, unspecified chronicity, unspecified trigger       hydrOXYzine 25 MG tablet    ATARAX    90 tablet    Take 1-2 tablets (25-50 mg) by mouth every 4 hours as needed for itching    Rash and nonspecific skin eruption       mometasone 50 MCG/ACT spray    NASONEX    3 Box    Spray 2 sprays into both nostrils daily    Seasonal allergic rhinitis, unspecified chronicity, unspecified trigger       ondansetron 4 MG ODT tab    ZOFRAN ODT    20 tablet    Take 1-2 tablets (4-8 mg) by mouth 3 times daily (before meals)    Nausea       polyethylene glycol powder    MIRALAX    510 g    Take 17 g (1 capful) by mouth daily    Constipation, unspecified constipation type       prenatal  multivitamin plus iron 27-0.8 MG Tabs per tablet      Take 1 tablet by mouth daily

## 2018-05-29 NOTE — PROGRESS NOTES
Doing well.  No concerns today.  No vaginal bleeding, leakage, or contractions.  Baby's movement is active and frequent.  Taking PNV.  Discussed kick counts and fetal movement.  Signs/symptoms of hypertension and pre-eclampsia reviewed with patient today. She should contact the hospital OB dept if any new concerning symptoms.  Reminded of upcoming GBS swab.      RTC in 2 weeks.     Electronically signed by Darcy Duarte PA-C  5/29/2018

## 2018-06-10 DIAGNOSIS — R21 RASH AND NONSPECIFIC SKIN ERUPTION: ICD-10-CM

## 2018-06-11 NOTE — TELEPHONE ENCOUNTER
"Requested Prescriptions   Pending Prescriptions Disp Refills     hydrOXYzine (ATARAX) 25 MG tablet [Pharmacy Med Name: HYDROXYZINE HCL 25MG TABS] 90 tablet 1     Sig: TAKE ONE TO TWO TABLETS BY MOUTH EVERY 4 HOURS AS NEEDED FOR ITCHING    Antihistamines Protocol Passed    6/10/2018 10:47 PM       Passed - Recent (12 mo) or future (30 days) visit within the authorizing provider's specialty    Patient had office visit in the last 12 months or has a visit in the next 30 days with authorizing provider or within the authorizing provider's specialty.  See \"Patient Info\" tab in inbasket, or \"Choose Columns\" in Meds & Orders section of the refill encounter.           Passed - Patient is age 3 or older    Apply age and/or weight-based dosing for peds patients age 3 and older.    Forward request to provider for patients under the age of 3.            Last Written Prescription Date:  5/1/18  Last Fill Quantity: 90,  # refills: 1   Last Office Visit with [facility] or [facility] prescribing provider:  5/29/18   Future Office Visit:    Next 5 appointments (look out 90 days)     Jun 13, 2018 10:30 AM CDT   ESTABLISHED PRENATAL with Darcy Duarte PA-C   [facility] ([facility])    [address] [place] [address]   [phone]            Jun 18, 2018 10:30 AM CDT   ESTABLISHED PRENATAL with Darcy Duarte PA-C   [facility] ([facility])    [address] [place] [address]   [phone]            Jun 25, 2018 10:30 AM CDT   ESTABLISHED PRENATAL with Darcy Duarte PA-C   [facility] ([facility])    [address] [place] [address]   [phone]            Jul 02, 2018 10:30 AM CDT   ESTABLISHED PRENATAL with Darcy Duarte PA-C   [facility] ([facility])    [address] [place] [address]   [phone]            Jul 09, 2018 11:00 AM CDT "   ESTABLISHED PRENATAL with Ruddy Hart MD   [facility] ([facility])    [address] [address] [phone]

## 2018-06-12 RX ORDER — HYDROXYZINE HYDROCHLORIDE 25 MG/1
TABLET, FILM COATED ORAL
Qty: 90 TABLET | Refills: 1 | Status: SHIPPED | OUTPATIENT
Start: 2018-06-12 | End: 2018-08-23

## 2018-06-13 ENCOUNTER — PRENATAL OFFICE VISIT (OUTPATIENT)
Dept: FAMILY MEDICINE | Facility: CLINIC | Age: 23
End: 2018-06-13
Payer: COMMERCIAL

## 2018-06-13 VITALS
DIASTOLIC BLOOD PRESSURE: 82 MMHG | TEMPERATURE: 98 F | WEIGHT: 238 LBS | HEART RATE: 90 BPM | OXYGEN SATURATION: 99 % | RESPIRATION RATE: 18 BRPM | BODY MASS INDEX: 40.85 KG/M2 | SYSTOLIC BLOOD PRESSURE: 118 MMHG

## 2018-06-13 DIAGNOSIS — Z34.00 ENCOUNTER FOR SUPERVISION OF NORMAL FIRST PREGNANCY, UNSPECIFIED TRIMESTER: ICD-10-CM

## 2018-06-13 LAB — HGB BLD-MCNC: 12.6 G/DL (ref 11.7–15.7)

## 2018-06-13 PROCEDURE — 00000218 ZZHCL STATISTIC OBHBG - HEMOGLOBIN: Performed by: PHYSICIAN ASSISTANT

## 2018-06-13 PROCEDURE — 87653 STREP B DNA AMP PROBE: CPT | Performed by: PHYSICIAN ASSISTANT

## 2018-06-13 PROCEDURE — 99207 ZZC PRENATAL VISIT: CPT | Performed by: PHYSICIAN ASSISTANT

## 2018-06-13 PROCEDURE — 36415 COLL VENOUS BLD VENIPUNCTURE: CPT | Performed by: PHYSICIAN ASSISTANT

## 2018-06-13 ASSESSMENT — PAIN SCALES - GENERAL: PAINLEVEL: NO PAIN (0)

## 2018-06-13 NOTE — MR AVS SNAPSHOT
After Visit Summary   6/13/2018    Kena Sorto    MRN: [id number]           Patient Information     Date Of Birth          1995        Visit Information        Provider Department      6/13/2018 10:30 AM Darcy Duarte PA-C [facility]        Today's Diagnoses     Encounter for supervision of normal first pregnancy, unspecified trimester           Follow-ups after your visit        Your next 10 appointments already scheduled     Jun 18, 2018 10:30 AM CDT   ESTABLISHED PRENATAL with Darcy Duarte PA-C   [facility] ([address] [address]   [phone]            Jun 25, 2018 10:30 AM CDT   ESTABLISHED PRENATAL with Darcy Duarte PA-C   [facility] ([address] [address]   [phone]            Jul 02, 2018 10:30 AM CDT   ESTABLISHED PRENATAL with Darcy Duarte PA-C   [facility] ([facility])    [address] [place] [address]   [phone]            Jul 09, 2018 11:00 AM CDT   ESTABLISHED PRENATAL with Ruddy Hart MD   [facility] ([address] [address]   [phone]              Who to contact     If you have questions or need follow up information about today's clinic visit or your schedule please contact [facility] directly at [phone].  Normal or non-critical lab and imaging results will be communicated to you by MyChart, letter or phone within 4 business days after the clinic has received the results. If you do not hear from us within 7 days, please contact the clinic through [employer] or phone. If you have a critical or abnormal lab result, we will notify you by phone as soon as possible.  Submit refill requests through [employer] or call your pharmacy and they will forward the  refill request to us. Please allow 3 business days for your refill to be completed.          Additional Information About Your Visit        MyChart Information     [employer] gives you secure access to your electronic health record. If you see a primary care provider, you can also send messages to your care team and make appointments. If you have questions, please call your primary care clinic.  If you do not have a primary care provider, please call [phone] and they will assist you.        Care EveryWhere ID     This is your Care EveryWhere ID. This could be used by other organizations to access your [facility] medical records  [id number]        Your Vitals Were     Pulse Temperature Respirations Last Period Pulse Oximetry BMI (Body Mass Index)    90 98  F (36.7  C) (Temporal) 18 10/02/2017 99% 40.85 kg/m2       Blood Pressure from Last 3 Encounters:   06/13/18 118/82   05/29/18 116/70   05/15/18 116/72    Weight from Last 3 Encounters:   06/13/18 238 lb (108 kg)   05/29/18 233 lb (105.7 kg)   05/15/18 229 lb (103.9 kg)              We Performed the Following     OB hemoglobin     Strep, Group B by PCR        Primary Care Provider Office Phone # Fax #    Darcy Duarte PA-C [phone] [address] [place] [address]        Equal Access to Services     JOAQUINA MOORE : Hadii aad ku hadasho Soomaali, waaxda luqadaha, qaybta kaalmada adeegyada, waxlyn doll . So [facility] [phone].    ATENCIÓN: Si habla español, tiene a bernal disposición servicios gratuitos de asistencia lingüística. Llame al [phone].    We comply with applicable federal civil rights laws and [place] laws. We do not discriminate on the basis of race, color, national origin, age, disability, sex, sexual orientation, or gender identity.            Thank you!     Thank you for choosing [facility]  for your care. Our goal is always to provide you with excellent care. Hearing back  from our patients is one way we can continue to improve our services. Please take a few minutes to complete the written survey that you may receive in the mail after your visit with us. Thank you!             Your Updated Medication List - Protect others around you: Learn how to safely use, store and throw away your medicines at www.disposemymeds.org.          This list is accurate as of 6/13/18  5:42 PM.  Always use your most recent med list.                   Brand Name Dispense Instructions for use Diagnosis    albuterol 108 (90 Base) MCG/ACT Inhaler    PROAIR HFA/PROVENTIL HFA/VENTOLIN HFA    2 Inhaler    Inhale 2 puffs into the lungs every 6 hours as needed for shortness of breath / dyspnea or wheezing    Mild persistent asthma without complication       budesonide-formoterol 160-4.5 MCG/ACT Inhaler    SYMBICORT    3 Inhaler    Inhale 2 puffs into the lungs 2 times daily    Mild persistent asthma without complication       cetirizine 10 MG tablet    ZYRTEC ALLERGY    90 tablet    Take 1 tablet (10 mg) by mouth daily    Seasonal allergic rhinitis, unspecified chronicity, unspecified trigger       hydrOXYzine 25 MG tablet    ATARAX    90 tablet    TAKE ONE TO TWO TABLETS BY MOUTH EVERY 4 HOURS AS NEEDED FOR ITCHING    Rash and nonspecific skin eruption       mometasone 50 MCG/ACT spray    NASONEX    3 Box    Spray 2 sprays into both nostrils daily    Seasonal allergic rhinitis, unspecified chronicity, unspecified trigger       ondansetron 4 MG ODT tab    ZOFRAN ODT    20 tablet    Take 1-2 tablets (4-8 mg) by mouth 3 times daily (before meals)    Nausea       polyethylene glycol powder    MIRALAX    510 g    Take 17 g (1 capful) by mouth daily    Constipation, unspecified constipation type       prenatal multivitamin plus iron 27-0.8 MG Tabs per tablet      Take 1 tablet by mouth daily

## 2018-06-13 NOTE — PROGRESS NOTES
Doing well.  Only concern today is carpal tunnel symptoms in her right hand.  She is noting the palmar surface and the second, third, fourth fingers tingly and sometimes difficult to move due to swelling.  She has had no temperature change.  No history of carpal tunnel.  This is notable both day and night.  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  I offered her a wrist splint today which she decided to take.  Mainly should wear this at nighttime, but if it offers her some help in the day can do this as well.  She does have increased peripheral edema today.  Blood pressure is normal.  Urged to consider compression stockings, elevation of extremities when possible, and avoid salt if possible.  Try to push fluids.  Continues to take PNV.  No vaginal bleeding, leakage, or contractions.  Baby's movement is active and frequent.  Discussed signs of labor and when to call or come in.   Discussed kick counts and fetal movement.  GBS was done today.  Serum labs including hemoglobin done today as well.  Please call if persistent HA, blurred vision, or swelling  She will report to OB if contractions every 5-10 minutes or if rupture of membranes.  RTC in 1 week    Electronically signed by Daryc Duarte PA-C  6/13/2018

## 2018-06-13 NOTE — NURSING NOTE
"Chief Complaint   Patient presents with     Prenatal Care       Initial /82  Pulse 90  Temp 98  F (36.7  C) (Temporal)  Resp 18  Wt 238 lb (108 kg)  LMP 10/02/2017  SpO2 99%  BMI 40.85 kg/m2 Estimated body mass index is 40.85 kg/(m^2) as calculated from the following:    Height as of 1/8/18: 5' 4\" (1.626 m).    Weight as of this encounter: 238 lb (108 kg).  BP completed using cuff size: levy Bland MA      "

## 2018-06-14 ENCOUNTER — MYC MEDICAL ADVICE (OUTPATIENT)
Dept: FAMILY MEDICINE | Facility: CLINIC | Age: 23
End: 2018-06-14

## 2018-06-14 LAB
GP B STREP DNA SPEC QL NAA+PROBE: NEGATIVE
SPECIMEN SOURCE: NORMAL

## 2018-06-18 ENCOUNTER — PRENATAL OFFICE VISIT (OUTPATIENT)
Dept: FAMILY MEDICINE | Facility: CLINIC | Age: 23
End: 2018-06-18
Payer: COMMERCIAL

## 2018-06-18 VITALS
DIASTOLIC BLOOD PRESSURE: 72 MMHG | BODY MASS INDEX: 41.37 KG/M2 | TEMPERATURE: 97.4 F | HEART RATE: 80 BPM | SYSTOLIC BLOOD PRESSURE: 120 MMHG | OXYGEN SATURATION: 99 % | WEIGHT: 241 LBS | RESPIRATION RATE: 18 BRPM

## 2018-06-18 DIAGNOSIS — Z34.00 ENCOUNTER FOR SUPERVISION OF NORMAL FIRST PREGNANCY, UNSPECIFIED TRIMESTER: ICD-10-CM

## 2018-06-18 PROCEDURE — 99207 ZZC PRENATAL VISIT: CPT | Performed by: PHYSICIAN ASSISTANT

## 2018-06-18 ASSESSMENT — PAIN SCALES - GENERAL: PAINLEVEL: NO PAIN (0)

## 2018-06-18 NOTE — PROGRESS NOTES
Doing well.  No concerns today. Continues to take PNV.  No vaginal bleeding, leakage, or contractions.  Baby's movement is active and frequent.  Discussed signs of labor and when to call or come in.   Discussed kick counts and fetal movement.  GBS returned  negative.  Please call if persistent HA, blurred vision, or swelling  She will report to OB if contractions every 5-10 minutes or if rupture of membranes.  RTC in 1 week    Electronically signed by Darcy Duarte PA-C  6/18/2018

## 2018-06-18 NOTE — MR AVS SNAPSHOT
After Visit Summary   6/18/2018    Kena Sorto    MRN: [id number]           Patient Information     Date Of Birth          1995        Visit Information        Provider Department      6/18/2018 10:30 AM Darcy Duarte PA-C [facility]        Today's Diagnoses     Encounter for supervision of normal first pregnancy, unspecified trimester           Follow-ups after your visit        Your next 10 appointments already scheduled     Jun 25, 2018 10:30 AM CDT   ESTABLISHED PRENATAL with Darcy Duarte PA-C   [facility] ([address] [id number] [phone]            Jul 02, 2018 10:30 AM CDT   ESTABLISHED PRENATAL with Darcy Duarte PA-C   [facility] ([address] [phone]            Jul 09, 2018 11:00 AM CDT   ESTABLISHED PRENATAL with Ruddy Hart MD   [facility]    [address] [place] [id number] [phone]              Who to contact     If you have questions or need follow up information about today's clinic visit or your schedule please contact [facility] directly at [phone].  Normal or non-critical lab and imaging results will be communicated to you by MyChart, letter or phone within 4 business days after the clinic has received the results. If you do not hear from us within 7 days, please contact the clinic through MyChart or phone. If you have a critical or abnormal lab result, we will notify you by phone as soon as possible.  Submit refill requests through Spotb[employer] or call your pharmacy and they will forward the refill request to us. Please allow 3 business days for your refill to be completed.          Additional Information About Your Visit        [employer] Information     Spotbros gives you secure access to your electronic  health record. If you see a primary care provider, you can also send messages to your care team and make appointments. If you have questions, please call your primary care clinic.  If you do not have a primary care provider, please call [phone] and they will assist you.        Care EveryWhere ID     This is your Care EveryWhere ID. This could be used by other organizations to access your [place] medical records  [id number]        Your Vitals Were     Pulse Temperature Respirations Last Period Pulse Oximetry BMI (Body Mass Index)    80 97.4  F (36.3  C) (Temporal) 18 10/02/2017 99% 41.37 kg/m2       Blood Pressure from Last 3 Encounters:   06/18/18 120/72   06/13/18 118/82   05/29/18 116/70    Weight from Last 3 Encounters:   06/18/18 241 lb (109.3 kg)   06/13/18 238 lb (108 kg)   05/29/18 233 lb (105.7 kg)              Today, you had the following     No orders found for display       Primary Care Provider Office Phone # Fax #    Darcy Duarte PA-C [phone] [address] [place] [address]        Equal Access to Services     JOAQUINA MOORE AH: Hadii aad ku hadasho Soomaali, waaxda luqadaha, qaybta kaalmada adeegyada, nino gustafsonn benedicto gomez. So [facility] [phone].    ATENCIÓN: Si habla español, tiene a bernal disposición servicios gratuitos de asistencia lingüística. [facility] [phone].    We comply with applicable federal civil rights laws and [place] laws. We do not discriminate on the basis of race, color, national origin, age, disability, sex, sexual orientation, or gender identity.            Thank you!     Thank you for choosing [facility]  for your care. Our goal is always to provide you with excellent care. Hearing back from our patients is one way we can continue to improve our services. Please take a few minutes to complete the written survey that you may receive in the mail after your visit with us. Thank you!             Your Updated  Medication List - Protect others around you: Learn how to safely use, store and throw away your medicines at www.disposemymeds.org.          This list is accurate as of 6/18/18 12:36 PM.  Always use your most recent med list.                   Brand Name Dispense Instructions for use Diagnosis    albuterol 108 (90 Base) MCG/ACT Inhaler    PROAIR HFA/PROVENTIL HFA/VENTOLIN HFA    2 Inhaler    Inhale 2 puffs into the lungs every 6 hours as needed for shortness of breath / dyspnea or wheezing    Mild persistent asthma without complication       budesonide-formoterol 160-4.5 MCG/ACT Inhaler    SYMBICORT    3 Inhaler    Inhale 2 puffs into the lungs 2 times daily    Mild persistent asthma without complication       cetirizine 10 MG tablet    ZYRTEC ALLERGY    90 tablet    Take 1 tablet (10 mg) by mouth daily    Seasonal allergic rhinitis, unspecified chronicity, unspecified trigger       hydrOXYzine 25 MG tablet    ATARAX    90 tablet    TAKE ONE TO TWO TABLETS BY MOUTH EVERY 4 HOURS AS NEEDED FOR ITCHING    Rash and nonspecific skin eruption       mometasone 50 MCG/ACT spray    NASONEX    3 Box    Spray 2 sprays into both nostrils daily    Seasonal allergic rhinitis, unspecified chronicity, unspecified trigger       ondansetron 4 MG ODT tab    ZOFRAN ODT    20 tablet    Take 1-2 tablets (4-8 mg) by mouth 3 times daily (before meals)    Nausea       polyethylene glycol powder    MIRALAX    510 g    Take 17 g (1 capful) by mouth daily    Constipation, unspecified constipation type       prenatal multivitamin plus iron 27-0.8 MG Tabs per tablet      Take 1 tablet by mouth daily

## 2018-06-18 NOTE — NURSING NOTE
"Chief Complaint   Patient presents with     Prenatal Care       Initial /72  Pulse 80  Temp 97.4  F (36.3  C) (Temporal)  Resp 18  Wt 241 lb (109.3 kg)  LMP 10/02/2017  SpO2 99%  BMI 41.37 kg/m2 Estimated body mass index is 41.37 kg/(m^2) as calculated from the following:    Height as of 1/8/18: 5' 4\" (1.626 m).    Weight as of this encounter: 241 lb (109.3 kg).  BP completed using cuff size: levy Bland MA      "

## 2018-06-25 ENCOUNTER — PRENATAL OFFICE VISIT (OUTPATIENT)
Dept: FAMILY MEDICINE | Facility: CLINIC | Age: 23
End: 2018-06-25
Payer: COMMERCIAL

## 2018-06-25 VITALS
TEMPERATURE: 97.4 F | WEIGHT: 243 LBS | SYSTOLIC BLOOD PRESSURE: 120 MMHG | RESPIRATION RATE: 18 BRPM | HEART RATE: 90 BPM | OXYGEN SATURATION: 96 % | BODY MASS INDEX: 41.71 KG/M2 | DIASTOLIC BLOOD PRESSURE: 78 MMHG

## 2018-06-25 DIAGNOSIS — Z34.00 ENCOUNTER FOR SUPERVISION OF NORMAL FIRST PREGNANCY, UNSPECIFIED TRIMESTER: ICD-10-CM

## 2018-06-25 PROCEDURE — 99207 ZZC PRENATAL VISIT: CPT | Performed by: PHYSICIAN ASSISTANT

## 2018-06-25 ASSESSMENT — PAIN SCALES - GENERAL: PAINLEVEL: EXTREME PAIN (8)

## 2018-06-25 NOTE — PROGRESS NOTES
Doing well.  No concerns today. Continues to take PNV.  No vaginal bleeding, leakage, or contractions.  Baby's movement is active and frequent.  Discussed signs of labor and when to call or come in.   Discussed kick counts and fetal movement.  Please call if persistent HA, blurred vision, or swelling  She will report to OB if contractions every 5-10 minutes or if rupture of membranes.  RTC in 1 week    Electronically signed by Darcy Duarte PA-C  6/25/2018

## 2018-06-25 NOTE — NURSING NOTE
"Chief Complaint   Patient presents with     Prenatal Care       Initial /78  Pulse 90  Temp 97.4  F (36.3  C) (Temporal)  Resp 18  Wt 243 lb (110.2 kg)  LMP 10/02/2017  SpO2 96%  BMI 41.71 kg/m2 Estimated body mass index is 41.71 kg/(m^2) as calculated from the following:    Height as of 1/8/18: 5' 4\" (1.626 m).    Weight as of this encounter: 243 lb (110.2 kg).  BP completed using cuff size: levy Bland MA      "

## 2018-06-25 NOTE — MR AVS SNAPSHOT
After Visit Summary   6/25/2018    Kena Sorto    MRN: [id number]           Patient Information     Date Of Birth          1995        Visit Information        Provider Department      6/25/2018 10:30 AM Darcy Duarte PA-C [facility]         Follow-ups after your visit        Your next 10 appointments already scheduled     Jun 25, 2018 10:30 AM CDT   ESTABLISHED PRENATAL with Darcy Duarte PA-C   [facility]    [address] [place] [id number] [phone]            Jul 02, 2018 10:30 AM CDT   ESTABLISHED PRENATAL with Darcy Duarte PA-C   [facility]    [address] [place] [id number] [phone]            Jul 09, 2018 11:00 AM CDT   ESTABLISHED PRENATAL with Ruddy Hart MD   [facility]    [address] [place] [id number] [phone]              Who to contact     If you have questions or need follow up information about today's clinic visit or your schedule please contact [facility] directly at [phone].  Normal or non-critical lab and imaging results will be communicated to you by MyChart, letter or phone within 4 business days after the clinic has received the results. If you do not hear from us within 7 days, please contact the clinic through [email or web address] or phone. If you have a critical or abnormal lab result, we will notify you by phone as soon as possible.  Submit refill requests through [employer] or call your pharmacy and they will forward the refill request to us. Please allow 3 business days for your refill to be completed.          Additional Information About Your Visit        [employer] Information     [employer] gives you secure access to your electronic health record. If you see a primary care provider, you can also send messages to your care team and make  appointments. If you have questions, please call your primary care clinic.  If you do not have a primary care provider, please call [phone] and they will assist you.        Care EveryWhere ID     This is your Care EveryWhere ID. This could be used by other organizations to access your [place] medical records  [id number]        Your Vitals Were     Last Period                   10/02/2017            Blood Pressure from Last 3 Encounters:   06/18/18 120/72   06/13/18 118/82   05/29/18 116/70    Weight from Last 3 Encounters:   06/18/18 241 lb (109.3 kg)   06/13/18 238 lb (108 kg)   05/29/18 233 lb (105.7 kg)              Today, you had the following     No orders found for display       Primary Care Provider Office Phone # Fax #    Darcy Duarte PA-C [phone] [address] [place] [address]        Equal Access to Services     JOAQUINA MOORE : Hadii aad ku hadasho Soomaali, waaxda luqadaha, qaybta kaalmada adeegyada, waxay brianne doll . So [facility] [phone].    ATENCIÓN: Si habla español, tiene a bernal disposición servicios gratuitos de asistencia lingüística. Llame al [phone].    We comply with applicable federal civil rights laws and [place] laws. We do not discriminate on the basis of race, color, national origin, age, disability, sex, sexual orientation, or gender identity.            Thank you!     Thank you for choosing [facility]  for your care. Our goal is always to provide you with excellent care. Hearing back from our patients is one way we can continue to improve our services. Please take a few minutes to complete the written survey that you may receive in the mail after your visit with us. Thank you!             Your Updated Medication List - Protect others around you: Learn how to safely use, store and throw away your medicines at www.disposemymeds.org.          This list is accurate as of 6/25/18 10:29 AM.  Always use your  most recent med list.                   Brand Name Dispense Instructions for use Diagnosis    albuterol 108 (90 Base) MCG/ACT Inhaler    PROAIR HFA/PROVENTIL HFA/VENTOLIN HFA    2 Inhaler    Inhale 2 puffs into the lungs every 6 hours as needed for shortness of breath / dyspnea or wheezing    Mild persistent asthma without complication       budesonide-formoterol 160-4.5 MCG/ACT Inhaler    SYMBICORT    3 Inhaler    Inhale 2 puffs into the lungs 2 times daily    Mild persistent asthma without complication       cetirizine 10 MG tablet    ZYRTEC ALLERGY    90 tablet    Take 1 tablet (10 mg) by mouth daily    Seasonal allergic rhinitis, unspecified chronicity, unspecified trigger       hydrOXYzine 25 MG tablet    ATARAX    90 tablet    TAKE ONE TO TWO TABLETS BY MOUTH EVERY 4 HOURS AS NEEDED FOR ITCHING    Rash and nonspecific skin eruption       mometasone 50 MCG/ACT spray    NASONEX    3 Box    Spray 2 sprays into both nostrils daily    Seasonal allergic rhinitis, unspecified chronicity, unspecified trigger       ondansetron 4 MG ODT tab    ZOFRAN ODT    20 tablet    Take 1-2 tablets (4-8 mg) by mouth 3 times daily (before meals)    Nausea       polyethylene glycol powder    MIRALAX    510 g    Take 17 g (1 capful) by mouth daily    Constipation, unspecified constipation type       prenatal multivitamin plus iron 27-0.8 MG Tabs per tablet      Take 1 tablet by mouth daily

## 2018-06-29 ENCOUNTER — APPOINTMENT (OUTPATIENT)
Dept: ULTRASOUND IMAGING | Facility: CLINIC | Age: 23
End: 2018-06-29
Attending: FAMILY MEDICINE
Payer: COMMERCIAL

## 2018-06-29 ENCOUNTER — HOSPITAL ENCOUNTER (INPATIENT)
Facility: CLINIC | Age: 23
LOS: 3 days | Discharge: HOME | End: 2018-07-02
Attending: FAMILY MEDICINE | Admitting: FAMILY MEDICINE
Payer: COMMERCIAL

## 2018-06-29 ENCOUNTER — E-VISIT (OUTPATIENT)
Dept: FAMILY MEDICINE | Facility: CLINIC | Age: 23
End: 2018-06-29
Payer: COMMERCIAL

## 2018-06-29 ENCOUNTER — ANESTHESIA (OUTPATIENT)
Dept: OBGYN | Facility: CLINIC | Age: 23
End: 2018-06-29
Payer: COMMERCIAL

## 2018-06-29 ENCOUNTER — ANESTHESIA EVENT (OUTPATIENT)
Dept: OBGYN | Facility: CLINIC | Age: 23
End: 2018-06-29
Payer: COMMERCIAL

## 2018-06-29 DIAGNOSIS — O26.90 COMPLICATION OF PREGNANCY, ANTEPARTUM, UNSPECIFIED TRIMESTER: Primary | ICD-10-CM

## 2018-06-29 PROBLEM — O42.90 AMNIOTIC FLUID LEAKING: Status: ACTIVE | Noted: 2018-06-29

## 2018-06-29 PROBLEM — Z36.89 ENCOUNTER FOR TRIAGE IN PREGNANT PATIENT: Status: ACTIVE | Noted: 2018-06-29

## 2018-06-29 LAB
ABO + RH BLD: NORMAL
ABO + RH BLD: NORMAL
RUPTURE OF FETAL MEMBRANES BY ROM PLUS: POSITIVE
SPECIMEN EXP DATE BLD: NORMAL
SPECIMEN SOURCE: ABNORMAL
WET PREP SPEC: ABNORMAL

## 2018-06-29 PROCEDURE — 86901 BLOOD TYPING SEROLOGIC RH(D): CPT | Performed by: FAMILY MEDICINE

## 2018-06-29 PROCEDURE — 25000128 H RX IP 250 OP 636: Performed by: FAMILY MEDICINE

## 2018-06-29 PROCEDURE — 12000031 ZZH R&B OB CRITICAL

## 2018-06-29 PROCEDURE — G0463 HOSPITAL OUTPT CLINIC VISIT: HCPCS | Mod: 25

## 2018-06-29 PROCEDURE — 84112 EVAL AMNIOTIC FLUID PROTEIN: CPT | Performed by: FAMILY MEDICINE

## 2018-06-29 PROCEDURE — 25000125 ZZHC RX 250

## 2018-06-29 PROCEDURE — 25000128 H RX IP 250 OP 636: Performed by: NURSE ANESTHETIST, CERTIFIED REGISTERED

## 2018-06-29 PROCEDURE — 99207 ZZC NO BILLABLE SERVICE THIS VISIT: CPT | Performed by: PHYSICIAN ASSISTANT

## 2018-06-29 PROCEDURE — 76819 FETAL BIOPHYS PROFIL W/O NST: CPT

## 2018-06-29 PROCEDURE — 87210 SMEAR WET MOUNT SALINE/INK: CPT | Performed by: FAMILY MEDICINE

## 2018-06-29 PROCEDURE — 25000125 ZZHC RX 250: Performed by: FAMILY MEDICINE

## 2018-06-29 PROCEDURE — 40000671 ZZH STATISTIC ANESTHESIA CASE

## 2018-06-29 PROCEDURE — 86780 TREPONEMA PALLIDUM: CPT | Performed by: FAMILY MEDICINE

## 2018-06-29 PROCEDURE — 37000011 ZZH ANESTHESIA WARD SERVICE: Performed by: NURSE ANESTHETIST, CERTIFIED REGISTERED

## 2018-06-29 PROCEDURE — 3E0R3BZ INTRODUCTION OF ANESTHETIC AGENT INTO SPINAL CANAL, PERCUTANEOUS APPROACH: ICD-10-PCS | Performed by: FAMILY MEDICINE

## 2018-06-29 PROCEDURE — 59025 FETAL NON-STRESS TEST: CPT

## 2018-06-29 PROCEDURE — 25000125 ZZHC RX 250: Performed by: NURSE ANESTHETIST, CERTIFIED REGISTERED

## 2018-06-29 PROCEDURE — 00HU33Z INSERTION OF INFUSION DEVICE INTO SPINAL CANAL, PERCUTANEOUS APPROACH: ICD-10-PCS | Performed by: FAMILY MEDICINE

## 2018-06-29 PROCEDURE — 86900 BLOOD TYPING SEROLOGIC ABO: CPT | Performed by: FAMILY MEDICINE

## 2018-06-29 RX ORDER — ACETAMINOPHEN 325 MG/1
650 TABLET ORAL EVERY 4 HOURS PRN
Status: DISCONTINUED | OUTPATIENT
Start: 2018-06-29 | End: 2018-06-30

## 2018-06-29 RX ORDER — OXYCODONE AND ACETAMINOPHEN 5; 325 MG/1; MG/1
1 TABLET ORAL
Status: DISCONTINUED | OUTPATIENT
Start: 2018-06-29 | End: 2018-06-30

## 2018-06-29 RX ORDER — OXYTOCIN/0.9 % SODIUM CHLORIDE 30/500 ML
1-24 PLASTIC BAG, INJECTION (ML) INTRAVENOUS CONTINUOUS
Status: DISCONTINUED | OUTPATIENT
Start: 2018-06-29 | End: 2018-06-30

## 2018-06-29 RX ORDER — OXYTOCIN 10 [USP'U]/ML
10 INJECTION, SOLUTION INTRAMUSCULAR; INTRAVENOUS
Status: DISCONTINUED | OUTPATIENT
Start: 2018-06-29 | End: 2018-06-30

## 2018-06-29 RX ORDER — EPHEDRINE SULFATE 50 MG/ML
5 INJECTION, SOLUTION INTRAMUSCULAR; INTRAVENOUS; SUBCUTANEOUS
Status: DISCONTINUED | OUTPATIENT
Start: 2018-06-29 | End: 2018-06-30

## 2018-06-29 RX ORDER — LIDOCAINE HYDROCHLORIDE AND EPINEPHRINE 15; 5 MG/ML; UG/ML
INJECTION, SOLUTION EPIDURAL PRN
Status: DISCONTINUED | OUTPATIENT
Start: 2018-06-29 | End: 2018-06-30

## 2018-06-29 RX ORDER — CARBOPROST TROMETHAMINE 250 UG/ML
250 INJECTION, SOLUTION INTRAMUSCULAR
Status: DISCONTINUED | OUTPATIENT
Start: 2018-06-29 | End: 2018-06-30

## 2018-06-29 RX ORDER — LIDOCAINE 40 MG/G
CREAM TOPICAL
Status: DISCONTINUED | OUTPATIENT
Start: 2018-06-29 | End: 2018-06-30

## 2018-06-29 RX ORDER — NALBUPHINE HYDROCHLORIDE 10 MG/ML
2.5-5 INJECTION, SOLUTION INTRAMUSCULAR; INTRAVENOUS; SUBCUTANEOUS EVERY 6 HOURS PRN
Status: DISCONTINUED | OUTPATIENT
Start: 2018-06-29 | End: 2018-06-30

## 2018-06-29 RX ORDER — ONDANSETRON 2 MG/ML
4 INJECTION INTRAMUSCULAR; INTRAVENOUS EVERY 6 HOURS PRN
Status: DISCONTINUED | OUTPATIENT
Start: 2018-06-29 | End: 2018-06-30

## 2018-06-29 RX ORDER — NALOXONE HYDROCHLORIDE 0.4 MG/ML
.1-.4 INJECTION, SOLUTION INTRAMUSCULAR; INTRAVENOUS; SUBCUTANEOUS
Status: DISCONTINUED | OUTPATIENT
Start: 2018-06-29 | End: 2018-06-30

## 2018-06-29 RX ORDER — OXYTOCIN/0.9 % SODIUM CHLORIDE 30/500 ML
100-340 PLASTIC BAG, INJECTION (ML) INTRAVENOUS CONTINUOUS PRN
Status: COMPLETED | OUTPATIENT
Start: 2018-06-29 | End: 2018-06-30

## 2018-06-29 RX ORDER — FENTANYL CITRATE 50 UG/ML
50-100 INJECTION, SOLUTION INTRAMUSCULAR; INTRAVENOUS
Status: DISCONTINUED | OUTPATIENT
Start: 2018-06-29 | End: 2018-06-30

## 2018-06-29 RX ORDER — METHYLERGONOVINE MALEATE 0.2 MG/ML
200 INJECTION INTRAVENOUS
Status: DISCONTINUED | OUTPATIENT
Start: 2018-06-29 | End: 2018-06-30

## 2018-06-29 RX ORDER — BUPIVACAINE HYDROCHLORIDE 2.5 MG/ML
INJECTION, SOLUTION INFILTRATION; PERINEURAL PRN
Status: DISCONTINUED | OUTPATIENT
Start: 2018-06-29 | End: 2018-06-30

## 2018-06-29 RX ORDER — SODIUM CHLORIDE, SODIUM LACTATE, POTASSIUM CHLORIDE, CALCIUM CHLORIDE 600; 310; 30; 20 MG/100ML; MG/100ML; MG/100ML; MG/100ML
INJECTION, SOLUTION INTRAVENOUS CONTINUOUS
Status: DISCONTINUED | OUTPATIENT
Start: 2018-06-29 | End: 2018-06-30

## 2018-06-29 RX ORDER — IBUPROFEN 800 MG/1
800 TABLET, FILM COATED ORAL
Status: DISCONTINUED | OUTPATIENT
Start: 2018-06-29 | End: 2018-06-30

## 2018-06-29 RX ORDER — LIDOCAINE HYDROCHLORIDE 10 MG/ML
INJECTION, SOLUTION EPIDURAL; INFILTRATION; INTRACAUDAL; PERINEURAL
Status: COMPLETED
Start: 2018-06-29 | End: 2018-06-30

## 2018-06-29 RX ORDER — LIDOCAINE HYDROCHLORIDE 10 MG/ML
INJECTION, SOLUTION EPIDURAL; INFILTRATION; INTRACAUDAL; PERINEURAL
Status: DISCONTINUED
Start: 2018-06-29 | End: 2018-06-29 | Stop reason: HOSPADM

## 2018-06-29 RX ADMIN — SODIUM CHLORIDE, POTASSIUM CHLORIDE, SODIUM LACTATE AND CALCIUM CHLORIDE: 600; 310; 30; 20 INJECTION, SOLUTION INTRAVENOUS at 22:07

## 2018-06-29 RX ADMIN — Medication 10 ML/HR: at 18:09

## 2018-06-29 RX ADMIN — OXYTOCIN-SODIUM CHLORIDE 0.9% IV SOLN 30 UNIT/500ML 2 MILLI-UNITS/MIN: 30-0.9/5 SOLUTION at 15:35

## 2018-06-29 RX ADMIN — FENTANYL CITRATE 100 MCG: 50 INJECTION, SOLUTION INTRAMUSCULAR; INTRAVENOUS at 16:52

## 2018-06-29 RX ADMIN — Medication: at 18:08

## 2018-06-29 RX ADMIN — LIDOCAINE HYDROCHLORIDE,EPINEPHRINE BITARTRATE 3 ML: 15; .005 INJECTION, SOLUTION EPIDURAL; INFILTRATION; INTRACAUDAL; PERINEURAL at 18:01

## 2018-06-29 RX ADMIN — SODIUM CHLORIDE, POTASSIUM CHLORIDE, SODIUM LACTATE AND CALCIUM CHLORIDE: 600; 310; 30; 20 INJECTION, SOLUTION INTRAVENOUS at 18:16

## 2018-06-29 RX ADMIN — LIDOCAINE HYDROCHLORIDE 1 ML: 10 INJECTION, SOLUTION EPIDURAL; INFILTRATION; INTRACAUDAL; PERINEURAL at 13:45

## 2018-06-29 RX ADMIN — SODIUM CHLORIDE, POTASSIUM CHLORIDE, SODIUM LACTATE AND CALCIUM CHLORIDE: 600; 310; 30; 20 INJECTION, SOLUTION INTRAVENOUS at 15:35

## 2018-06-29 RX ADMIN — BUPIVACAINE HYDROCHLORIDE 10 ML: 2.5 INJECTION, SOLUTION EPIDURAL; INFILTRATION; INTRACAUDAL; PERINEURAL at 18:05

## 2018-06-29 NOTE — PROGRESS NOTES
S:  Admission  B:  Kena is a  @ 38w4d gestation, GBS neg, Hep. B neg, pregnancy uncomplicated. EFW 7# per MD  A:  Patient admitted to room 334 for ruptured membranes. Pt stated has been leaking past 4 days or so  R:  notified, labor/induction orders in. Will discuss pitocin with pt

## 2018-06-29 NOTE — PROGRESS NOTES
S: Triage Arrival  B: Kena is a 23 y.o.  @ 38w 4d who presents with complaint of no fetal movement  A: EFM applied. FHT's145 with moderate variability, accels present, no decels noted on strip. Contractions occaqs  R: Will notify MD to obtain further orders.

## 2018-06-29 NOTE — ANESTHESIA PROCEDURE NOTES
Peripheral nerve/Neuraxial procedure note : epidural catheter  Pre-Procedure  Performed by  KAVITA MARTINS   Location: OB      Pre-Anesthestic Checklist: patient identified, IV checked, risks and benefits discussed, informed consent, monitors and equipment checked, pre-op evaluation and at physician/surgeon's request    Timeout  Correct Patient: Yes   Correct Procedure: Yes   Correct Site: Yes   Correct Laterality: N/A   Correct Position: Yes   Site Marked: N/A   .   Procedure Documentation    .    Procedure:    Epidural catheter.  Insertion Site:L3-4  (midline approach) Injection technique: LORT air   Local skin infiltrated with 3 mL of 1% lidocaine.  CHARY at 9 cm     Patient Prep;mask, sterile gloves, povidone-iodine 7.5% surgical scrub, patient draped.  .  Needle: Touhy needle, Gee Needle Gauge: 18.    Needle Length (Inches) 3.5  # of attempts: 1 and # of redirects:  .   Catheter: 20 G . .  Catheter threaded easily  4 cm epidural space.  .   .    Assessment/Narrative  Paresthesias: No.  .  .  Aspiration negative for heme or CSF  . Test dose of 3 mL lidocaine 1.5% w/ 1:200,000 epinephrine at 18:01.  Test dose negative for signs of intravascular, subdural or intrathecal injection. Sensory Level Left: T8  Sensory Level Right: T8  Comments:  Pt. Sitting on the edge of the bed tolerating procedure well.  No pain following bolus of medication.  Educated on use of PCEA.

## 2018-06-29 NOTE — ANESTHESIA PREPROCEDURE EVALUATION
Anesthesia Evaluation     .             ROS/MED HX    ENT/Pulmonary:     (+)asthma , . .    Neurologic:  - neg neurologic ROS     Cardiovascular:  - neg cardiovascular ROS       METS/Exercise Tolerance:     Hematologic:         Musculoskeletal:         GI/Hepatic:  - neg GI/hepatic ROS       Renal/Genitourinary:         Endo:         Psychiatric:         Infectious Disease:         Malignancy:         Other:                     Physical Exam  Normal systems: cardiovascular, pulmonary and dental    Airway   Mallampati: I  TM distance: > 3 FB  Neck ROM: full  Mouth opening: > 3 cm    Dental     Cardiovascular       Pulmonary           neg OB ROS                 Anesthesia Plan      History & Physical Review      ASA Status:  2 .  OB Epidural Asa: 2       Plan for Epidural          Postoperative Care      Consents  Anesthetic plan, risks, benefits and alternatives discussed with:  Patient..                          .

## 2018-06-29 NOTE — MR AVS SNAPSHOT
After Visit Summary   6/29/2018    Kena Sorto    MRN: [id number]           Patient Information     Date Of Birth          1995        Visit Information        Provider Department      6/29/2018 10:41 AM Darcy Duarte PA-C [facility]        Today's Diagnoses     Complication of pregnancy, antepartum, unspecified trimester    -  1       Follow-ups after your visit        Your next 10 appointments already scheduled     Jul 02, 2018 10:30 AM CDT   ESTABLISHED PRENATAL with Darcy Duarte PA-C   [facility]    [address] [place] [id number] [phone]            Jul 09, 2018 11:00 AM CDT   ESTABLISHED PRENATAL with Ruddy Hart MD   [facility] ([address] [phone]              Future tests that were ordered for you today     Open Standing Orders        Priority Remaining Interval Expires Ordered    UA with Microscopic reflex to Culture STAT 1/1 CONDITIONAL X 1 STAT  6/29/2018            Who to contact     If you have questions or need follow up information about today's clinic visit or your schedule please contact [facility] directly at [phone].  Normal or non-critical lab and imaging results will be communicated to you by MyChart, letter or phone within 4 business days after the clinic has received the results. If you do not hear from us within 7 days, please contact the clinic through [employer] or phone. If you have a critical or abnormal lab result, we will notify you by phone as soon as possible.  Submit refill requests through [employer] or call your pharmacy and they will forward the refill request to us. Please allow 3 business days for your refill to be completed.          Additional Information About Your Visit        [employer] Information     [employer] gives you secure access to your electronic  health record. If you see a primary care provider, you can also send messages to your care team and make appointments. If you have questions, please call your primary care clinic.  If you do not have a primary care provider, please call [phone] and they will assist you.        Care EveryWhere ID     This is your Care EveryWhere ID. This could be used by other organizations to access your [place] medical records  [id number]        Your Vitals Were     Last Period                   10/02/2017            Blood Pressure from Last 3 Encounters:   06/29/18 121/58   06/25/18 120/78   06/18/18 120/72    Weight from Last 3 Encounters:   06/25/18 243 lb (110.2 kg)   06/18/18 241 lb (109.3 kg)   06/13/18 238 lb (108 kg)              Today, you had the following     No orders found for display         Today's Medication Changes      Notice     This visit is during an admission. Changes to the med list made in this visit will be reflected in the After Visit Summary of the admission.             Primary Care Provider Office Phone # Fax #    Darcy Duarte PA-C [phone] [address] [place] [address]        Equal Access to Services     JOAQUINA MOORE : Hadii ann-marie ku hadasho Soomaali, waaxda luqadaha, qaybta kaalmada adeegyada, nino gomez. So [facility] [phone].    ATENCIÓN: Si habla español, tiene a bernal disposición servicios gratuitos de asistencia lingüística. Jarredame al [phone].    We comply with applicable federal civil rights laws and [place] laws. We do not discriminate on the basis of race, color, national origin, age, disability, sex, sexual orientation, or gender identity.            Thank you!     Thank you for choosing [facility]  for your care. Our goal is always to provide you with excellent care. Hearing back from our patients is one way we can continue to improve our services. Please take a few minutes to complete the written  survey that you may receive in the mail after your visit with us. Thank you!             Your Updated Medication List - Protect others around you: Learn how to safely use, store and throw away your medicines at www.disposemymeds.org.      Notice     This visit is during an admission. Changes to the med list made in this visit will be reflected in the After Visit Summary of the admission.

## 2018-06-29 NOTE — H&P
[facility] Labor and Delivery History and Physical    Kena Sorto MRN# [id number]   Age: 23 year old YOB: 1995     Date of Admission:  2018    Primary care provider: Darcy Duarte           Chief Complaint:   Kena Sorto is a 23 year old female who is 38w4d pregnant and being admitted for PROM. Claimed to have increased discharge since being checked in clinc 5 days ago.     Prenatal record uncomplicated          Pregnancy history:     OBSTETRIC HISTORY:    Obstetric History       T0      L0     SAB0   TAB0   Ectopic0   Multiple0   Live Births0       # Outcome Date GA Lbr Nik/2nd Weight Sex Delivery Anes PTL Lv   1 Current               Obstetric Comments   EDC 2018 based on LMP.  Parenting with Vaughn.       EDC: Estimated Date of Delivery: 2018    Prenatal Labs: Lab Results   Component Value Date    ABO O 2018    RH Pos 2018    CHPCRT  2017     Negative   Negative for C. trachomatis rRNA by transcription mediated amplification.   A negative result by transcription mediated amplification does not preclude the   presence of C. trachomatis infection because results are dependent on proper   and adequate collection, absence of inhibitors, and sufficient rRNA to be   detected.      GCPCRT  2016     Negative  Negative for N. gonorrhoeae rRNA by transcription mediated amplification.   A negative result by transcription mediated amplification does not preclude the   presence of N. gonorrhoeae infection because results are dependent on proper   and adequate collection, absence of inhibitors, and sufficient rRNA to be   detected.      TREPAB Negative 2018    HGB 12.6 2018    HIV Negative 2012       GBS Status:   Lab Results   Component Value Date    GBS Negative 2018       Active Problem List  Patient Active Problem List   Diagnosis     Attention deficit disorder     Generalized anxiety disorder     Chronic  adenoiditis     Hypertrophy of nasal turbinates     Obesity     Geographic tongue     Acute reaction to stress     Major depressive disorder, recurrent episode, mild (H)     Mild persistent asthma without complication     Seasonal allergic rhinitis     Constipation, unspecified constipation type     Encounter for supervision of normal first pregnancy, unspecified trimester     Encounter for triage in pregnant patient     Amniotic fluid leaking       Medication Prior to Admission  Prescriptions Prior to Admission   Medication Sig Dispense Refill Last Dose     budesonide-formoterol (SYMBICORT) 160-4.5 MCG/ACT Inhaler Inhale 2 puffs into the lungs 2 times daily 3 Inhaler 3 6/29/2018 at 0800     cetirizine (ZYRTEC ALLERGY) 10 MG tablet Take 1 tablet (10 mg) by mouth daily 90 tablet 3 6/28/2018 at 2230     hydrOXYzine (ATARAX) 25 MG tablet TAKE ONE TO TWO TABLETS BY MOUTH EVERY 4 HOURS AS NEEDED FOR ITCHING 90 tablet 1 6/28/2018 at 2230     mometasone (NASONEX) 50 MCG/ACT spray Spray 2 sprays into both nostrils daily 3 Box 3 6/29/2018 at 0800     Prenatal Vit-Fe Fumarate-FA (PRENATAL MULTIVITAMIN PLUS IRON) 27-0.8 MG TABS per tablet Take 1 tablet by mouth daily   6/29/2018 at 0800     albuterol (PROAIR HFA/PROVENTIL HFA/VENTOLIN HFA) 108 (90 BASE) MCG/ACT Inhaler Inhale 2 puffs into the lungs every 6 hours as needed for shortness of breath / dyspnea or wheezing 2 Inhaler 3 More than a month at Unknown time     polyethylene glycol (MIRALAX) powder Take 17 g (1 capful) by mouth daily (Patient not taking: Reported on 1/8/2018) 510 g 1 Unknown at Unknown time   .        Maternal Past Medical History:     Past Medical History:   Diagnosis Date     Chronic adenoiditis 3/3/2010     Hypertrophy of nasal turbinates 3/3/2010     Iron deficiency anemia, unspecified     TREATED WITH IRON SUPPLEMENTS     Mild intermittent asthma      Plantar fasciitis 3/15/2012     Pneumonia, organism unspecified(486)     Pneumonia     Seasonal  allergies                        Family History:   I have reviewed this patient's family history and commented on sigificant items within the HPI            Social History:   I have reviewed this patient's social history and commented on significant items within the HPI         Review of Systems:   The Review of Systems is negative other than noted in the HPI          Physical Exam:   Temp: 98.2  F (36.8  C) Temp src: Oral BP: 154/88 Pulse: 90   Resp: 18 SpO2: 99 %      Patient Vitals for the past 8 hrs:   BP Temp Temp src Pulse Resp SpO2   18 1734 - - - - - 99 %   18 1700 154/88 - - 90 18 -   18 1629 131/77 - - - - -   18 1559 (!) 145/94 - - -  -   18 1530 139/85 - - 84 18 -   18 1508 143/83 98.2  F (36.8  C) Oral 82 18 -   18 1141 121/58 97.5  F (36.4  C) Oral - - -       gen - well appearing  CV - RRR  Lungs: CTAB  Abd- gravid, non tender    Extremities:   Warm, well perfused  Edema absent     Cervix:   Membranes: clear amniotic fluid   Dilation: 3   vertex  Presentation:Cephalic  Fetal Heart Rate Tracing: Tier 1 (normal)  Tocometer: IUPC     EFW: 7 lb                 Assessment:   Kena Sorto is a 38w4d pregnant female admitted with PROM. IOL pitocin. Uncertain timeframe.        Plan:   Admit - see IP orders  Anticipate   Labor induction with Pitocin    Lance Tang MD

## 2018-06-29 NOTE — IP AVS SNAPSHOT
MRN:[id number]                      After Visit Summary   6/29/2018    Kena Sorto    MRN: [id number]           Thank you!     Thank you for choosing [facility] for your care. Our goal is always to provide you with excellent care. Hearing back from our patients is one way we can continue to improve our services. Please take a few minutes to complete the written survey that you may receive in the mail after you visit with us. Thank you!        Patient Information     Date Of Birth          1995        About your hospital stay     You were admitted on:  June 29, 2018 You last received care in the:  [facility]    You were discharged on:  July 2, 2018       Who to Call     For medical emergencies, please call 911.  For non-urgent questions about your medical care, please call your primary care provider or clinic, [phone]          Attending Provider     Provider Specialty    Lance Tang MD Family Practice       Primary Care Provider Office Phone # Fax #    Darcy Duarte PA-C [phone]      Your next 10 appointments already scheduled     Jul 02, 2018 10:30 AM CDT   [facility]    [address] [place] [address] [id number] [phone]            Jul 09, 2018 11:00 AM CDT   [facility]    [address] [place] [address] [id number] [phone]              Further instructions from your care team       Postpartum Vaginal Delivery Instructions    Activity       Ask family and friends for help when you need it.    Do not place anything in your vagina for 6 weeks.    You are not restricted on other activities, but take it easy for a few weeks to allow your body to recover from delivery.  You are able to do any activities you feel up to that point.    No  driving until you have stopped taking your pain medications (usually two weeks after delivery).     Call your health care provider if you have any of these symptoms:       Increased pain, swelling, redness, or fluid around your stiches from an episiotomy or perineal tear.    A fever above 100.4 F (38 C) with or without chills when placing a thermometer under your tongue.    You soak a sanitary pad with blood within 1 hour, or you see blood clots larger than a golf ball.    Bleeding that lasts more than 6 weeks.    Vaginal discharge that smells bad.    Severe pain, cramping or tenderness in your lower belly area.    A need to urinate more frequently (use the toilet more often), more urgently (use the toilet very quickly), or it burns when you urinate.    Nausea and vomiting.    Redness, swelling or pain around a vein in your leg.    Problems breastfeeding or a red or painful area on your breast.    Chest pain and cough or are gasping for air.    Problems coping with sadness, anxiety, or depression.  If you have any concerns about hurting yourself or the baby, call your provider immediately.     You have questions or concerns after you return home.     Keep your hands clean:  Always wash your hands before touching your perineal area and stitches.  This helps reduce your risk of infection.  If your hands aren't dirty, you may use an alcohol hand-rub to clean your hands. Keep your nails clean and short.        Pending Results     No orders found from 6/27/2018 to 6/30/2018.            Statement of Approval     Ordered          07/02/18 0846  I have reviewed and agree with all the recommendations and orders detailed in this document.  EFFECTIVE NOW     Approved and electronically signed by:  Lance Tang MD             Admission Information     Date & Time Provider Department Dept. Phone    6/29/2018 Lance Tang MD [facility] [phone]      Your Vitals Were     Blood Pressure  Pulse Temperature Respirations Last Period Pulse Oximetry    136/72 86 97.9  F (36.6  C) (Oral) 16 10/02/2017 98%      [employer] Information     [employer] gives you secure access to your electronic health record. If you see a primary care provider, you can also send messages to your care team and make appointments. If you have questions, please call your primary care clinic.  If you do not have a primary care provider, please call [phone] and they will assist you.        Care EveryWhere ID     This is your Care EveryWhere ID. This could be used by other organizations to access your [facility] medical records  [id number]        Equal Access to Services     [facility] Hospital[facility] : Ac Johnson, pat son, lesa wood, nino doll . So [facility] [phone].    ATENCIÓN: Si habla español, tiene a bernal disposición servicios gratuitos de asistencia lingüística. Llame al [phone].    We comply with applicable federal civil rights laws and [place] laws. We do not discriminate on the basis of race, color, national origin, age, disability, sex, sexual orientation, or gender identity.               Review of your medicines      CONTINUE these medicines which have NOT CHANGED        Dose / Directions    albuterol 108 (90 Base) MCG/ACT Inhaler   Commonly known as:  PROAIR HFA/PROVENTIL HFA/VENTOLIN HFA   Used for:  Mild persistent asthma without complication        Dose:  2 puff   Inhale 2 puffs into the lungs every 6 hours as needed for shortness of breath / dyspnea or wheezing   Quantity:  2 Inhaler   Refills:  3       budesonide-formoterol 160-4.5 MCG/ACT Inhaler   Commonly known as:  SYMBICORT   Used for:  Mild persistent asthma without complication        Dose:  2 puff   Inhale 2 puffs into the lungs 2 times daily   Quantity:  3 Inhaler   Refills:  3       cetirizine 10 MG tablet   Commonly known as:  ZYRTEC ALLERGY   Used for:  Seasonal allergic rhinitis,  unspecified chronicity, unspecified trigger        Dose:  10 mg   Take 1 tablet (10 mg) by mouth daily   Quantity:  90 tablet   Refills:  3       hydrOXYzine 25 MG tablet   Commonly known as:  ATARAX   Used for:  Rash and nonspecific skin eruption        TAKE ONE TO TWO TABLETS BY MOUTH EVERY 4 HOURS AS NEEDED FOR ITCHING   Quantity:  90 tablet   Refills:  1       mometasone 50 MCG/ACT spray   Commonly known as:  NASONEX   Used for:  Seasonal allergic rhinitis, unspecified chronicity, unspecified trigger        Dose:  2 spray   Spray 2 sprays into both nostrils daily   Quantity:  3 Box   Refills:  3       prenatal multivitamin plus iron 27-0.8 MG Tabs per tablet        Dose:  1 tablet   Take 1 tablet by mouth daily   Refills:  0         STOP taking     polyethylene glycol powder   Commonly known as:  MIRALAX                    Protect others around you: Learn how to safely use, store and throw away your medicines at www.disposemymeds.org.             Medication List: This is a list of all your medications and when to take them. Check marks below indicate your daily home schedule. Keep this list as a reference.      Medications           Morning Afternoon Evening Bedtime As Needed    albuterol 108 (90 Base) MCG/ACT Inhaler   Commonly known as:  PROAIR HFA/PROVENTIL HFA/VENTOLIN HFA   Inhale 2 puffs into the lungs every 6 hours as needed for shortness of breath / dyspnea or wheezing                                budesonide-formoterol 160-4.5 MCG/ACT Inhaler   Commonly known as:  SYMBICORT   Inhale 2 puffs into the lungs 2 times daily                                cetirizine 10 MG tablet   Commonly known as:  ZYRTEC ALLERGY   Take 1 tablet (10 mg) by mouth daily   Last time this was given:  10 mg on 7/1/2018  8:38 PM                                hydrOXYzine 25 MG tablet   Commonly known as:  ATARAX   TAKE ONE TO TWO TABLETS BY MOUTH EVERY 4 HOURS AS NEEDED FOR ITCHING   Last time this was given:  50 mg on 6/30/2018  12:07 PM                                mometasone 50 MCG/ACT spray   Commonly known as:  NASONEX   Spray 2 sprays into both nostrils daily                                prenatal multivitamin plus iron 27-0.8 MG Tabs per tablet   Take 1 tablet by mouth daily

## 2018-06-29 NOTE — IP AVS SNAPSHOT
[address]     [place] [address]    Phone:  [phone]                                       After Visit Summary   6/29/2018    Kena Sorto    MRN: [id number]           After Visit Summary Signature Page     I have received my discharge instructions, and my questions have been answered. I have discussed any challenges I see with this plan with the nurse or doctor.    ..........................................................................................................................................  Patient/Patient Representative Signature      ..........................................................................................................................................  Patient Representative Print Name and Relationship to Patient    ..................................................               ................................................  Date                                            Time    ..........................................................................................................................................  Reviewed by Signature/Title    ...................................................              ..............................................  Date                                                            Time

## 2018-06-30 LAB — T PALLIDUM AB SER QL: NONREACTIVE

## 2018-06-30 PROCEDURE — 0UQMXZZ REPAIR VULVA, EXTERNAL APPROACH: ICD-10-PCS | Performed by: FAMILY MEDICINE

## 2018-06-30 PROCEDURE — 12000031 ZZH R&B OB CRITICAL

## 2018-06-30 PROCEDURE — 25000125 ZZHC RX 250

## 2018-06-30 PROCEDURE — 72200001 ZZH LABOR CARE VAGINAL DELIVERY SINGLE

## 2018-06-30 PROCEDURE — 25000125 ZZHC RX 250: Performed by: FAMILY MEDICINE

## 2018-06-30 PROCEDURE — 25000128 H RX IP 250 OP 636: Performed by: FAMILY MEDICINE

## 2018-06-30 PROCEDURE — 59400 OBSTETRICAL CARE: CPT | Performed by: FAMILY MEDICINE

## 2018-06-30 PROCEDURE — 25000132 ZZH RX MED GY IP 250 OP 250 PS 637: Performed by: FAMILY MEDICINE

## 2018-06-30 RX ORDER — CETIRIZINE HYDROCHLORIDE 10 MG/1
10 TABLET ORAL EVERY EVENING
Status: DISCONTINUED | OUTPATIENT
Start: 2018-06-30 | End: 2018-07-02 | Stop reason: HOSPADM

## 2018-06-30 RX ORDER — AMOXICILLIN 250 MG
1 CAPSULE ORAL 2 TIMES DAILY
Status: DISCONTINUED | OUTPATIENT
Start: 2018-06-30 | End: 2018-07-02 | Stop reason: HOSPADM

## 2018-06-30 RX ORDER — OXYTOCIN/0.9 % SODIUM CHLORIDE 30/500 ML
100 PLASTIC BAG, INJECTION (ML) INTRAVENOUS CONTINUOUS
Status: DISCONTINUED | OUTPATIENT
Start: 2018-06-30 | End: 2018-07-02 | Stop reason: HOSPADM

## 2018-06-30 RX ORDER — ALBUTEROL SULFATE 90 UG/1
2 AEROSOL, METERED RESPIRATORY (INHALATION) EVERY 6 HOURS PRN
Status: DISCONTINUED | OUTPATIENT
Start: 2018-06-30 | End: 2018-07-02 | Stop reason: HOSPADM

## 2018-06-30 RX ORDER — FLUTICASONE PROPIONATE 50 MCG
2 SPRAY, SUSPENSION (ML) NASAL DAILY
Status: DISCONTINUED | OUTPATIENT
Start: 2018-06-30 | End: 2018-07-02 | Stop reason: HOSPADM

## 2018-06-30 RX ORDER — ACETAMINOPHEN 325 MG/1
650 TABLET ORAL EVERY 4 HOURS PRN
Status: DISCONTINUED | OUTPATIENT
Start: 2018-06-30 | End: 2018-07-02 | Stop reason: HOSPADM

## 2018-06-30 RX ORDER — OXYTOCIN/0.9 % SODIUM CHLORIDE 30/500 ML
340 PLASTIC BAG, INJECTION (ML) INTRAVENOUS CONTINUOUS PRN
Status: DISCONTINUED | OUTPATIENT
Start: 2018-06-30 | End: 2018-06-30

## 2018-06-30 RX ORDER — NALOXONE HYDROCHLORIDE 0.4 MG/ML
.1-.4 INJECTION, SOLUTION INTRAMUSCULAR; INTRAVENOUS; SUBCUTANEOUS
Status: DISCONTINUED | OUTPATIENT
Start: 2018-06-30 | End: 2018-07-02 | Stop reason: HOSPADM

## 2018-06-30 RX ORDER — HYDROXYZINE HYDROCHLORIDE 50 MG/1
50 TABLET, FILM COATED ORAL EVERY 4 HOURS PRN
Status: DISCONTINUED | OUTPATIENT
Start: 2018-06-30 | End: 2018-07-01

## 2018-06-30 RX ORDER — OXYCODONE HYDROCHLORIDE 5 MG/1
5 TABLET ORAL EVERY 4 HOURS PRN
Status: DISCONTINUED | OUTPATIENT
Start: 2018-06-30 | End: 2018-07-02 | Stop reason: HOSPADM

## 2018-06-30 RX ORDER — AMOXICILLIN 250 MG
2 CAPSULE ORAL 2 TIMES DAILY
Status: DISCONTINUED | OUTPATIENT
Start: 2018-06-30 | End: 2018-07-02 | Stop reason: HOSPADM

## 2018-06-30 RX ORDER — HYDROCORTISONE 2.5 %
CREAM (GRAM) TOPICAL 3 TIMES DAILY PRN
Status: DISCONTINUED | OUTPATIENT
Start: 2018-06-30 | End: 2018-07-02 | Stop reason: HOSPADM

## 2018-06-30 RX ORDER — LANOLIN 100 %
OINTMENT (GRAM) TOPICAL
Status: DISCONTINUED | OUTPATIENT
Start: 2018-06-30 | End: 2018-07-02 | Stop reason: HOSPADM

## 2018-06-30 RX ORDER — HYDROXYZINE HYDROCHLORIDE 25 MG/1
25 TABLET, FILM COATED ORAL EVERY 4 HOURS PRN
Status: DISCONTINUED | OUTPATIENT
Start: 2018-06-30 | End: 2018-07-02 | Stop reason: HOSPADM

## 2018-06-30 RX ORDER — BISACODYL 10 MG
10 SUPPOSITORY, RECTAL RECTAL DAILY PRN
Status: DISCONTINUED | OUTPATIENT
Start: 2018-07-02 | End: 2018-07-02 | Stop reason: HOSPADM

## 2018-06-30 RX ORDER — OXYTOCIN 10 [USP'U]/ML
10 INJECTION, SOLUTION INTRAMUSCULAR; INTRAVENOUS
Status: DISCONTINUED | OUTPATIENT
Start: 2018-06-30 | End: 2018-06-30

## 2018-06-30 RX ORDER — IBUPROFEN 800 MG/1
800 TABLET, FILM COATED ORAL EVERY 6 HOURS PRN
Status: DISCONTINUED | OUTPATIENT
Start: 2018-06-30 | End: 2018-07-02 | Stop reason: HOSPADM

## 2018-06-30 RX ORDER — MISOPROSTOL 200 UG/1
400 TABLET ORAL
Status: DISCONTINUED | OUTPATIENT
Start: 2018-06-30 | End: 2018-06-30

## 2018-06-30 RX ADMIN — IBUPROFEN 800 MG: 800 TABLET ORAL at 14:22

## 2018-06-30 RX ADMIN — IBUPROFEN 800 MG: 800 TABLET ORAL at 02:33

## 2018-06-30 RX ADMIN — SODIUM CHLORIDE, POTASSIUM CHLORIDE, SODIUM LACTATE AND CALCIUM CHLORIDE: 600; 310; 30; 20 INJECTION, SOLUTION INTRAVENOUS at 01:03

## 2018-06-30 RX ADMIN — IBUPROFEN 800 MG: 800 TABLET ORAL at 20:25

## 2018-06-30 RX ADMIN — CETIRIZINE HYDROCHLORIDE 10 MG: 10 TABLET, FILM COATED ORAL at 21:05

## 2018-06-30 RX ADMIN — ACETAMINOPHEN 650 MG: 325 TABLET ORAL at 18:00

## 2018-06-30 RX ADMIN — IBUPROFEN 800 MG: 800 TABLET ORAL at 08:14

## 2018-06-30 RX ADMIN — OXYCODONE HYDROCHLORIDE 5 MG: 5 TABLET ORAL at 18:00

## 2018-06-30 RX ADMIN — ACETAMINOPHEN 650 MG: 325 TABLET ORAL at 21:56

## 2018-06-30 RX ADMIN — OXYTOCIN-SODIUM CHLORIDE 0.9% IV SOLN 30 UNIT/500ML 340 ML/HR: 30-0.9/5 SOLUTION at 01:36

## 2018-06-30 RX ADMIN — FLUTICASONE FUROATE AND VILANTEROL TRIFENATATE 1 PUFF: 200; 25 POWDER RESPIRATORY (INHALATION) at 08:32

## 2018-06-30 RX ADMIN — ACETAMINOPHEN 650 MG: 325 TABLET ORAL at 14:22

## 2018-06-30 RX ADMIN — DIBUCAINE: 1 OINTMENT TOPICAL at 10:46

## 2018-06-30 RX ADMIN — HYDROXYZINE HYDROCHLORIDE 50 MG: 50 TABLET, FILM COATED ORAL at 12:07

## 2018-06-30 RX ADMIN — FLUTICASONE PROPIONATE 2 SPRAY: 50 SPRAY, METERED NASAL at 08:32

## 2018-06-30 RX ADMIN — SENNOSIDES AND DOCUSATE SODIUM 1 TABLET: 8.6; 5 TABLET ORAL at 08:31

## 2018-06-30 RX ADMIN — SENNOSIDES AND DOCUSATE SODIUM 2 TABLET: 8.6; 5 TABLET ORAL at 20:24

## 2018-06-30 RX ADMIN — LIDOCAINE HYDROCHLORIDE 300 MG: 10 INJECTION, SOLUTION EPIDURAL; INFILTRATION; INTRACAUDAL; PERINEURAL at 01:36

## 2018-06-30 RX ADMIN — OXYCODONE HYDROCHLORIDE 5 MG: 5 TABLET ORAL at 21:05

## 2018-06-30 RX ADMIN — OXYCODONE HYDROCHLORIDE 5 MG: 5 TABLET ORAL at 12:48

## 2018-06-30 NOTE — PROGRESS NOTES
S: Delivery  B: augmented Labor,  @ [id number] gestation, GBS negative.  A: Patient delivered Vaginal at 0131 with Dr. Tang in attendance. Baby delivered and placed on mother's low abdomen for delayed cord clamping where baby was dried and stimulated. After cord clamped and cut, baby was placed skin to skin on mother's chest within 5 minutes following delivery . Apgars 8/9. Placenta was delivered @ 0134 followed by administration of oxytocin IV. Long 2nd degree labial tear with repair. Patient is very tender and swollen. Bleeding controlled. Bonding initiated with mom and baby. Educated mother on breast feeding, but she has chosen to formula feed.  Mother given bottle feeding instructions, See flowsheet for VS and PP checks. Labor care plan goals met.  R: Expect routine postpartum care. 1st formula feed given @ 0230.

## 2018-06-30 NOTE — PROGRESS NOTES
S: Transfer to postpartum  B: Vaginal birth @ 0131, long 2nd degree tear, with repair, formula feeding  per mother's request.   A: Mother and baby transferred to postpartum unit at 0415 via ambulatory after completion of immediate recovery period. Patient oriented to room. Mother and baby bonding well and in satisfactory condition upon transfer. Lowe catheter removed immediately prior to delivery and patient has not voided or felt urge to void yet.  R: Anticipate routine postpartum care and monitor pain control. Ice packs, Dermoplast and Tucks for perineal pain. Also encouraged tub baths. Ensure voiding spontaneously by 9am. Explained to patient.

## 2018-06-30 NOTE — PROGRESS NOTES
Spoke with DR. Tang he advised patient to labor down until she was +1 to +2 patient is ok with this.

## 2018-06-30 NOTE — PLAN OF CARE
Problem: Labor (Cervical Ripen, Induct, Augment) (Adult,Obstetrics,Pediatric)  Goal: Signs and Symptoms of Listed Potential Problems Will be Absent, Minimized or Managed (Labor)  Signs and symptoms of listed potential problems will be absent, minimized or managed by discharge/transition of care (reference Labor (Cervical Ripen, Induct, Augment) (Adult,Obstetrics,Pediatric) CPG).  Patient C/O being jittery and contractions being close together. Cervical exam performed; 6cm, 90%, -1, Pitocin reduced to 4 to 2 mu

## 2018-06-30 NOTE — PROGRESS NOTES
No complaints. Doing well. Complete now for 2 hrs and labored down. At 0 station and ready to push. First pushes not entirely effective. Nursing to keep working with her. OA position. EFW 7.5 lbs. FHT baseline up to 150-160 now. No maternal fever. No decels. Mod variability. Cat 2 with good progress. Ansvd.     rh

## 2018-06-30 NOTE — L&D DELIVERY NOTE
"Delivery Summary    Kena Sorto MRN# [id number]   Age: 23 year old YOB: 1995     ASSESSMENT & PLAN: admitted with prom. iol with pit, uneventful.  intact. Placenta intact. Long L labial tear that went posterior. Fundus firm. Mom baby together and well.         Labor Event Times    Labor onset date:  18    Dilation complete date:  18 Complete time:   9:40 PM   Start pushing date/time:  2018 2354            Labor Length    2nd Stage (hrs):  3 (min):  51      Labor Events     labor?:  No    steroids:  None   Labor Type:  Spontaneous, Augmentation   Predominate monitoring during 1st stage:  continuous electronic fetal monitoring      Antibiotics received during labor?:  No      Rupture identifier:  Rupture 1   Rupture date/time:     Rupture type:  Spontaneous rupture of membranes occuring during spontaneous labor or augmentation   Fluid color:  Clear   Fluid odor:  Normal      Augmentation:  Oxytocin   Indications for augmentation:  Ineffective Contraction Pattern         Delivery/Placenta Date and Time    Delivery Date:  18 Delivery Time:   1:31 AM      Apgars    Living status:  Living    1 Minute 5 Minute 10 Minute 15 Minute 20 Minute   Skin color: 1  1       Heart rate: 2  2       Reflex irritability: 2  2       Muscle tone: 1  2       Respiratory effort: 2  2       Total: 8  9          Apgars assigned by:  BEVERLEY ZAPIEN RN      Cord    Vessels:  3 Vessels Complications:  Nuchal   Cord Blood Disposition:  Lab Gases Sent?:  No         [place] Measurements    Weight:  7 lb 12.5 oz Length:  1' 8\"   Head circumference:  36.8 cm Chest circumference:  34.9 cm      Labor Events and Shoulder Dystocia    Fetal Tracing Prior to Delivery:  Category 2            Delivery (Maternal) (Provider to Complete) (517261)    Episiotomy:  None   Labial laceration:  left Repaired?:  Yes   Est. blood loss (mL):  200         Mother's Information  Mother: Kena Sorto #[id number]    Start " of Mother's Information     IO Blood Loss  06/29/18 0000 - 06/30/18 0226    Mom's I/O Activity            End of Mother's Information  Mother: Kena Sorto #[id number]            Delivery - Provider to Complete ([id number])    Delivering clinician:  MIHAELA ROSARIO   Attempted Delivery Types (Choose all that apply):  Spontaneous Vaginal Delivery   Delivery Type (Choose the 1 that will go to the Birth History):  Vaginal, Spontaneous Delivery                           Placenta    Delayed Cord Clamping:  Done   Removal:  Spontaneous   Disposition:  Hospital disposal      Presentation and Position    Position:  Right Occiput Anterior                    Mihaela Rosario MD

## 2018-06-30 NOTE — PROGRESS NOTES
[facility]    Obstetrics Daily Post-Op Note    Assessment & Plan      -* No surgery found *    Doing well.    Plan:  -Ambulate  -Advance activity as tolerated  -Continue supportive and symptomatic treatment  -Pain control measures  -Advance diet as tolerated  -Routine wound care  -     Active Problems:    Encounter for triage in pregnant patient    Amniotic fluid leaking     (normal spontaneous vaginal delivery)      Lance Tang    Subjective:  Interval History   Stable.  Doing well.  Improving slowly.  Pain is reasonably controlled.  No fevers. Lochia as expected for this stage.       Exam:  Physical Exam   Vitals:    18 0330 18 0345 18 0530 18 0815   BP: 131/72 129/67 131/74 126/71   Pulse: 93 96 86 86   Resp:   16 16   Temp:   98.4  F (36.9  C) 95  F (35  C)   TempSrc:   Oral Oral   SpO2:    98%       Constitutional: healthy, alert and no distress  Abdomen:  Uterine fundus is firm, expected tenderness at the level of the umbilicus, incision:   Extremeties:  No edema, pulses intact, scd's in place      Medications     - MEDICATION INSTRUCTIONS -       NO Rho (D) immune globulin (RhoGam) needed - mother Rh POSITIVE       - MEDICATION INSTRUCTIONS -       oxytocin in 0.9% NaCl          cetirizine  10 mg Oral QPM     fluticasone  2 spray Both Nostrils Daily     fluticasone-vilanterol  1 puff Inhalation Daily     senna-docusate  1 tablet Oral BID    Or     senna-docusate  2 tablet Oral BID       Data   Hemoglobin   Date Value Ref Range Status   2018 12.6 11.7 - 15.7 g/dL Final   2018 13.3 11.7 - 15.7 g/dL Final     No results found for: RUBELLAABIGG   Lab Results   Component Value Date    ABO O 2018           Lab Results   Component Value Date    RH Pos 2018      Lab Results   Component Value Date    GLC 82 10/31/2001         Lance Tang  Pg :[phone]

## 2018-06-30 NOTE — ANESTHESIA CARE TRANSFER NOTE
Patient: Kena Sorto    * No procedures listed *    Diagnosis: * No pre-op diagnosis entered *  Diagnosis Additional Information: No value filed.    Anesthesia Type:   Epidural     Note:  Airway :Room Air  Patient transferred to:Labor and Delivery  Handoff Report: Identifed the Patient, Identified the Reponsible Provider, Reviewed the pertinent medical history, Discussed the surgical course, Reviewed Intra-OP anesthesia mangement and issues during anesthesia, Set expectations for post-procedure period and Allowed opportunity for questions and acknowledgement of understanding      Vitals: (Last set prior to Anesthesia Care Transfer)              Electronically Signed By: HILDA Mendoza CRNA  June 30, 2018  1:54 PM

## 2018-06-30 NOTE — PROGRESS NOTES
Updated Dr. Tagn with latest cervical exam and fetal station. No significant change from previous. Fetal baseline has elevated over time and is now 160-165 bpm. Patient is to start pushing. Dr. Tang will come in to attend delivery.  Room setup for delivery, explained to patient and S.O.

## 2018-06-30 NOTE — ANESTHESIA POSTPROCEDURE EVALUATION
Patient: Kena Sorto    * No procedures listed *    Diagnosis:* No pre-op diagnosis entered *  Diagnosis Additional Information: No value filed.    Anesthesia Type:  Epidural    Note:  Anesthesia Post Evaluation    Patient location during evaluation: Floor  Patient participation: Able to fully participate in evaluation  Level of consciousness: awake and alert  Pain management: adequate  Airway patency: patent  Cardiovascular status: acceptable  Respiratory status: acceptable  Hydration status: acceptable  PONV: none     Anesthetic complications: None          Last vitals:  Vitals:    06/30/18 0530 06/30/18 0815 06/30/18 1145   BP: 131/74 126/71 130/71   Pulse: 86 86 78   Resp: 16 16 16   Temp: 98.4  F (36.9  C) 95  F (35  C) 98.2  F (36.8  C)   SpO2:  98%          Electronically Signed By: HILDA Mendoza CRNA  June 30, 2018  1:54 PM

## 2018-07-01 PROCEDURE — 12000029 ZZH R&B OB INTERMEDIATE

## 2018-07-01 PROCEDURE — 25000132 ZZH RX MED GY IP 250 OP 250 PS 637: Performed by: FAMILY MEDICINE

## 2018-07-01 RX ADMIN — ACETAMINOPHEN 650 MG: 325 TABLET ORAL at 02:47

## 2018-07-01 RX ADMIN — FLUTICASONE PROPIONATE 2 SPRAY: 50 SPRAY, METERED NASAL at 09:15

## 2018-07-01 RX ADMIN — OXYCODONE HYDROCHLORIDE 5 MG: 5 TABLET ORAL at 23:05

## 2018-07-01 RX ADMIN — ACETAMINOPHEN 650 MG: 325 TABLET ORAL at 23:05

## 2018-07-01 RX ADMIN — IBUPROFEN 800 MG: 800 TABLET ORAL at 14:34

## 2018-07-01 RX ADMIN — FLUTICASONE FUROATE AND VILANTEROL TRIFENATATE 1 PUFF: 200; 25 POWDER RESPIRATORY (INHALATION) at 09:15

## 2018-07-01 RX ADMIN — OXYCODONE HYDROCHLORIDE 5 MG: 5 TABLET ORAL at 18:59

## 2018-07-01 RX ADMIN — OXYCODONE HYDROCHLORIDE 5 MG: 5 TABLET ORAL at 04:55

## 2018-07-01 RX ADMIN — ACETAMINOPHEN 650 MG: 325 TABLET ORAL at 18:59

## 2018-07-01 RX ADMIN — SENNOSIDES AND DOCUSATE SODIUM 2 TABLET: 8.6; 5 TABLET ORAL at 08:40

## 2018-07-01 RX ADMIN — OXYCODONE HYDROCHLORIDE 5 MG: 5 TABLET ORAL at 01:03

## 2018-07-01 RX ADMIN — IBUPROFEN 800 MG: 800 TABLET ORAL at 08:40

## 2018-07-01 RX ADMIN — ACETAMINOPHEN 650 MG: 325 TABLET ORAL at 06:49

## 2018-07-01 RX ADMIN — CETIRIZINE HYDROCHLORIDE 10 MG: 10 TABLET, FILM COATED ORAL at 20:38

## 2018-07-01 RX ADMIN — ACETAMINOPHEN 650 MG: 325 TABLET ORAL at 10:49

## 2018-07-01 RX ADMIN — OXYCODONE HYDROCHLORIDE 5 MG: 5 TABLET ORAL at 14:34

## 2018-07-01 RX ADMIN — ACETAMINOPHEN 650 MG: 325 TABLET ORAL at 14:34

## 2018-07-01 RX ADMIN — IBUPROFEN 800 MG: 800 TABLET ORAL at 02:47

## 2018-07-01 RX ADMIN — IBUPROFEN 800 MG: 800 TABLET ORAL at 20:33

## 2018-07-01 RX ADMIN — HYDROCORTISONE: 25 CREAM TOPICAL at 17:03

## 2018-07-01 NOTE — PLAN OF CARE
Problem: Postpartum (Vaginal Delivery) (Adult,Obstetrics,Pediatric)  Goal: Signs and Symptoms of Listed Potential Problems Will be Absent, Minimized or Managed (Postpartum)  Signs and symptoms of listed potential problems will be absent, minimized or managed by discharge/transition of care (reference Postpartum (Vaginal Delivery) (Adult,Obstetrics,Pediatric) CPG).   Care transferred to Kailey BO RN at 1130.

## 2018-07-01 NOTE — PLAN OF CARE
Problem: Patient Care Overview  Goal: Plan of Care/Patient Progress Review  Outcome: Improving  S: Shift review   B:Kena is a  who delivered vaginally on @ 0131, 2nd degree tear with repair.  A: VSS, patient is independent with mobility, pain fairly well controlled with p.o. pain meds. Needing to use routine pain medications along with diaper ice pack to perineum, Dermoplast spray and tub soaks. Was able to get some sleep during the night when  in nursery. Handles baby with confidence. Rubella status not found in patient chart. Note left for MD to address.   R: Continue with routine PP care and pain control measures. Paperwork to be completed today. Anticipate D/C to home tomorrow.

## 2018-07-01 NOTE — PLAN OF CARE
Problem: Postpartum (Vaginal Delivery) (Adult,Obstetrics,Pediatric)  Goal: Signs and Symptoms of Listed Potential Problems Will be Absent, Minimized or Managed (Postpartum)  Signs and symptoms of listed potential problems will be absent, minimized or managed by discharge/transition of care (reference Postpartum (Vaginal Delivery) (Adult,Obstetrics,Pediatric) CPG).   Outcome: Improving  S-(situation): end of shift note    B-(background): post vaginal delivery.      A-(assessment): Bottle feeding.  Having a bit of yuko pain and hemorrhoid pain. Using medication and soaking in the tub.    R-(recommendations): Will report to the next shift.

## 2018-07-01 NOTE — PROGRESS NOTES
[facility]    Obstetrics Daily Post-Op Note    Assessment & Plan      -* No surgery found *    Doing well.    Plan:  -Ambulate  -Advance activity as tolerated  -Continue supportive and symptomatic treatment  -Pain control measures  -Advance diet as tolerated  -Routine wound care  -     Active Problems:    Encounter for triage in pregnant patient    Amniotic fluid leaking     (normal spontaneous vaginal delivery)      Lance Tang    Subjective:  Interval History   Stable.  Doing well.  Improving slowly.  Pain is reasonably controlled.  No fevers. Lochia as expected for this stage.      Exam:  Physical Exam   Vitals:    18 1145 18 1600 18 2345 18 0800   BP: 130/71 121/62 124/82 140/70   Pulse: 78 78 94 94   Resp: 16 14 16 16   Temp: 98.2  F (36.8  C) 98.2  F (36.8  C) 97  F (36.1  C) 97.9  F (36.6  C)   TempSrc: Oral Oral Oral Oral   SpO2:           Constitutional: healthy, alert and no distress  Abdomen:  Uterine fundus is firm, expected tenderness at the level of the umbilicus, incision:   Extremeties:  No edema, pulses intact, scd's in place      Medications     - MEDICATION INSTRUCTIONS -       NO Rho (D) immune globulin (RhoGam) needed - mother Rh POSITIVE       - MEDICATION INSTRUCTIONS -       oxytocin in 0.9% NaCl          cetirizine  10 mg Oral QPM     fluticasone  2 spray Both Nostrils Daily     fluticasone-vilanterol  1 puff Inhalation Daily     senna-docusate  1 tablet Oral BID    Or     senna-docusate  2 tablet Oral BID       Data   Hemoglobin   Date Value Ref Range Status   2018 12.6 11.7 - 15.7 g/dL Final   2018 13.3 11.7 - 15.7 g/dL Final     No results found for: RUBELLAABIGG   Lab Results   Component Value Date    ABO O 2018           Lab Results   Component Value Date    RH Pos 2018      Lab Results   Component Value Date    GLC 82 10/31/2001         Lance Tang  Pg :[phone]

## 2018-07-02 VITALS
SYSTOLIC BLOOD PRESSURE: 136 MMHG | RESPIRATION RATE: 16 BRPM | HEART RATE: 86 BPM | OXYGEN SATURATION: 98 % | TEMPERATURE: 97.9 F | DIASTOLIC BLOOD PRESSURE: 72 MMHG

## 2018-07-02 PROCEDURE — 25000132 ZZH RX MED GY IP 250 OP 250 PS 637: Performed by: FAMILY MEDICINE

## 2018-07-02 RX ADMIN — IBUPROFEN 800 MG: 800 TABLET ORAL at 11:51

## 2018-07-02 RX ADMIN — FLUTICASONE FUROATE AND VILANTEROL TRIFENATATE 1 PUFF: 200; 25 POWDER RESPIRATORY (INHALATION) at 09:19

## 2018-07-02 RX ADMIN — ACETAMINOPHEN 650 MG: 325 TABLET ORAL at 11:51

## 2018-07-02 RX ADMIN — OXYCODONE HYDROCHLORIDE 5 MG: 5 TABLET ORAL at 06:57

## 2018-07-02 RX ADMIN — ACETAMINOPHEN 650 MG: 325 TABLET ORAL at 06:57

## 2018-07-02 RX ADMIN — OXYCODONE HYDROCHLORIDE 5 MG: 5 TABLET ORAL at 02:47

## 2018-07-02 RX ADMIN — IBUPROFEN 800 MG: 800 TABLET ORAL at 02:47

## 2018-07-02 RX ADMIN — ACETAMINOPHEN 650 MG: 325 TABLET ORAL at 02:47

## 2018-07-02 RX ADMIN — FLUTICASONE PROPIONATE 2 SPRAY: 50 SPRAY, METERED NASAL at 09:19

## 2018-07-02 RX ADMIN — SENNOSIDES AND DOCUSATE SODIUM 2 TABLET: 8.6; 5 TABLET ORAL at 09:19

## 2018-07-02 RX ADMIN — OXYCODONE HYDROCHLORIDE 5 MG: 5 TABLET ORAL at 11:51

## 2018-07-02 NOTE — DISCHARGE SUMMARY
[facility] Discharge Summary    Kena Sorto MRN# [id number]   Age: 23 year old YOB: 1995     Date of Admission:  2018  Date of Discharge::  2018  Admitting Physician:  Lance Tang MD  Discharge Physician:  Lance Tang MD            Admission Diagnoses:   Encounter for triage in pregnant patient  Amniotic fluid leaking   (normal spontaneous vaginal delivery)  Active Problems:    Encounter for triage in pregnant patient    Amniotic fluid leaking     (normal spontaneous vaginal delivery)               Discharge Diagnosis:   Active Problems:    Encounter for triage in pregnant patient    Amniotic fluid leaking     (normal spontaneous vaginal delivery)               Procedures:   Procedure(s):             Medications Prior to Admission:     Prescriptions Prior to Admission   Medication Sig Dispense Refill Last Dose     budesonide-formoterol (SYMBICORT) 160-4.5 MCG/ACT Inhaler Inhale 2 puffs into the lungs 2 times daily 3 Inhaler 3 2018 at 0800     cetirizine (ZYRTEC ALLERGY) 10 MG tablet Take 1 tablet (10 mg) by mouth daily 90 tablet 3 2018 at 2230     hydrOXYzine (ATARAX) 25 MG tablet TAKE ONE TO TWO TABLETS BY MOUTH EVERY 4 HOURS AS NEEDED FOR ITCHING 90 tablet 1 2018 at 2230     mometasone (NASONEX) 50 MCG/ACT spray Spray 2 sprays into both nostrils daily 3 Box 3 2018 at 0800     Prenatal Vit-Fe Fumarate-FA (PRENATAL MULTIVITAMIN PLUS IRON) 27-0.8 MG TABS per tablet Take 1 tablet by mouth daily   2018 at 0800     albuterol (PROAIR HFA/PROVENTIL HFA/VENTOLIN HFA) 108 (90 BASE) MCG/ACT Inhaler Inhale 2 puffs into the lungs every 6 hours as needed for shortness of breath / dyspnea or wheezing 2 Inhaler 3 More than a month at Unknown time     [DISCONTINUED] polyethylene glycol (MIRALAX) powder Take 17 g (1 capful) by mouth daily (Patient not taking: Reported on 2018) 510 g 1 Unknown at Unknown time             Discharge Medications:      Current Discharge Medication List      CONTINUE these medications which have NOT CHANGED    Details   budesonide-formoterol (SYMBICORT) 160-4.5 MCG/ACT Inhaler Inhale 2 puffs into the lungs 2 times daily  Qty: 3 Inhaler, Refills: 3    Associated Diagnoses: Mild persistent asthma without complication      cetirizine (ZYRTEC ALLERGY) 10 MG tablet Take 1 tablet (10 mg) by mouth daily  Qty: 90 tablet, Refills: 3    Associated Diagnoses: Seasonal allergic rhinitis, unspecified chronicity, unspecified trigger      hydrOXYzine (ATARAX) 25 MG tablet TAKE ONE TO TWO TABLETS BY MOUTH EVERY 4 HOURS AS NEEDED FOR ITCHING  Qty: 90 tablet, Refills: 1    Associated Diagnoses: Rash and nonspecific skin eruption      mometasone (NASONEX) 50 MCG/ACT spray Spray 2 sprays into both nostrils daily  Qty: 3 Box, Refills: 3    Associated Diagnoses: Seasonal allergic rhinitis, unspecified chronicity, unspecified trigger      Prenatal Vit-Fe Fumarate-FA (PRENATAL MULTIVITAMIN PLUS IRON) 27-0.8 MG TABS per tablet Take 1 tablet by mouth daily      albuterol (PROAIR HFA/PROVENTIL HFA/VENTOLIN HFA) 108 (90 BASE) MCG/ACT Inhaler Inhale 2 puffs into the lungs every 6 hours as needed for shortness of breath / dyspnea or wheezing  Qty: 2 Inhaler, Refills: 3    Associated Diagnoses: Mild persistent asthma without complication         STOP taking these medications       polyethylene glycol (MIRALAX) powder Comments:   Reason for Stopping:                     Consultations:   No consultations were requested during this admission          Brief History of Labor or Admission:   Admitted with PROM.            Hospital Course:   The patient's hospital course was unremarkable.  She recovered as anticipated and experienced no post-operative complications.  On discharge, her pain was well controlled. Vaginal bleeding is similar to peak menstrual flow.  Voiding without difficulty.  Ambulating well and tolerating a normal diet.  No fever or significant  wound drainage.  Breastfeeding well.  Infant is stable. She was discharged on post-partum day #2.    Post-partum hemoglobin:   Hemoglobin   Date Value Ref Range Status   06/13/2018 12.6 11.7 - 15.7 g/dL Final             Discharge Instructions and Follow-Up:   Discharge diet: Advance to a regular diet as tolerated   Discharge activity: No heavy lifting, pushing, pulling for 2 week(s)  Pelvic rest: abstain from intercourse and do not use tampons for 6 week(s)   Discharge follow-up: Follow up with primary care provider in 6-8 weeks   Wound care: Drink plenty of fluids  Ice to area for comfort  Keep wound clean and dry           Discharge Disposition:   Discharged to home          Lance Tang MD

## 2018-07-02 NOTE — PROGRESS NOTES
S-(situation): Discharge  to home    B-(background): Patient had a Vaginal delivery with 2 degree tear with repair. Baby girl Baby's name , bottle:  Similac Advance. Support person's name Vaughn Chan    A-(assessment): pt is taking oxycodone, ibuprofen, tylenol, ice pack, and dibucaine for perineum pain.  Voiding without difficulty.  Bonding well with baby.  States she feels comfortable with mom and baby cares.    R-(recommendations):  Discharge instructions reviewed and questions answered.  Belongings gathered and returned to the patient. Agreed to follow up in 6 weeks or sooner with any question or concerns.     Nursing Discharge Checklist:    Pneumovax screened and given, if appropriate: N/A  Influenza vaccine screened and given, if appropriate: N/A  Staples removed (): N/A  Breast milk returned: N/A  Hydrogel pads sent home:N/A  Birth Certificate Done: YES

## 2018-07-02 NOTE — PLAN OF CARE
Problem: Patient Care Overview  Goal: Plan of Care/Patient Progress Review  Outcome: Improving  S: Shift review ([id number])  B:Kena is a  who delivered vaginally on  @ 0131  A: VSS, patient is independent with mobility, pain well controlled with p.o. pain meds, Ibup.Oxycodone and plain Tylenol. Handles baby with confidence, formula feeding. No new concerns.  R: Continue with routine PP care and pain control measures for perineal pain. Anticipate D/C to home today.

## 2018-07-05 ENCOUNTER — TELEPHONE (OUTPATIENT)
Dept: FAMILY MEDICINE | Facility: CLINIC | Age: 23
End: 2018-07-05

## 2018-07-05 ENCOUNTER — OFFICE VISIT (OUTPATIENT)
Dept: FAMILY MEDICINE | Facility: CLINIC | Age: 23
End: 2018-07-05
Payer: COMMERCIAL

## 2018-07-05 VITALS
SYSTOLIC BLOOD PRESSURE: 116 MMHG | WEIGHT: 200 LBS | OXYGEN SATURATION: 100 % | HEART RATE: 80 BPM | BODY MASS INDEX: 34.33 KG/M2 | RESPIRATION RATE: 16 BRPM | TEMPERATURE: 98 F | DIASTOLIC BLOOD PRESSURE: 72 MMHG

## 2018-07-05 PROBLEM — Z36.89 ENCOUNTER FOR TRIAGE IN PREGNANT PATIENT: Status: RESOLVED | Noted: 2018-06-29 | Resolved: 2018-07-05

## 2018-07-05 PROBLEM — O42.90 AMNIOTIC FLUID LEAKING: Status: RESOLVED | Noted: 2018-06-29 | Resolved: 2018-07-05

## 2018-07-05 PROBLEM — Z34.00 ENCOUNTER FOR SUPERVISION OF NORMAL FIRST PREGNANCY, UNSPECIFIED TRIMESTER: Status: RESOLVED | Noted: 2018-01-16 | Resolved: 2018-07-05

## 2018-07-05 PROCEDURE — 99214 OFFICE O/P EST MOD 30 MIN: CPT | Performed by: PHYSICIAN ASSISTANT

## 2018-07-05 RX ORDER — CITALOPRAM HYDROBROMIDE 10 MG/1
10 TABLET ORAL DAILY
Qty: 30 TABLET | Refills: 0 | Status: SHIPPED | OUTPATIENT
Start: 2018-07-05 | End: 2018-07-26

## 2018-07-05 ASSESSMENT — PAIN SCALES - GENERAL: PAINLEVEL: NO PAIN (0)

## 2018-07-05 NOTE — NURSING NOTE
"No chief complaint on file.      Initial /72  Pulse 80  Temp 98  F (36.7  C) (Temporal)  Resp 16  Wt 200 lb (90.7 kg)  LMP 10/02/2017  SpO2 100%  BMI 34.33 kg/m2 Estimated body mass index is 34.33 kg/(m^2) as calculated from the following:    Height as of 1/8/18: 5' 4\" (1.626 m).    Weight as of this encounter: 200 lb (90.7 kg).  BP completed using cuff size: levy Bland MA      "

## 2018-07-05 NOTE — TELEPHONE ENCOUNTER
"Kena Sorto is a 23 year old female who calls with concerns of a suture falling out.    NURSING ASSESSMENT:  Description:  Patient reports that she had 7 inch vaginal tear during delivery. She notes that one of the sutures is hanging down and there is about a 2 inch \"string\" hanging. She states it feels like suture material but she cannot get a good look at it. She has noted a slight increase in bleeding but has only used 2 pads/24 hours.  Denies any increased pain, no abnormal discharge.  Spoke with Dr. Tang who will see her tomorrow in [place]. Patient aware and will come to appt tomorrow.     Educated the patient about the signs/symptoms that would warrant seeking emergent care. The patient verbalized understanding.         Allergies:   Allergies   Allergen Reactions     Keflex [Cephalexin Hcl] Hives     Penicillins      Prozac [Fluoxetine] Nausea     Patient requested a change           NURSING PLAN: Huddle with provider, plan includes appt tomorrow    RECOMMENDED DISPOSITION:  See in 24 hours - scheduled tomorrow.     Will comply with recommendation: Yes  If further questions/concerns or if symptoms do not improve, worsen or new symptoms develop, call your PCP or [facility] as soon as possible.      Guideline used:  Telephone Triage Protocols for Nurses, Fifth Edition, Hilda Rodriguez RN    "

## 2018-07-05 NOTE — PROGRESS NOTES
"  SUBJECTIVE:   Kena Sorto is a 23 year old female who presents to clinic today for the following health issues:      Abnormal Mood Symptoms -she is 5 days postpartum having had a full-term pregnancy and .  She states she is \"having uncontrollable crying spells\".  She has a long history of depression-has been on many medications throughout her life.  Last medication was fluoxetine 20 mg daily.  She stopped taking this when she found out she was pregnant and was not on any antidepressants throughout the pregnancy.  Actually did very well through pregnancy.  Prior to the use of fluoxetine had been on Effexor And prior to that was on Lexapro which was initiated in .  She has done random counseling throughout the years as well.  Today does not feel she would like to move forward with counseling, but is requesting to be started on an antidepressant.  She denies any thoughts of harm to self or others and when asked directly about her infant, she states that she has never had thoughts about harming her.  She is not breast-feeding her .  Had no issues with significant depression through the pregnancy.    [place] depression screen was done today while she was here and she scored a 15.    Onset: Has worsened since delivery of her  baby 5 days ago.  Escalated yesterday.  Has had minimal sleep.    Description:   Depression: YES  Anxiety: no    Accompanying Signs & Symptoms:  Still participating in activities that you used to enjoy: no  Fatigue: YES  Irritability: YES  Difficulty concentrating: no  Changes in appetite: no  Problems with sleep: YES-with  baby is not sleeping  Heart racing/beating fast : no  Thoughts of hurting yourself or others: none    History:   Recent stress: YES-vaginal delivery of a healthy  female  Prior depression hospitalization: None  Family history of depression: YES-mother, father, sister  Family history of anxiety: YES    Precipitating factors:   Alcohol/drug " use: no    Alleviating factors:  Has not tried anything.    Therapies Tried and outcome: See above        Problem list and histories reviewed & adjusted, as indicated.  Additional history: as documented    Patient Active Problem List   Diagnosis     Attention deficit disorder     Generalized anxiety disorder     Chronic adenoiditis     Hypertrophy of nasal turbinates     Obesity     Geographic tongue     Acute reaction to stress     Major depressive disorder, recurrent episode, mild (H)     Mild persistent asthma without complication     Seasonal allergic rhinitis     Constipation, unspecified constipation type     Encounter for supervision of normal first pregnancy, unspecified trimester     Encounter for triage in pregnant patient     Amniotic fluid leaking      (normal spontaneous vaginal delivery)     Past Surgical History:   Procedure Laterality Date     HC THERAPUTIC FRACTURE INFER TURBINATE  03/30/10     TONSILLECTOMY & ADENOIDECTOMY  03/30/10    turbinoplasty       Social History   Substance Use Topics     Smoking status: Never Smoker     Smokeless tobacco: Never Used      Comment: no smokers in the household     Alcohol use No     Family History   Problem Relation Age of Onset     Hypertension Mother      Hyperlipidemia Mother      GASTROINTESTINAL DISEASE Father      HEART DISEASE Father      Back Pain Father      Anxiety Disorder Sister      Depression Sister      Hearing Loss Sister      congenital     Coronary Artery Disease Maternal Grandmother      Cerebrovascular Disease Maternal Grandmother      Diabetes Maternal Grandfather      Type II     Cerebrovascular Disease Maternal Grandfather      Cancer Paternal Grandmother      lung (she was a smoker)     Coronary Artery Disease Paternal Grandfather      aortic aneurysm           Reviewed and updated as needed this visit by clinical staff  Tobacco  Allergies  Meds       Reviewed and updated as needed this visit by Provider          ROS:  Constitutional, HEENT, cardiovascular, pulmonary, gi and gu systems are negative, except as otherwise noted.    OBJECTIVE:     /72  Pulse 80  Temp 98  F (36.7  C) (Temporal)  Resp 16  Wt 200 lb (90.7 kg)  LMP 10/02/2017  SpO2 100%  BMI 34.33 kg/m2  Body mass index is 34.33 kg/(m^2).   GENERAL: alert and no distress  PSYCH: mentation appears normal, tearful, judgement and insight intact and appearance well groomed    Diagnostic Test Results:  none     ASSESSMENT:   Post partum depression      PLAN:       ICD-10-CM    1. Post partum depression F53 citalopram (CELEXA) 10 MG tablet           MEDICATIONS:        - Trial of Celexa 10 mg daily.  CONSULTATION/REFERRAL to [facility] discussed, she would like to avoid at this time.  FUTURE APPOINTMENTS:       - Follow-up visit in 3 weeks-appointment was made on the way out before she left today.    I reviewed with her if she had any new thoughts of harm to self or others, she should be seen immediately through the ED.  She has a strong support system with her mother nearby-she is aware of how Kena is feeling and will be seen her on a consistent daily basis as well.    Darcy Duarte PA-C  [facility]    GREATER THAN 50% OF TIME SPENT IN COUNSELING & CARE COORDINATION - TOTAL FACE TO FACE TIME  25 MINUTES.    Orders Placed This Encounter     citalopram (CELEXA) 10 MG tablet       AVS given to patient upon discharge today.  Electronically signed by Darcy Duarte PA-C  July 5, 2018  1:25 PM

## 2018-07-05 NOTE — PROGRESS NOTES
Kena Sorto  Gender: female  : 1995  [address]  [place] [address]  [phone] (home)   Medical Record: [id number]  Primary Care Provider: Darcy Duarte       [facility]   ?   Discharge Phone Call: Key Words/Key Times     How are you and the baby?     How are feedings going?     Voiding & Stooling?     Any questions or concerns?     Follow-up appointment?       We want to provide excellent care here at [facility]. Do you have any feedback for us that would help us improve?     Call back COMMENTS:  Did not call pt on  as she was seen by [facility] in clinic, PPD symptoms. Will call in 1-2 days      Attempted Calls:   _________     __________

## 2018-07-05 NOTE — MR AVS SNAPSHOT
After Visit Summary   7/5/2018    Kena Sorto    MRN: [id number]           Patient Information     Date Of Birth          1995        Visit Information        Provider Department      7/5/2018 12:15 PM Darcy Duarte PA-C [facility]        Today's Diagnoses     Post partum depression    -  1       Follow-ups after your visit        Your next 10 appointments already scheduled     Jul 06, 2018 12:20 PM CDT   SHORT with Lance Tang MD   [facility]    [address]  [place] [address] [phone]            Jul 26, 2018 12:00 PM CDT   Office Visit with Darcy Duarte PA-C   [facility] ([facility]    [address] [address] [phone]           Bring a current list of meds and any records pertaining to this visit. For Physicals, please bring immunization records and any forms needing to be filled out. Please arrive 10 minutes early to complete paperwork.              Who to contact     If you have questions or need follow up information about today's clinic visit or your schedule please contact [facility] directly at [phone].  Normal or non-critical lab and imaging results will be communicated to you by MyChart, letter or phone within 4 business days after the clinic has received the results. If you do not hear from us within 7 days, please contact the clinic through [facility] or phone. If you have a critical or abnormal lab result, we will notify you by phone as soon as possible.  Submit refill requests through [facility] or call your pharmacy and they will forward the refill request to us. Please allow 3 business days for your refill to be completed.          Additional Information About Your Visit        MyChart Information     [facility] gives you secure access to your electronic health record. If you see a primary care provider,  you can also send messages to your care team and make appointments. If you have questions, please call your primary care clinic.  If you do not have a primary care provider, please call [phone] and they will assist you.        Care EveryWhere ID     This is your Care EveryWhere ID. This could be used by other organizations to access your [place] medical records  [id number]        Your Vitals Were     Pulse Temperature Respirations Last Period Pulse Oximetry BMI (Body Mass Index)    80 98  F (36.7  C) (Temporal) 16 10/02/2017 100% 34.33 kg/m2       Blood Pressure from Last 3 Encounters:   07/05/18 116/72   07/02/18 136/72   06/25/18 120/78    Weight from Last 3 Encounters:   07/05/18 200 lb (90.7 kg)   06/25/18 243 lb (110.2 kg)   06/18/18 241 lb (109.3 kg)              Today, you had the following     No orders found for display         Today's Medication Changes          These changes are accurate as of 7/5/18  1:37 PM.  If you have any questions, ask your nurse or doctor.               Start taking these medicines.        Dose/Directions    citalopram 10 MG tablet   Commonly known as:  celeXA   Used for:  Post partum depression   Started by:  Darcy Duarte PA-C        Dose:  10 mg   Take 1 tablet (10 mg) by mouth daily   Quantity:  30 tablet   Refills:  0            Where to get your medicines      These medications were sent to [address]  [address] [facility] [address]     Phone:  [phone]     citalopram 10 MG tablet                Primary Care Provider Office Phone # Fax #    Darcy Duarte PA-C [phone]       [address]   [place] [address]        Equal Access to Services     JOAQUINA MOORE AH: Ac Johnson, wavanesa son, qahortensia kaalmada nina, nino gomez. So [facility] [phone].    ATENCIÓN: Si habla español, tiene a bernal disposición servicios gratuitos de asistencia lingüística.  Diandra ashton [phone].    We comply with applicable federal civil rights laws and [place] laws. We do not discriminate on the basis of race, color, national origin, age, disability, sex, sexual orientation, or gender identity.            Thank you!     Thank you for choosing [facility]  for your care. Our goal is always to provide you with excellent care. Hearing back from our patients is one way we can continue to improve our services. Please take a few minutes to complete the written survey that you may receive in the mail after your visit with us. Thank you!             Your Updated Medication List - Protect others around you: Learn how to safely use, store and throw away your medicines at www.disposemymeds.org.          This list is accurate as of 7/5/18  1:37 PM.  Always use your most recent med list.                   Brand Name Dispense Instructions for use Diagnosis    albuterol 108 (90 Base) MCG/ACT Inhaler    PROAIR HFA/PROVENTIL HFA/VENTOLIN HFA    2 Inhaler    Inhale 2 puffs into the lungs every 6 hours as needed for shortness of breath / dyspnea or wheezing    Mild persistent asthma without complication       budesonide-formoterol 160-4.5 MCG/ACT Inhaler    SYMBICORT    3 Inhaler    Inhale 2 puffs into the lungs 2 times daily    Mild persistent asthma without complication       cetirizine 10 MG tablet    ZYRTEC ALLERGY    90 tablet    Take 1 tablet (10 mg) by mouth daily    Seasonal allergic rhinitis, unspecified chronicity, unspecified trigger       citalopram 10 MG tablet    celeXA    30 tablet    Take 1 tablet (10 mg) by mouth daily    Post partum depression       hydrOXYzine 25 MG tablet    ATARAX    90 tablet    TAKE ONE TO TWO TABLETS BY MOUTH EVERY 4 HOURS AS NEEDED FOR ITCHING    Rash and nonspecific skin eruption       mometasone 50 MCG/ACT spray    NASONEX    3 Box    Spray 2 sprays into both nostrils daily    Seasonal allergic rhinitis, unspecified chronicity, unspecified  trigger

## 2018-07-06 ENCOUNTER — OFFICE VISIT (OUTPATIENT)
Dept: FAMILY MEDICINE | Facility: OTHER | Age: 23
End: 2018-07-06
Payer: COMMERCIAL

## 2018-07-06 VITALS
SYSTOLIC BLOOD PRESSURE: 138 MMHG | OXYGEN SATURATION: 99 % | TEMPERATURE: 97.7 F | DIASTOLIC BLOOD PRESSURE: 84 MMHG | HEART RATE: 62 BPM | WEIGHT: 200 LBS | BODY MASS INDEX: 34.33 KG/M2

## 2018-07-06 DIAGNOSIS — S31.41XD LACERATION OF VAGINA, SUBSEQUENT ENCOUNTER: Primary | ICD-10-CM

## 2018-07-06 PROCEDURE — 99212 OFFICE O/P EST SF 10 MIN: CPT | Performed by: FAMILY MEDICINE

## 2018-07-06 ASSESSMENT — PAIN SCALES - GENERAL: PAINLEVEL: MILD PAIN (3)

## 2018-07-06 NOTE — MR AVS SNAPSHOT
After Visit Summary   7/6/2018    Kena Sorto    MRN: [id number]           Patient Information     Date Of Birth          1995        Visit Information        Provider Department      7/6/2018 12:20 PM Lance Tang MD [facility]        Today's Diagnoses     Laceration of vagina, subsequent encounter    -  1       Follow-ups after your visit        Your next 10 appointments already scheduled     Jul 26, 2018 12:00 PM CDT   Office Visit with Darcy Duarte PA-C   [facility]    [address] [place] [id number] [phone]           Bring a current list of meds and any records pertaining to this visit. For Physicals, please bring immunization records and any forms needing to be filled out. Please arrive 10 minutes early to complete paperwork.              Who to contact     If you have questions or need follow up information about today's clinic visit or your schedule please contact [facility] directly at [phone].  Normal or non-critical lab and imaging results will be communicated to you by CityINhart, letter or phone within 4 business days after the clinic has received the results. If you do not hear from us within 7 days, please contact the clinic through TuneCoret or phone. If you have a critical or abnormal lab result, we will notify you by phone as soon as possible.  Submit refill requests through Conference Hound or call your pharmacy and they will forward the refill request to us. Please allow 3 business days for your refill to be completed.          Additional Information About Your Visit        CityINhart Information     Conference Hound gives you secure access to your electronic health record. If you see a primary care provider, you can also send messages to your care team and make appointments. If you have questions, please call your primary care clinic.  If you do not have a primary care provider,  please call [phone] and they will assist you.        Care EveryWhere ID     This is your Care EveryWhere ID. This could be used by other organizations to access your [facility] medical records  [id number]        Your Vitals Were     Pulse Temperature Last Period Pulse Oximetry Breastfeeding? BMI (Body Mass Index)    62 97.7  F (36.5  C) (Temporal) 10/02/2017 99% No 34.33 kg/m2       Blood Pressure from Last 3 Encounters:   07/06/18 138/84   07/05/18 116/72   07/02/18 136/72    Weight from Last 3 Encounters:   07/06/18 200 lb (90.7 kg)   07/05/18 200 lb (90.7 kg)   06/25/18 243 lb (110.2 kg)              Today, you had the following     No orders found for display       Primary Care Provider Office Phone # Fax #    Darcy Duarte PA-C [phone] [address] [place] [address]        Equal Access to Services     JOSE [facility] : Hadii aad ku hadasho Soomaali, waaxda luqadaha, qaybta kaalmada adeegyada, waxay idiin hayaan benedicto doll . So [facility] [phone].    ATENCIÓN: Si habla español, tiene a bernal disposición servicios gratuitos de asistencia lingüística. LlSelect [facility], [place] [phone].    We comply with applicable federal civil rights laws and [place] laws. We do not discriminate on the basis of race, color, national origin, age, disability, sex, sexual orientation, or gender identity.            Thank you!     Thank you for choosing [facility]  for your care. Our goal is always to provide you with excellent care. Hearing back from our patients is one way we can continue to improve our services. Please take a few minutes to complete the written survey that you may receive in the mail after your visit with us. Thank you!             Your Updated Medication List - Protect others around you: Learn how to safely use, store and throw away your medicines at www.disposemymeds.org.          This list is accurate as of 7/6/18  4:43 PM.  Always use your most recent med list.                    Brand Name Dispense Instructions for use Diagnosis    albuterol 108 (90 Base) MCG/ACT Inhaler    PROAIR HFA/PROVENTIL HFA/VENTOLIN HFA    2 Inhaler    Inhale 2 puffs into the lungs every 6 hours as needed for shortness of breath / dyspnea or wheezing    Mild persistent asthma without complication       budesonide-formoterol 160-4.5 MCG/ACT Inhaler    SYMBICORT    3 Inhaler    Inhale 2 puffs into the lungs 2 times daily    Mild persistent asthma without complication       cetirizine 10 MG tablet    ZYRTEC ALLERGY    90 tablet    Take 1 tablet (10 mg) by mouth daily    Seasonal allergic rhinitis, unspecified chronicity, unspecified trigger       citalopram 10 MG tablet    celeXA    30 tablet    Take 1 tablet (10 mg) by mouth daily    Post partum depression       hydrOXYzine 25 MG tablet    ATARAX    90 tablet    TAKE ONE TO TWO TABLETS BY MOUTH EVERY 4 HOURS AS NEEDED FOR ITCHING    Rash and nonspecific skin eruption       mometasone 50 MCG/ACT spray    NASONEX    3 Box    Spray 2 sprays into both nostrils daily    Seasonal allergic rhinitis, unspecified chronicity, unspecified trigger

## 2018-07-06 NOTE — PROGRESS NOTES
"  SUBJECTIVE:   Kena Sorto is a 23 year old female who presents to clinic today for the following health issues:      HPI     Patient is here to recheck vaginal sutures. She states there is a string hanging out. Her pain is 3/10.      NURSING ASSESSMENT:  Description:  Patient reports that she had 7 inch vaginal tear during delivery. She notes that one of the sutures is hanging down and there is about a 2 inch \"string\" hanging. She states it feels like suture material but she cannot get a good look at it. She has noted a slight increase in bleeding but has only used 2 pads/24 hours.  Denies any increased pain, no abnormal discharge.  Spoke with Dr. Tang who will see her tomorrow in [place]. Patient aware and will come to appt tomorrow.      Educated the patient about the signs/symptoms that would warrant seeking emergent care. The patient verbalized understanding.        Problem list and histories reviewed & adjusted, as indicated.  Additional history:             ROS:      OBJECTIVE:     /84  Pulse 62  Temp 97.7  F (36.5  C) (Temporal)  Wt 200 lb (90.7 kg)  LMP 10/02/2017  SpO2 99%  Breastfeeding? No  BMI 34.33 kg/m2  Body mass index is 34.33 kg/(m^2).  Well-appearing.  There is several suture on the left side of the vaginal wall that is apparent.  These were removed easily with an iris scissors.  Mucosa is healing nicely.  Minimal swelling.    Diagnostic Test Results:  none     ASSESSMENT/PLAN:               ICD-10-CM    1. Laceration of vagina, subsequent encounter S31.41XD        Laceration from her delivery is healing nicely.  No complications develop.  No signs of infection.  Several sutures that were easily removed today.  She can follow-up with her primary for routine check next in 5 weeks    Lance Tang MD  [facility]    a suture falling out.       "

## 2018-07-07 ENCOUNTER — TELEPHONE (OUTPATIENT)
Dept: OBGYN | Facility: CLINIC | Age: 23
End: 2018-07-07

## 2018-07-07 NOTE — TELEPHONE ENCOUNTER
Kena Sorto  Gender: female  : 1995  [address]  [place] [address]  [phone] (home)   Medical Record: [id number]  Primary Care Provider: Darcy Duarte       [facility]   ?   Discharge Phone Call: Key Words/Key Times     Postpartum discharge follow up phone call    Kena is a 22 yo,  8 days post      Kena states that she is doing okay. She was seen in clinic for some vaginal suture removal but feels that she is healing well and that her bleeding has been minimal. Her pain has been manageable. She has also restarted antidepressants per her primary due to some PPD symptoms. She states that she is feeling better since starting medications. She is struggling with being tired as the baby has been up for feedings during the night. She does have help. [place] girl is feeding well with bottle of formula. She is wetting and stooling appropriately. She has times that she stays awake and alert after her night time feedings. Kena was encouraged to keep the lights low and talk softly with night waking to encourage sleepiness with feedings.     Kena has her 6 week PP appt made. She was encouraged to call the [facility] with concerns or questions as needed.

## 2018-07-26 ENCOUNTER — OFFICE VISIT (OUTPATIENT)
Dept: FAMILY MEDICINE | Facility: CLINIC | Age: 23
End: 2018-07-26
Payer: COMMERCIAL

## 2018-07-26 VITALS
HEART RATE: 96 BPM | RESPIRATION RATE: 18 BRPM | WEIGHT: 208 LBS | OXYGEN SATURATION: 99 % | BODY MASS INDEX: 35.7 KG/M2 | TEMPERATURE: 98 F | DIASTOLIC BLOOD PRESSURE: 80 MMHG | SYSTOLIC BLOOD PRESSURE: 120 MMHG

## 2018-07-26 DIAGNOSIS — F33.0 MAJOR DEPRESSIVE DISORDER, RECURRENT EPISODE, MILD (H): ICD-10-CM

## 2018-07-26 DIAGNOSIS — F41.1 GENERALIZED ANXIETY DISORDER: ICD-10-CM

## 2018-07-26 PROCEDURE — 99213 OFFICE O/P EST LOW 20 MIN: CPT | Performed by: PHYSICIAN ASSISTANT

## 2018-07-26 RX ORDER — CITALOPRAM HYDROBROMIDE 10 MG/1
10 TABLET ORAL DAILY
Qty: 90 TABLET | Refills: 1 | Status: SHIPPED | OUTPATIENT
Start: 2018-07-26 | End: 2018-11-16

## 2018-07-26 ASSESSMENT — PAIN SCALES - GENERAL: PAINLEVEL: NO PAIN (0)

## 2018-07-26 NOTE — MR AVS SNAPSHOT
After Visit Summary   7/26/2018    Kena Sorto    MRN: [id number]           Patient Information     Date Of Birth          1995        Visit Information        Provider Department      7/26/2018 12:00 PM Darcy Duarte PA-C [facility]        Today's Diagnoses     Post partum depression    -  1    Major depressive disorder, recurrent episode, mild (H)        Generalized anxiety disorder           Follow-ups after your visit        Your next 10 appointments already scheduled     Aug 13, 2018 11:00 AM CDT   [facility] with Darcy [facility]-C   [facility]    [address] [place] [id number] [phone]            Aug 29, 2018  8:45 AM CDT   [facility] Visit with Darcy Duarte PA-C   [facility]    [address] [place] [id number] [phone]           Bring a current list of meds and any records pertaining to this visit. For Physicals, please bring immunization records and any forms needing to be filled out. Please arrive 10 minutes early to complete paperwork.              Who to contact     If you have questions or need follow up information about today's clinic visit or your schedule please contact [facility] directly at [phone].  Normal or non-critical lab and imaging results will be communicated to you by MyChart, letter or phone within 4 business days after the clinic has received the results. If you do not hear from us within 7 days, please contact the clinic through Artimplant ABhart or phone. If you have a critical or abnormal lab result, we will notify you by phone as soon as possible.  Submit refill requests through [employer] or call your pharmacy and they will forward the refill request to us. Please allow 3 business days for your refill to be completed.          Additional Information About Your Visit        MyChart Information      [employer] gives you secure access to your electronic health record. If you see a primary care provider, you can also send messages to your care team and make appointments. If you have questions, please call your primary care clinic.  If you do not have a primary care provider, please call [phone] and they will assist you.        Care EveryWhere ID     This is your Care EveryWhere ID. This could be used by other organizations to access your [facility] medical records  [id number]        Your Vitals Were     Pulse Temperature Respirations Last Period Pulse Oximetry BMI (Body Mass Index)    96 98  F (36.7  C) (Temporal) 18 10/02/2017 99% 35.7 kg/m2       Blood Pressure from Last 3 Encounters:   07/26/18 120/80   07/06/18 138/84   07/05/18 116/72    Weight from Last 3 Encounters:   07/26/18 208 lb (94.3 kg)   07/06/18 200 lb (90.7 kg)   07/05/18 200 lb (90.7 kg)              Today, you had the following     No orders found for display         Where to get your medicines      These medications were sent to [facility] - [address]  [address], [facility] [address]     Phone:  [phone]     citalopram 10 MG tablet          Primary Care Provider Office Phone # Fax #    Darcy BETZY Duarte PA-C [phone] [address]   [facility] [address]        Equal Access to Services     JOAQUINA MOORE AH: Hadii aad ku hadasho Soomaali, waaxda luqadaha, qaybta kaalmada adeegyada, nino gomez. So [facility] [phone].    ATENCIÓN: Si habla español, tiene a bernal disposición servicios gratuitos de asistencia lingüística. Llame al [phone].    We comply with applicable federal civil rights laws and [place] laws. We do not discriminate on the basis of race, color, national origin, age, disability, sex, sexual orientation, or gender identity.            Thank you!     Thank you for choosing [facility]  for your care. Our goal is always to provide you  with excellent care. Hearing back from our patients is one way we can continue to improve our services. Please take a few minutes to complete the written survey that you may receive in the mail after your visit with us. Thank you!             Your Updated Medication List - Protect others around you: Learn how to safely use, store and throw away your medicines at www.disposemymeds.org.          This list is accurate as of 7/26/18  4:45 PM.  Always use your most recent med list.                   Brand Name Dispense Instructions for use Diagnosis    albuterol 108 (90 Base) MCG/ACT Inhaler    PROAIR HFA/PROVENTIL HFA/VENTOLIN HFA    2 Inhaler    Inhale 2 puffs into the lungs every 6 hours as needed for shortness of breath / dyspnea or wheezing    Mild persistent asthma without complication       budesonide-formoterol 160-4.5 MCG/ACT Inhaler    SYMBICORT    3 Inhaler    Inhale 2 puffs into the lungs 2 times daily    Mild persistent asthma without complication       cetirizine 10 MG tablet    ZYRTEC ALLERGY    90 tablet    Take 1 tablet (10 mg) by mouth daily    Seasonal allergic rhinitis, unspecified chronicity, unspecified trigger       citalopram 10 MG tablet    celeXA    90 tablet    Take 1 tablet (10 mg) by mouth daily    Post partum depression       hydrOXYzine 25 MG tablet    ATARAX    90 tablet    TAKE ONE TO TWO TABLETS BY MOUTH EVERY 4 HOURS AS NEEDED FOR ITCHING    Rash and nonspecific skin eruption       mometasone 50 MCG/ACT spray    NASONEX    3 Box    Spray 2 sprays into both nostrils daily    Seasonal allergic rhinitis, unspecified chronicity, unspecified trigger

## 2018-07-26 NOTE — PROGRESS NOTES
SUBJECTIVE:   Kena Sorto is a 23 year old female who presents to clinic today for the following health issues:      Postpartum depression; history of major depressive disorder; generalized anxiety disorder follow-up  She presented to the clinic 2018 for her  daughters 2 week evaluation.  Was really struggling with postpartum depression and had not been treated with an antidepressant through the pregnancy which was her choice.  She requested being started on medications as she was feeling quite depressed.  No thoughts of harm to self or others.  I started her on citalopram 10 mg daily at that visit.  She is stable and doing great on the medication.  No negative side effects.  Would like to continue with this dose if possible.      Status since last visit: Improved     See PHQ-9 for current symptoms.  Other associated symptoms: None    Complicating factors:   Significant life event:  No   Current substance abuse:  None  Anxiety or Panic symptoms:  No    PHQ-9 2017 10/27/2017 2018   Total Score 17 7 7   Q9: Suicide Ideation Not at all Not at all Not at all       PHQ-9  English  PHQ-9   Any Language  Suicide Assessment Five-step Evaluation and Treatment (SAFE-T)    Amount of exercise or physical activity: 2-3 days/week for an average of 30-45 minutes    Problems taking medications regularly: No    Medication side effects: none    Diet: regular (no restrictions)            Problem list and histories reviewed & adjusted, as indicated.  Additional history: as documented    Patient Active Problem List   Diagnosis     Attention deficit disorder     Generalized anxiety disorder     Chronic adenoiditis     Hypertrophy of nasal turbinates     Obesity     Geographic tongue     Acute reaction to stress     Major depressive disorder, recurrent episode, mild (H)     Mild persistent asthma without complication     Seasonal allergic rhinitis     Constipation, unspecified constipation type     Post partum  depression     Past Surgical History:   Procedure Laterality Date     HC THERAPUTIC FRACTURE INFER TURBINATE  03/30/10     TONSILLECTOMY & ADENOIDECTOMY  03/30/10    turbinoplasty       Social History   Substance Use Topics     Smoking status: Never Smoker     Smokeless tobacco: Never Used      Comment: no smokers in the household     Alcohol use No     Family History   Problem Relation Age of Onset     Hypertension Mother      Hyperlipidemia Mother      GASTROINTESTINAL DISEASE Father      HEART DISEASE Father      Back Pain Father      Anxiety Disorder Sister      Depression Sister      Hearing Loss Sister      congenital     Coronary Artery Disease Maternal Grandmother      Cerebrovascular Disease Maternal Grandmother      Diabetes Maternal Grandfather      Type II     Cerebrovascular Disease Maternal Grandfather      Cancer Paternal Grandmother      lung (she was a smoker)     Coronary Artery Disease Paternal Grandfather      aortic aneurysm           Reviewed and updated as needed this visit by clinical staff  Allergies  Meds       Reviewed and updated as needed this visit by Provider         ROS:  Constitutional, HEENT, cardiovascular, pulmonary, gi and gu systems are negative, except as otherwise noted.    OBJECTIVE:     /80  Pulse 96  Temp 98  F (36.7  C) (Temporal)  Resp 18  Wt 208 lb (94.3 kg)  LMP 10/02/2017  SpO2 99%  BMI 35.7 kg/m2  Body mass index is 35.7 kg/(m^2).   GENERAL: alert and no distress  PSYCH: PSYCH: No signs of self harming behaviours. Normal hygiene & dress. Eye contact normal. Speech/mentation normal.      Diagnostic Test Results:  none     ASSESSMENT:      Post partum depression  Major depressive disorder, recurrent episode, mild (H)  Generalized anxiety disorder      PLAN:       ICD-10-CM    1. Post partum depression F53 citalopram (CELEXA) 10 MG tablet   2. Major depressive disorder, recurrent episode, mild (H) F33.0    3. Generalized anxiety disorder F41.1             MEDICATIONS:        -Refilled citalopram 10 mg daily ×6 months.  FUTURE APPOINTMENTS:       - Follow-up visit in mid August for 6 week postpartum exam-will reevaluate emotional status with that visit as well as upcoming  visits.  Exercise and healthy lifestyle including diet are imperative to mental well being.       Darcy Duarte PA-C  [facility]    GREATER THAN 50% OF TIME SPENT IN COUNSELING & CARE COORDINATION - TOTAL FACE TO FACE TIME  15 MINUTES.    Orders Placed This Encounter     citalopram (CELEXA) 10 MG tablet       AVS given to patient upon discharge today.  Electronically signed by Darcy Duarte PA-C  2018  4:41 PM

## 2018-07-26 NOTE — NURSING NOTE
"Chief Complaint   Patient presents with     Depression       Initial /80  Pulse 96  Temp 98  F (36.7  C) (Temporal)  Resp 18  Wt 208 lb (94.3 kg)  LMP 10/02/2017  SpO2 99%  BMI 35.7 kg/m2 Estimated body mass index is 35.7 kg/(m^2) as calculated from the following:    Height as of 1/8/18: 5' 4\" (1.626 m).    Weight as of this encounter: 208 lb (94.3 kg).  BP completed using cuff size: levy Bland MA      "

## 2018-07-27 ASSESSMENT — PATIENT HEALTH QUESTIONNAIRE - PHQ9: SUM OF ALL RESPONSES TO PHQ QUESTIONS 1-9: 5

## 2018-07-27 ASSESSMENT — ASTHMA QUESTIONNAIRES: ACT_TOTALSCORE: 24

## 2018-07-29 ENCOUNTER — TELEPHONE (OUTPATIENT)
Dept: FAMILY MEDICINE | Facility: CLINIC | Age: 23
End: 2018-07-29

## 2018-07-29 ENCOUNTER — HOSPITAL ENCOUNTER (EMERGENCY)
Facility: CLINIC | Age: 23
Discharge: HOME | End: 2018-07-29
Attending: NURSE PRACTITIONER | Admitting: NURSE PRACTITIONER
Payer: COMMERCIAL

## 2018-07-29 VITALS
SYSTOLIC BLOOD PRESSURE: 136 MMHG | RESPIRATION RATE: 14 BRPM | DIASTOLIC BLOOD PRESSURE: 78 MMHG | OXYGEN SATURATION: 98 % | HEART RATE: 78 BPM | TEMPERATURE: 100 F

## 2018-07-29 DIAGNOSIS — N39.0 URINARY TRACT INFECTION IN FEMALE: ICD-10-CM

## 2018-07-29 DIAGNOSIS — R50.9 FEVER IN ADULT: ICD-10-CM

## 2018-07-29 DIAGNOSIS — N76.0 BACTERIAL VAGINOSIS: ICD-10-CM

## 2018-07-29 DIAGNOSIS — B96.89 BACTERIAL VAGINOSIS: ICD-10-CM

## 2018-07-29 LAB
ALBUMIN SERPL-MCNC: 3 G/DL (ref 3.4–5)
ALBUMIN UR-MCNC: NEGATIVE MG/DL
ALP SERPL-CCNC: 50 U/L (ref 40–150)
ALT SERPL W P-5'-P-CCNC: 16 U/L (ref 0–50)
AMORPH CRY #/AREA URNS HPF: ABNORMAL /HPF
ANION GAP SERPL CALCULATED.3IONS-SCNC: 10 MMOL/L (ref 3–14)
APPEARANCE UR: ABNORMAL
AST SERPL W P-5'-P-CCNC: 17 U/L (ref 0–45)
BACTERIA #/AREA URNS HPF: ABNORMAL /HPF
BASOPHILS # BLD AUTO: 0 10E9/L (ref 0–0.2)
BASOPHILS NFR BLD AUTO: 0.5 %
BILIRUB SERPL-MCNC: 0.4 MG/DL (ref 0.2–1.3)
BILIRUB UR QL STRIP: NEGATIVE
BUN SERPL-MCNC: 7 MG/DL (ref 7–30)
CALCIUM SERPL-MCNC: 8.1 MG/DL (ref 8.5–10.1)
CHLORIDE SERPL-SCNC: 108 MMOL/L (ref 94–109)
CO2 SERPL-SCNC: 22 MMOL/L (ref 20–32)
COLOR UR AUTO: YELLOW
CREAT SERPL-MCNC: 0.67 MG/DL (ref 0.52–1.04)
CRP SERPL-MCNC: 29.2 MG/L (ref 0–8)
DIFFERENTIAL METHOD BLD: ABNORMAL
EOSINOPHIL NFR BLD AUTO: 7.5 %
ERYTHROCYTE [DISTWIDTH] IN BLOOD BY AUTOMATED COUNT: 12.5 % (ref 10–15)
GFR SERPL CREATININE-BSD FRML MDRD: >90 ML/MIN/1.7M2
GLUCOSE SERPL-MCNC: 81 MG/DL (ref 70–99)
GLUCOSE UR STRIP-MCNC: NEGATIVE MG/DL
HCG UR QL: NEGATIVE
HCT VFR BLD AUTO: 36.6 % (ref 35–47)
HGB BLD-MCNC: 12.5 G/DL (ref 11.7–15.7)
HGB UR QL STRIP: ABNORMAL
IMM GRANULOCYTES # BLD: 0 10E9/L (ref 0–0.4)
IMM GRANULOCYTES NFR BLD: 0.3 %
KETONES UR STRIP-MCNC: NEGATIVE MG/DL
LACTATE BLD-SCNC: 0.5 MMOL/L (ref 0.7–2)
LEUKOCYTE ESTERASE UR QL STRIP: ABNORMAL
LYMPHOCYTES # BLD AUTO: 0.8 10E9/L (ref 0.8–5.3)
LYMPHOCYTES NFR BLD AUTO: 20.9 %
MCH RBC QN AUTO: 28.3 PG (ref 26.5–33)
MCHC RBC AUTO-ENTMCNC: 34.2 G/DL (ref 31.5–36.5)
MCV RBC AUTO: 83 FL (ref 78–100)
MONOCYTES # BLD AUTO: 0.4 10E9/L (ref 0–1.3)
MONOCYTES NFR BLD AUTO: 10.2 %
MUCOUS THREADS #/AREA URNS LPF: PRESENT /LPF
NEUTROPHILS # BLD AUTO: 2.3 10E9/L (ref 1.6–8.3)
NEUTROPHILS NFR BLD AUTO: 60.6 %
NITRATE UR QL: NEGATIVE
NRBC # BLD AUTO: 0 10*3/UL
NRBC BLD AUTO-RTO: 0 /100
PH UR STRIP: 6 PH (ref 5–7)
PLATELET # BLD AUTO: 243 10E9/L (ref 150–450)
POTASSIUM SERPL-SCNC: 3.7 MMOL/L (ref 3.4–5.3)
PROT SERPL-MCNC: 6.3 G/DL (ref 6.8–8.8)
RBC # BLD AUTO: 4.42 10E12/L (ref 3.8–5.2)
RBC #/AREA URNS AUTO: 3 /HPF (ref 0–2)
SODIUM SERPL-SCNC: 140 MMOL/L (ref 133–144)
SOURCE: ABNORMAL
SP GR UR STRIP: 1.01 (ref 1–1.03)
SPECIMEN SOURCE: ABNORMAL
SQUAMOUS #/AREA URNS AUTO: 7 /HPF (ref 0–1)
UROBILINOGEN UR STRIP-MCNC: 4 MG/DL (ref 0–2)
WBC # BLD AUTO: 3.7 10E9/L (ref 4–11)
WBC #/AREA URNS AUTO: 76 /HPF (ref 0–5)
WBC CLUMPS #/AREA URNS HPF: PRESENT /HPF
WET PREP SPEC: ABNORMAL

## 2018-07-29 PROCEDURE — 99284 EMERGENCY DEPT VISIT MOD MDM: CPT | Mod: Z6 | Performed by: NURSE PRACTITIONER

## 2018-07-29 PROCEDURE — 87591 N.GONORRHOEAE DNA AMP PROB: CPT | Performed by: NURSE PRACTITIONER

## 2018-07-29 PROCEDURE — 25000128 H RX IP 250 OP 636: Performed by: NURSE PRACTITIONER

## 2018-07-29 PROCEDURE — 99284 EMERGENCY DEPT VISIT MOD MDM: CPT | Mod: 25 | Performed by: NURSE PRACTITIONER

## 2018-07-29 PROCEDURE — 87086 URINE CULTURE/COLONY COUNT: CPT | Performed by: NURSE PRACTITIONER

## 2018-07-29 PROCEDURE — 83605 ASSAY OF LACTIC ACID: CPT | Performed by: NURSE PRACTITIONER

## 2018-07-29 PROCEDURE — 86140 C-REACTIVE PROTEIN: CPT | Performed by: NURSE PRACTITIONER

## 2018-07-29 PROCEDURE — 85025 COMPLETE CBC W/AUTO DIFF WBC: CPT | Performed by: NURSE PRACTITIONER

## 2018-07-29 PROCEDURE — 81025 URINE PREGNANCY TEST: CPT | Performed by: NURSE PRACTITIONER

## 2018-07-29 PROCEDURE — 96360 HYDRATION IV INFUSION INIT: CPT | Performed by: NURSE PRACTITIONER

## 2018-07-29 PROCEDURE — 80053 COMPREHEN METABOLIC PANEL: CPT | Performed by: NURSE PRACTITIONER

## 2018-07-29 PROCEDURE — 81001 URINALYSIS AUTO W/SCOPE: CPT | Performed by: NURSE PRACTITIONER

## 2018-07-29 PROCEDURE — 87210 SMEAR WET MOUNT SALINE/INK: CPT | Performed by: NURSE PRACTITIONER

## 2018-07-29 PROCEDURE — 96361 HYDRATE IV INFUSION ADD-ON: CPT | Performed by: NURSE PRACTITIONER

## 2018-07-29 PROCEDURE — 87491 CHLMYD TRACH DNA AMP PROBE: CPT | Performed by: NURSE PRACTITIONER

## 2018-07-29 RX ORDER — METRONIDAZOLE 500 MG/1
500 TABLET ORAL 2 TIMES DAILY
Qty: 14 TABLET | Refills: 1 | Status: SHIPPED | OUTPATIENT
Start: 2018-07-29 | End: 2018-08-05

## 2018-07-29 RX ORDER — ONDANSETRON 4 MG/1
4 TABLET, ORALLY DISINTEGRATING ORAL EVERY 8 HOURS PRN
Qty: 20 TABLET | Refills: 0 | Status: SHIPPED | OUTPATIENT
Start: 2018-07-29 | End: 2018-08-01

## 2018-07-29 RX ORDER — ONDANSETRON 2 MG/ML
4 INJECTION INTRAMUSCULAR; INTRAVENOUS EVERY 30 MIN PRN
Status: DISCONTINUED | OUTPATIENT
Start: 2018-07-29 | End: 2018-07-29 | Stop reason: HOSPADM

## 2018-07-29 RX ORDER — CLINDAMYCIN HCL 300 MG
300 CAPSULE ORAL 3 TIMES DAILY
Qty: 21 CAPSULE | Refills: 0 | Status: SHIPPED | OUTPATIENT
Start: 2018-07-29 | End: 2018-08-05

## 2018-07-29 RX ADMIN — SODIUM CHLORIDE 1000 ML: 9 INJECTION, SOLUTION INTRAVENOUS at 15:58

## 2018-07-29 ASSESSMENT — ENCOUNTER SYMPTOMS
DIARRHEA: 1
DYSURIA: 1
VOMITING: 1

## 2018-07-29 NOTE — DISCHARGE INSTRUCTIONS
"   * BLADDER INFECTION,Female (Adult)    A bladder infection (\"cystitis\" or \"UTI\") usually causes a constant urge to urinate and a burning when passing urine. Urine may be cloudy, smelly or dark. There may be pain in the lower abdomen. A bladder infection occurs when bacteria from the vaginal area enter the bladder opening (urethra). This can occur from sexual intercourse, wearing tight clothing, dehydration and other factors.  HOME CARE:  1. Drink lots of fluids (at least 6-8 glasses a day, unless you must restrict fluids for other medical reasons). This will force the medicine into your urinary system and flush the bacteria out of your body. Cranberry juice has been shown to help clear out the bacteria.  2. Avoid sexual intercourse until your symptoms are gone.  3. A bladder infection is treated with antibiotics. You may also be given Pyridium (generic = phenazopyridine) to reduce the burning sensation. This medicine will cause your urine to become a bright orange color. The orange urine may stain clothing. You may wear a pad or panty-liner to protect clothing.  PREVENTING FUTURE INFECTIONS:  1. Always wipe from front to back after a bowel movement.  2. Keep the genital area clean and dry.  3. Drink plenty of fluids each day to avoid dehydration.  4. Urinate right after intercourse to flush out the bladder.  5. Wear cotton underwear and cotton-lined panty hose; avoid tight-fitting pants.  6. If you are on birth control pills and are having frequent bladder infections, discuss with your doctor.  FOLLOW UP: Return to this facility or see your doctor if ALL symptoms are not gone after three days of treatment.  GET PROMPT MEDICAL ATTENTION if any of the following occur:    Fever over 101 F (38.3 C)    No improvement by the third day of treatment    Increasing back or abdominal pain    Repeated vomiting; unable to keep medicine down    Weakness, dizziness or fainting    Vaginal discharge    Pain, redness or swelling in " the labia (outer vaginal area)    5103-8280 [employer]). [address] [place] [address], [place] [address]. All rights reserved. This information is not intended as a substitute for professional medical care. Always follow your healthcare professional's instructions.  This information has been modified by your health care provider with permission from the publisher.    Bacterial Vaginosis    You have a vaginal infection called bacterial vaginosis (BV). Both good and bad bacteria are present in a healthy vagina. BV occurs when these bacteria get out of balance. The number of bad bacteria increase. And the number of good bacteria decrease. Although BV is associated with sexual activity, it is not a sexually transmitted disease.  BV may or may not cause symptoms. If symptoms do occur, they can include:    Thin, gray, milky-white, or sometimes green discharge    Unpleasant odor or  fishy  smell    Itching, burning, or pain in or around the vagina  It is not known what causes BV, but certain factors can make the problem more likely. This can include:    Douching    Having sex with a new partner    Having sex with more than one partner  BV will sometimes go away on its own. But treatment is usually recommended. This is because untreated BV can increase the risk of more serious health problems such as:    Pelvic inflammatory disease (PID)     delivery (giving birth to a baby early if you re pregnant)    HIV and certain other sexually transmitted diseases (STDs)    Infection after surgery on the reproductive organs  Home care  General care    BV is most often treated with medicines called antibiotics. These may be given as pills or as a vaginal cream. If antibiotics are prescribed, be sure to use them exactly as directed. Also, be sure to complete all of the medicine, even if your symptoms go away.    Don't douche or having sex during treatment.    If you have sex with a female partner, ask your healthcare  provider if she should also be treated.  Prevention    Don't douche.    Don't have sex. If you do have sex, then take steps to lower your risk:  ? Use condoms when having sex.  ? Limit the number of sexual partners you have.  Follow-up care  Follow up with your healthcare provider, or as advised.  When to seek medical advice  Call your healthcare provider right away if:    You have a fever of 100.4 F (38 C) or higher, or as directed by your provider.    Your symptoms worsen, or they don t go away within a few days of starting treatment.    You have new pain in the lower belly or pelvic region.    You have side effects that bother you or a reaction to the pills or cream you re prescribed.    You or any partners you have sex with have new symptoms, such as a rash, joint pain, or sores.  Date Last Reviewed: 10/1/2017    5074-6991 [employer]. [address] [place] [address]. All rights reserved. This information is not intended as a substitute for professional medical care. Always follow your healthcare professional's instructions.        Obstetric Endometritis    You have endometritis. This means the lining of the uterus is inflamed. It is usually caused by an infection.   These are common symptoms of endometritis:    Fever    Feeling ill    Pain in the lower belly (abdomen)    Abnormal bleeding from the vagina    Bad-smelling fluid from the vagina    Pain when urinating    Pain during sex    Headache    Fatigue    Loss of appetite  Endometritis can be caused by:    Vaginal delivery or     Long labor    Miscarriage        Tissue from the placenta that stays in the uterus after delivery    Vaginal infection  In some cases, an infection called pelvic inflammatory disease (PID) also happens. This affects the ovaries and fallopian tubes. If not treated, PID may lead to infertility. It can also lead to full-body infection (sepsis).  Home care  You may be given antibiotic medicine to  take for the infection. Your symptoms should get better within 2 to 3 days of starting the antibiotics. Take the antibiotics until they are used up. It s important to finish the antibiotics even if you feel better. This is to make sure the infection has cleared up.  General care  You can take acetaminophen or ibuprofen for pain, unless you were given a different pain medicine to use. If you have chronic liver or kidney disease, talk with your healthcare provider before using these medicines. Also talk with your provider if you ve had a stomach ulcer or GI bleeding or are taking blood thinner medicine.    Plan to rest at home for the rest of the day.    Until you have taken all the antibiotics and your symptoms are gone:  ? Don t have sex until your provider says it is OK.  ? Don t put anything into your vagina, including tampons.  ? Don t douche. Douching is generally not recommended at any time.  ? Don t take a tub bath, use a hot tub, or swim. It s fine to shower.  Follow-up care  Follow up with your healthcare provider, or as advised. You may need to have a procedure to clear tissue out of the uterus. This is called dilation and curettage (D&C). Your provider can tell you more about this.  Call 911  Call 911 if any of these occur:    Severe pain and very heavy bleeding    Severe lightheadedness, passing out, or fainting    Rapid heart rate    Trouble breathing    Confusion or difficulty waking up  When to seek medical advice  Call your healthcare provider right away if any of these occur:    Fever of 100.4 F (38 C) or higher, or as directed by your healthcare provider    Symptoms get worse    You have new symptoms    Nausea or vomiting    Your symptoms don t improve within 3 days of starting treatment  Date Last Reviewed: 10/1/2016    9011-4608 [employer]. [address] [place] [address], [place] [address]. All rights reserved. This information is not intended as a substitute for professional medical care.  Always follow your healthcare professional's instructions.

## 2018-07-29 NOTE — ED PROVIDER NOTES
History     Chief Complaint   Patient presents with     Fever     nausea and vomiting     The history is provided by the patient.     Kena Sorto is a 23 year old female who presents to the emergency department with concerns of fever that developed today.  Patient reports that she gave birth 4 weeks ago and did tear substantially, the tearing was repaired.  Yesterday she states that she had some vomiting and diarrhea throughout the day.  This morning she felt back to normal, but 1-2 hours ago she noticed that she had flushed cheeks so she took her temperature.  At this time her temperature was 101.5.  She states that she is concerned she may have a possible infection after giving birth.  She does have constant yellow vaginal discharge that is foul smelling for the last 2-3 weeks.  She has some intermittent dysuria.  Patient has vaginal pain that is described as cramping/tingling.  She has taken 600 mg of ibuprofen to lower her temperature.  She denies any cough or cold symptoms.      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 Provider to review       Generalized anxiety disorder 01/31/2008      Priority: Medium        Past Medical History:    Past Medical History:   Diagnosis Date     Chronic adenoiditis 3/3/2010     Hypertrophy of nasal turbinates 3/3/2010     Iron deficiency anemia, unspecified      Mild intermittent asthma      Plantar fasciitis 3/15/2012     Pneumonia, organism unspecified(486)      Post partum depression 7/5/2018     Seasonal allergies        Past Surgical History:    Past Surgical History:   Procedure Laterality Date     HC THERAPUTIC FRACTURE INFER TURBINATE  03/30/10     TONSILLECTOMY & ADENOIDECTOMY  03/30/10    turbinoplasty       Family History:    Family History   Problem Relation Age of Onset     Hypertension Mother      Hyperlipidemia Mother      GASTROINTESTINAL DISEASE Father      HEART DISEASE Father      Back Pain Father      Anxiety Disorder Sister      Depression Sister      Hearing Loss Sister      congenital     Coronary Artery Disease Maternal Grandmother      Cerebrovascular Disease Maternal Grandmother      Diabetes Maternal Grandfather      Type II     Cerebrovascular Disease Maternal Grandfather      Cancer Paternal Grandmother      lung (she was a smoker)     Coronary Artery Disease Paternal Grandfather      aortic aneurysm       Social History:  Marital Status:  Single [1]  Social History   Substance Use Topics     Smoking status: Never Smoker     Smokeless tobacco: Never Used      Comment: no smokers in the household     Alcohol use No        Medications:      albuterol (PROAIR HFA/PROVENTIL HFA/VENTOLIN HFA) 108 (90 BASE) MCG/ACT Inhaler   budesonide-formoterol (SYMBICORT) 160-4.5 MCG/ACT Inhaler   cetirizine (ZYRTEC ALLERGY) 10 MG tablet   citalopram (CELEXA) 10 MG tablet   hydrOXYzine (ATARAX) 25 MG tablet   mometasone (NASONEX) 50 MCG/ACT spray         Review of Systems   Gastrointestinal: Positive for diarrhea and vomiting.   Genitourinary: Positive for dysuria, vaginal discharge (That is foul-smelling) and vaginal pain.   All other systems reviewed  and are negative.      Physical Exam   Heart Rate: 77  Temp: 100  F (37.8  C)  Resp: 14  SpO2: 98 %      Physical Exam   Constitutional: No distress.   HENT:   Head: Normocephalic and atraumatic.   Mouth/Throat: Oropharynx is clear and moist. No oropharyngeal exudate.   Eyes: Pupils are equal, round, and reactive to light. No scleral icterus.   Neck: Normal range of motion.   Cardiovascular: Normal rate, regular rhythm, normal heart sounds and intact distal pulses.    No murmur heard.  Pulmonary/Chest: Breath sounds normal. No respiratory distress.   Abdominal: Soft. Bowel sounds are normal. She exhibits no distension. There is no tenderness.   Genitourinary: Vaginal discharge (yellow malodorous) found.   Genitourinary Comments: No CMT   Musculoskeletal: She exhibits no edema or tenderness.   Skin: Skin is warm. No rash noted. She is not diaphoretic.   Psychiatric: She has a normal mood and affect.       ED Course     ED Course     Procedures    Results for orders placed or performed during the hospital encounter of 07/29/18 (from the past 24 hour(s))   CBC with platelets differential   Result Value Ref Range    WBC 3.7 (L) 4.0 - 11.0 10e9/L    RBC Count 4.42 3.8 - 5.2 10e12/L    Hemoglobin 12.5 11.7 - 15.7 g/dL    Hematocrit 36.6 35.0 - 47.0 %    MCV 83 78 - 100 fl    MCH 28.3 26.5 - 33.0 pg    MCHC 34.2 31.5 - 36.5 g/dL    RDW 12.5 10.0 - 15.0 %    Platelet Count 243 150 - 450 10e9/L    Diff Method Automated Method     % Neutrophils 60.6 %    % Lymphocytes 20.9 %    % Monocytes 10.2 %    % Eosinophils 7.5 %    % Basophils 0.5 %    % Immature Granulocytes 0.3 %    Nucleated RBCs 0 0 /100    Absolute Neutrophil 2.3 1.6 - 8.3 10e9/L    Absolute Lymphocytes 0.8 0.8 - 5.3 10e9/L    Absolute Monocytes 0.4 0.0 - 1.3 10e9/L    Absolute Basophils 0.0 0.0 - 0.2 10e9/L    Abs Immature Granulocytes 0.0 0 - 0.4 10e9/L    Absolute Nucleated RBC 0.0    Lactic acid whole blood   Result Value Ref Range    Lactic Acid 0.5 (L) 0.7 -  2.0 mmol/L   Comprehensive metabolic panel   Result Value Ref Range    Sodium 140 133 - 144 mmol/L    Potassium 3.7 3.4 - 5.3 mmol/L    Chloride 108 94 - 109 mmol/L    Carbon Dioxide 22 20 - 32 mmol/L    Anion Gap 10 3 - 14 mmol/L    Glucose 81 70 - 99 mg/dL    Urea Nitrogen 7 7 - 30 mg/dL    Creatinine 0.67 0.52 - 1.04 mg/dL    GFR Estimate >90 >60 mL/min/1.7m2    GFR Estimate If Black >90 >60 mL/min/1.7m2    Calcium 8.1 (L) 8.5 - 10.1 mg/dL    Bilirubin Total 0.4 0.2 - 1.3 mg/dL    Albumin 3.0 (L) 3.4 - 5.0 g/dL    Protein Total 6.3 (L) 6.8 - 8.8 g/dL    Alkaline Phosphatase 50 40 - 150 U/L    ALT 16 0 - 50 U/L    AST 17 0 - 45 U/L   CRP inflammation   Result Value Ref Range    CRP Inflammation 29.2 (H) 0.0 - 8.0 mg/L   Wet prep   Result Value Ref Range    Specimen Description Vagina     Wet Prep No Trichomonas seen     Wet Prep Few  Clue cells seen   (A)     Wet Prep No yeast seen     Wet Prep Moderate  PMNs seen      UA with Microscopic   Result Value Ref Range    Color Urine Yellow     Appearance Urine Slightly Cloudy     Glucose Urine Negative NEG^Negative mg/dL    Bilirubin Urine Negative NEG^Negative    Ketones Urine Negative NEG^Negative mg/dL    Specific Gravity Urine 1.010 1.003 - 1.035    Blood Urine Small (A) NEG^Negative    pH Urine 6.0 5.0 - 7.0 pH    Protein Albumin Urine Negative NEG^Negative mg/dL    Urobilinogen mg/dL 4.0 (H) 0.0 - 2.0 mg/dL    Nitrite Urine Negative NEG^Negative    Leukocyte Esterase Urine Large (A) NEG^Negative    Source Catheterized Urine     WBC Urine 76 (H) 0 - 5 /HPF    RBC Urine 3 (H) 0 - 2 /HPF    WBC Clumps Present (A) NEG^Negative /HPF    Bacteria Urine Moderate (A) NEG^Negative /HPF    Squamous Epithelial /HPF Urine 7 (H) 0 - 1 /HPF    Mucous Urine Present (A) NEG^Negative /LPF    Amorphous Crystals Many (A) NEG^Negative /HPF   HCG qualitative urine (UPT)   Result Value Ref Range    HCG Qual Urine Negative NEG^Negative       Medications   ondansetron (ZOFRAN) injection  4 mg (not administered)   0.9% sodium chloride BOLUS (0 mLs Intravenous Stopped 7/29/18 1632)       Assessments & Plan (with Medical Decision Making)  Kena is a 23-year-old female who presents to the emergency department today with concerns of a fever that started yesterday.  Patient reports she had nausea and vomiting yesterday but that has resolved today.  Patient also endorses some thick yellow vaginal discharge and vaginal odor for the last week and is concerned with her recent fever that she may have an infection.  Patient is 4 weeks postpartum from a vaginal delivery, she does report sustaining a large tear that had to be repaired.  Patient does endorse some burning with urination that is intermittent.  She denies any back or flank pain.  Patient arrives here with a low-grade fever, she is otherwise hemodynamically stable.  Patient on exam is well hydrated she is nontoxic-appearing.  This may be a viral gastroenteritis versus endometritis given her complaints of odor and discharge.  Peripheral IV was established, patient was given a liter of normal saline.  Patient was given a dose of Zofran for nausea with resolution in her symptoms.  Pelvic exam done with RN at bedside, there is thick yellow discharge present, there is a foul odor present, vaginal laceration appears to have healed well.  Patient does not have any cervical motion tenderness on exam.    Blood work today is reassuring with a lactic acid of 0.5, white count is actually low at 3.7.  CMP returns with a calcium of 8.1 and total protein is 6.3 and is otherwise within the limits.  CRP is elevated at 29.2.  Wet prep returns with few clue cells, gonorrhea and chlamydia swabs are pending although I feel patient is low risk for this.  Urinalysis does return with large leukocyte esterase and 76 white cells with clumps of white cells, culture is pending.  Vaginal discharge may be affecting the UA results.  I discussed patient's workup today and exam  findings with OB/GYN on-call Dr. Tang.  He did recommend treating patient for endometritis, it is likely if this is what is going on it is mild and early in the course.  He did give an option to have patient wait for 24 hours to see if her fever resolved without antibiotic therapy which would be consistent with a viral gastroenteritis.  He would like to see patient in clinic on Tuesday for reevaluation.  I discussed recommendations test results and exam findings with patient.  Patient would like to go ahead and start antibiotics.  Patient does have a penicillin and sulfa for an allergy so I will start her on clindamycin and Flagyl per [facility] guide recommendations, I also discussed antibiotic choice with Dr. Bose who is in agreement.  Patient was encouraged to take a probiotic with these antibiotics especially in light of her recent nausea and vomiting.  Patient was encouraged to stay well-hydrated.  A work in message was left for patient to be seen on Tuesday in clinic.  Reasons to return to the emergency department were discussed in detail.  Patient is agreeable to plan of care and discharged in stable condition.     I have reviewed the nursing notes.    I have reviewed the findings, diagnosis, plan and need for follow up with the patient.    New Prescriptions    CLINDAMYCIN (CLEOCIN) 300 MG CAPSULE    Take 1 capsule (300 mg) by mouth 3 times daily for 7 days    METRONIDAZOLE (FLAGYL) 500 MG TABLET    Take 1 tablet (500 mg) by mouth 2 times daily for 7 days    ONDANSETRON (ZOFRAN ODT) 4 MG ODT TAB    Take 1 tablet (4 mg) by mouth every 8 hours as needed       Final diagnoses:   Endometritis following delivery - Possible, early, mild   Urinary tract infection in female   Bacterial vaginosis   Fever in adult     This document serves as a record of services personally performed by Michelle Millard AP*. It was created on their behalf by Wanda Shahdi, a trained medical scribe. The creation of this record is  based on the provider's personal observations and the statements of the patient. This document has been checked and approved by the attending provider.  Note: Chart documentation done in part with Dragon Voice Recognition software. Although reviewed after completion, some word and grammatical errors may remain.  7/29/2018   [facility] EMERGENCY DEPARTMENT     Michelle Millard APRN CNP  07/29/18 3675

## 2018-07-29 NOTE — ED AVS SNAPSHOT
" [facility] Emergency Department    [address] [place] [address]    Phone:  [phone]    Fax:  [phone]                                       Kena Sorto   MRN: [id number]    Department:  [facility] Emergency Department   Date of Visit:  7/29/2018           Patient Information     Date Of Birth          1995        Your diagnoses for this visit were:     Endometritis following delivery Possible, early, mild    Urinary tract infection in female     Bacterial vaginosis     Fever in adult        You were seen by Michelle Millard, HILDA NOLEN.      Follow-up Information     Follow up with Lance Tang MD.    Specialty:  Family Practice    Why:  Tuesday    Contact information:    [address] [place] [address] [phone]          Follow up with [facility] Emergency Department.    Specialty:  EMERGENCY MEDICINE    Why:  If symptoms worsen    Contact information:    [address] [place] [address] [phone]    Additional information:    From [address]: Exit at [address] on [address] side of [place]. Turn right on [address]. Turn left at stoplight on [address]. [facility] will be in view two blocks ahead        Discharge Instructions          * BLADDER INFECTION,Female (Adult)    A bladder infection (\"cystitis\" or \"UTI\") usually causes a constant urge to urinate and a burning when passing urine. Urine may be cloudy, smelly or dark. There may be pain in the lower abdomen. A bladder infection occurs when bacteria from the vaginal area enter the bladder opening (urethra). This can occur from sexual intercourse, wearing tight clothing, dehydration and other factors.  HOME CARE:  1. Drink lots of fluids (at least 6-8 glasses a day, unless you must restrict fluids for other medical reasons). This will force the medicine into your urinary system and flush the bacteria out of your body. Cranberry juice has been shown " to help clear out the bacteria.  2. Avoid sexual intercourse until your symptoms are gone.  3. A bladder infection is treated with antibiotics. You may also be given Pyridium (generic = phenazopyridine) to reduce the burning sensation. This medicine will cause your urine to become a bright orange color. The orange urine may stain clothing. You may wear a pad or panty-liner to protect clothing.  PREVENTING FUTURE INFECTIONS:  1. Always wipe from front to back after a bowel movement.  2. Keep the genital area clean and dry.  3. Drink plenty of fluids each day to avoid dehydration.  4. Urinate right after intercourse to flush out the bladder.  5. Wear cotton underwear and cotton-lined panty hose; avoid tight-fitting pants.  6. If you are on birth control pills and are having frequent bladder infections, discuss with your doctor.  FOLLOW UP: Return to this facility or see your doctor if ALL symptoms are not gone after three days of treatment.  GET PROMPT MEDICAL ATTENTION if any of the following occur:    Fever over 101 F (38.3 C)    No improvement by the third day of treatment    Increasing back or abdominal pain    Repeated vomiting; unable to keep medicine down    Weakness, dizziness or fainting    Vaginal discharge    Pain, redness or swelling in the labia (outer vaginal area)    3855-0054 [employer]. [address] [place] [address], [place] [address]. All rights reserved. This information is not intended as a substitute for professional medical care. Always follow your healthcare professional's instructions.  This information has been modified by your health care provider with permission from the publisher.    Bacterial Vaginosis    You have a vaginal infection called bacterial vaginosis (BV). Both good and bad bacteria are present in a healthy vagina. BV occurs when these bacteria get out of balance. The number of bad bacteria increase. And the number of good bacteria decrease. Although BV is associated with  sexual activity, it is not a sexually transmitted disease.  BV may or may not cause symptoms. If symptoms do occur, they can include:    Thin, gray, milky-white, or sometimes green discharge    Unpleasant odor or  fishy  smell    Itching, burning, or pain in or around the vagina  It is not known what causes BV, but certain factors can make the problem more likely. This can include:    Douching    Having sex with a new partner    Having sex with more than one partner  BV will sometimes go away on its own. But treatment is usually recommended. This is because untreated BV can increase the risk of more serious health problems such as:    Pelvic inflammatory disease (PID)     delivery (giving birth to a baby early if you re pregnant)    HIV and certain other sexually transmitted diseases (STDs)    Infection after surgery on the reproductive organs  Home care  General care    BV is most often treated with medicines called antibiotics. These may be given as pills or as a vaginal cream. If antibiotics are prescribed, be sure to use them exactly as directed. Also, be sure to complete all of the medicine, even if your symptoms go away.    Don't douche or having sex during treatment.    If you have sex with a female partner, ask your healthcare provider if she should also be treated.  Prevention    Don't douche.    Don't have sex. If you do have sex, then take steps to lower your risk:  ? Use condoms when having sex.  ? Limit the number of sexual partners you have.  Follow-up care  Follow up with your healthcare provider, or as advised.  When to seek medical advice  Call your healthcare provider right away if:    You have a fever of 100.4 F (38 C) or higher, or as directed by your provider.    Your symptoms worsen, or they don t go away within a few days of starting treatment.    You have new pain in the lower belly or pelvic region.    You have side effects that bother you or a reaction to the pills or cream you re  prescribed.    You or any partners you have sex with have new symptoms, such as a rash, joint pain, or sores.  Date Last Reviewed: 10/1/2017    0961-0038 [employer]. [address] [place] [address], [place] [address]. All rights reserved. This information is not intended as a substitute for professional medical care. Always follow your healthcare professional's instructions.        Obstetric Endometritis    You have endometritis. This means the lining of the uterus is inflamed. It is usually caused by an infection.   These are common symptoms of endometritis:    Fever    Feeling ill    Pain in the lower belly (abdomen)    Abnormal bleeding from the vagina    Bad-smelling fluid from the vagina    Pain when urinating    Pain during sex    Headache    Fatigue    Loss of appetite  Endometritis can be caused by:    Vaginal delivery or     Long labor    Miscarriage        Tissue from the placenta that stays in the uterus after delivery    Vaginal infection  In some cases, an infection called pelvic inflammatory disease (PID) also happens. This affects the ovaries and fallopian tubes. If not treated, PID may lead to infertility. It can also lead to full-body infection (sepsis).  Home care  You may be given antibiotic medicine to take for the infection. Your symptoms should get better within 2 to 3 days of starting the antibiotics. Take the antibiotics until they are used up. It s important to finish the antibiotics even if you feel better. This is to make sure the infection has cleared up.  General care  You can take acetaminophen or ibuprofen for pain, unless you were given a different pain medicine to use. If you have chronic liver or kidney disease, talk with your healthcare provider before using these medicines. Also talk with your provider if you ve had a stomach ulcer or GI bleeding or are taking blood thinner medicine.    Plan to rest at home for the rest of the day.    Until you have taken  all the antibiotics and your symptoms are gone:  ? Don t have sex until your provider says it is OK.  ? Don t put anything into your vagina, including tampons.  ? Don t douche. Douching is generally not recommended at any time.  ? Don t take a tub bath, use a hot tub, or swim. It s fine to shower.  Follow-up care  Follow up with your healthcare provider, or as advised. You may need to have a procedure to clear tissue out of the uterus. This is called dilation and curettage (D&C). Your provider can tell you more about this.  Call 911  Call 911 if any of these occur:    Severe pain and very heavy bleeding    Severe lightheadedness, passing out, or fainting    Rapid heart rate    Trouble breathing    Confusion or difficulty waking up  When to seek medical advice  Call your healthcare provider right away if any of these occur:    Fever of 100.4 F (38 C) or higher, or as directed by your healthcare provider    Symptoms get worse    You have new symptoms    Nausea or vomiting    Your symptoms don t improve within 3 days of starting treatment  Date Last Reviewed: 10/1/2016    4069-0994 [employer]. [address] [place] [address]. All rights reserved. This information is not intended as a substitute for professional medical care. Always follow your healthcare professional's instructions.          Your next 10 appointments already scheduled     Aug 13, 2018 11:00 AM CDT   Post Partum with Darcy Duarte PA-C   [facility] ([address] [address] [phone]            Aug 29, 2018  8:45 AM CDT   Office Visit with PEPPER Chaudhry[address] [address]   [phone]           Bring a current list of meds and any records pertaining to this visit. For Physicals, please bring immunization records and any forms needing to be filled out.  Please arrive 10 minutes early to complete paperwork.              24 Hour Appointment Hotline       To make an appointment at any [facility], call [phone] ([phone]). If you don't have a family doctor or clinic, we will help you find one. [facility] clinics are conveniently located to serve the needs of you and your family.             Review of your medicines      START taking        Dose / Directions Last dose taken    clindamycin 300 MG capsule   Commonly known as:  CLEOCIN   Dose:  300 mg   Quantity:  21 capsule        Take 1 capsule (300 mg) by mouth 3 times daily for 7 days   Refills:  0        metroNIDAZOLE 500 MG tablet   Commonly known as:  FLAGYL   Dose:  500 mg   Quantity:  14 tablet        Take 1 tablet (500 mg) by mouth 2 times daily for 7 days   Refills:  1        ondansetron 4 MG ODT tab   Commonly known as:  ZOFRAN ODT   Dose:  4 mg   Quantity:  20 tablet        Take 1 tablet (4 mg) by mouth every 8 hours as needed   Refills:  0          Our records show that you are taking the medicines listed below. If these are incorrect, please call your family doctor or clinic.        Dose / Directions Last dose taken    albuterol 108 (90 Base) MCG/ACT Inhaler   Commonly known as:  PROAIR HFA/PROVENTIL HFA/VENTOLIN HFA   Dose:  2 puff   Quantity:  2 Inhaler        Inhale 2 puffs into the lungs every 6 hours as needed for shortness of breath / dyspnea or wheezing   Refills:  3        budesonide-formoterol 160-4.5 MCG/ACT Inhaler   Commonly known as:  SYMBICORT   Dose:  2 puff   Quantity:  3 Inhaler        Inhale 2 puffs into the lungs 2 times daily   Refills:  3        cetirizine 10 MG tablet   Commonly known as:  ZYRTEC ALLERGY   Dose:  10 mg   Quantity:  90 tablet        Take 1 tablet (10 mg) by mouth daily   Refills:  3        citalopram 10 MG tablet   Commonly known as:  celeXA   Dose:  10 mg   Quantity:  90 tablet        Take 1 tablet (10 mg) by mouth daily   Refills:  1        hydrOXYzine  25 MG tablet   Commonly known as:  ATARAX   Quantity:  90 tablet        TAKE ONE TO TWO TABLETS BY MOUTH EVERY 4 HOURS AS NEEDED FOR ITCHING   Refills:  1        mometasone 50 MCG/ACT spray   Commonly known as:  NASONEX   Dose:  2 spray   Quantity:  3 Box        Spray 2 sprays into both nostrils daily   Refills:  3                Prescriptions were sent or printed at these locations (3 Prescriptions)                   [facility] - [address]   [address] [place] [address]    Telephone:  [phone]   Fax:  [phone]   Hours:                  E-Prescribed (3 of 3)         clindamycin (CLEOCIN) 300 MG capsule               metroNIDAZOLE (FLAGYL) 500 MG tablet               ondansetron (ZOFRAN ODT) 4 MG ODT tab                Procedures and tests performed during your visit     CBC with platelets differential    CHLAMYDIA TRACHOMATIS PCR    CRP inflammation    Comprehensive metabolic panel    HCG qualitative urine (UPT)    Lactic acid whole blood    NEISSERIA GONORRHOEA PCR    Peripheral IV catheter    UA with Microscopic    Urine Culture    Wet prep      Orders Needing Specimen Collection     None      Pending Results     Date and Time Order Name Status Description    7/29/2018 1654 Urine Culture In process     7/29/2018 1525 CHLAMYDIA TRACHOMATIS PCR In process     7/29/2018 1525 NEISSERIA GONORRHOEA PCR In process             Pending Culture Results     Date and Time Order Name Status Description    7/29/2018 1654 Urine Culture In process     7/29/2018 1525 CHLAMYDIA TRACHOMATIS PCR In process     7/29/2018 1525 NEISSERIA GONORRHOEA PCR In process             Pending Results Instructions     If you had any lab results that were not finalized at the time of your Discharge, you can call the ED Lab Result RN at [phone]. You will be contacted by this team for any positive Lab results or changes in treatment. The nurses are available 7 days a week from [facility]  to 6:30P.  You can leave a message 24 hours per day and they will return your call.        Thank you for choosing [facility]       Thank you for choosing [facility] for your care. Our goal is always to provide you with excellent care. Hearing back from our patients is one way we can continue to improve our services. Please take a few minutes to complete the written survey that you may receive in the mail after you visit with us. Thank you!        [employer] Information     [employer] gives you secure access to your electronic health record. If you see a primary care provider, you can also send messages to your care team and make appointments. If you have questions, please call your primary care clinic.  If you do not have a primary care provider, please call [phone] and they will assist you.        Care EveryWhere ID     This is your Care EveryWhere ID. This could be used by other organizations to access your [facility] medical records  [id number]        Equal Access to Services     JOAQUINA MOORE : Ac Johnson, pat son, lesa wood, nino doll . So [facility] -[phone].    ATENCIÓN: Si habla español, tiene a bernal disposición servicios gratuitos de asistencia lingüística. Llame al [phone].    We comply with applicable federal civil rights laws and [place] laws. We do not discriminate on the basis of race, color, national origin, age, disability, sex, sexual orientation, or gender identity.            After Visit Summary       This is your record. Keep this with you and show to your community pharmacist(s) and doctor(s) at your next visit.

## 2018-07-29 NOTE — ED TRIAGE NOTES
"Here with fever, nausea, and intermittent emesis. She is currently four weeks postpartum with a history of significant vaginal tear. \"I just want to make sure there is no infection\"  "

## 2018-07-30 ENCOUNTER — TELEPHONE (OUTPATIENT)
Dept: EMERGENCY MEDICINE | Facility: CLINIC | Age: 23
End: 2018-07-30

## 2018-07-30 DIAGNOSIS — A74.9 CHLAMYDIA INFECTION: ICD-10-CM

## 2018-07-30 DIAGNOSIS — Z91.89 AT RISK FOR NAUSEA: ICD-10-CM

## 2018-07-30 LAB
BACTERIA SPEC CULT: NORMAL
BACTERIA SPEC CULT: NORMAL
C TRACH DNA SPEC QL NAA+PROBE: POSITIVE
Lab: NORMAL
N GONORRHOEA DNA SPEC QL NAA+PROBE: NEGATIVE
SPECIMEN SOURCE: ABNORMAL
SPECIMEN SOURCE: NORMAL
SPECIMEN SOURCE: NORMAL

## 2018-07-30 RX ORDER — ONDANSETRON 4 MG/1
4 TABLET, ORALLY DISINTEGRATING ORAL ONCE
Qty: 1 TABLET | Refills: 0 | Status: CANCELLED | OUTPATIENT
Start: 2018-07-30 | End: 2018-07-30

## 2018-07-30 RX ORDER — AZITHROMYCIN 500 MG/1
1000 TABLET, FILM COATED ORAL ONCE
Qty: 2 TABLET | Refills: 0 | Status: SHIPPED | OUTPATIENT
Start: 2018-07-30 | End: 2018-07-30

## 2018-07-30 NOTE — TELEPHONE ENCOUNTER
Patient called not sure where Dr. Tang would like her put tomorrow since there are no openings. Will route to PCP basket for Dr. Pena nurse to call in AM. Patient is okay with waiting until tomorrow am.   Margoth Lopez MA

## 2018-07-30 NOTE — TELEPHONE ENCOUNTER
Kena returns call.  She was notified of positive Chlamydia result and treatment recommendations  Current symptoms:  No fever today.  Denies nausea and vomiting today.    Has not had sexual relations since childbirth.    Azithromycin 1000 mg PO x 1 to be prescribed.  (Through Epic, when signing Rx, High Risk warning interaction between Celexa and Azithromycin.  RN reviewed this warning with Dr Herron, [facility] ED provider, and he approved of the 1 time dose of Azithromycin.).  Rx sent to [facility].    STD Patient Instructions Reviewed with Kena:    We recommend that you contact any recent sexual partners within the last 2 months and have them evaluated by a physician.    Avoid sexual activity for 7 to 10 days or until both your and your partner(s) have completed all antibiotic medications.    We advise that you consider following up with your PCP at approximately 3 months for retesting to be sure the infection has cleared.    Per ED visit on 7/29/18, she was directed to f/u with Dr Tang on Tuesday.  Kena states she is awaiting call back from clinic with appt information.    Maximiliano Perez RN  [facility] RN  Lung Nodule and ED Lab Result RN  Epic pool (ED late result f/u RN): [id number]  [facility] INCIDENTAL RADIOLOGY F/U NURSES: P [id number]  # [phone]

## 2018-07-30 NOTE — TELEPHONE ENCOUNTER
[facility] Emergency Department Lab result notification [Adult-Female]    [facility] ED lab result protocol used  Chlamydia protocol    Reason for call  Notify of lab results, assess symptoms,  review ED providers recommendations/discharge instructions (if necessary) and advise per ED lab result f/u protocol    Lab Result (including Rx patient on, if applicable)  Final Chlamydia trachomatis PCR on 7/30/18 is POSITIVE for C. trachomatis rRNA by transcription mediated amplification.  Patient was treated appropriately in the ED [Yes or No]:   No         [facility] ED discharge antibiotic (if prescribed): Clindamycin and Flagyl  If no treatment initiated in the [facility] ED, treat per [facility] ED Lab Result protocol.    Information table from ED Provider visit on 7/29/18  ED diagnosis Endometritis following delivery - Possible, early, mild   Urinary tract infection in female   Bacterial vaginosis   Fever in adult      ED provider Michelle Millard APRN CNP   Symptoms reported at ED visit (Chief complaint, HPI) 23 year old female who presents to the emergency department with concerns of fever that developed today.  Patient reports that she gave birth 4 weeks ago and did tear substantially, the tearing was repaired.  Yesterday she states that she had some vomiting and diarrhea throughout the day.  This morning she felt back to normal, but 1-2 hours ago she noticed that she had flushed cheeks so she took her temperature.  At this time her temperature was 101.5.  She states that she is concerned she may have a possible infection after giving birth.  She does have constant yellow vaginal discharge that is foul smelling for the last 2-3 weeks.  She has some intermittent dysuria.  Patient has vaginal pain that is described as cramping/tingling.  She has taken 600 mg of ibuprofen to lower her temperature.  She denies any cough or cold symptoms.   ED providers Impression and Plan (applicable information) 23-year-old  female who presents to the emergency department today with concerns of a fever that started yesterday.  Patient reports she had nausea and vomiting yesterday but that has resolved today.  Patient also endorses some thick yellow vaginal discharge and vaginal odor for the last week and is concerned with her recent fever that she may have an infection.  Patient is 4 weeks postpartum from a vaginal delivery, she does report sustaining a large tear that had to be repaired.  Patient does endorse some burning with urination that is intermittent.  She denies any back or flank pain.  Patient arrives here with a low-grade fever, she is otherwise hemodynamically stable.  Patient on exam is well hydrated she is nontoxic-appearing.  This may be a viral gastroenteritis versus endometritis given her complaints of odor and discharge.  Peripheral IV was established, patient was given a liter of normal saline.  Patient was given a dose of Zofran for nausea with resolution in her symptoms.  Pelvic exam done with RN at bedside, there is thick yellow discharge present, there is a foul odor present, vaginal laceration appears to have healed well.  Patient does not have any cervical motion tenderness on exam.    Blood work today is reassuring with a lactic acid of 0.5, white count is actually low at 3.7.  CMP returns with a calcium of 8.1 and total protein is 6.3 and is otherwise within the limits.  CRP is elevated at 29.2.  Wet prep returns with few clue cells, gonorrhea and chlamydia swabs are pending although I feel patient is low risk for this.  Urinalysis does return with large leukocyte esterase and 76 white cells with clumps of white cells, culture is pending.  Vaginal discharge may be affecting the UA results.  I discussed patient's workup today and exam findings with OB/GYN on-call Dr. Tang.  He did recommend treating patient for endometritis, it is likely if this is what is going on it is mild and early in the course.  He  did give an option to have patient wait for 24 hours to see if her fever resolved without antibiotic therapy which would be consistent with a viral gastroenteritis.  He would like to see patient in clinic on Tuesday for reevaluation.  I discussed recommendations test results and exam findings with patient.  Patient would like to go ahead and start antibiotics.  Patient does have a penicillin and sulfa for an allergy so I will start her on clindamycin and Flagyl per [facility] guide recommendations, I also discussed antibiotic choice with Dr. Bose who is in agreement.  Patient was encouraged to take a probiotic with these antibiotics especially in light of her recent nausea and vomiting.  Patient was encouraged to stay well-hydrated.  A work in message was left for patient to be seen on Tuesday in clinic.  Reasons to return to the emergency department were discussed in detail.  Patient is agreeable to plan of care and discharged in stable condition.   Significant Medical hx, if applicable NA   Coumadin/Warfarin [Yes /No] No   Creatinine Level (mg/dl) NA   Creatinine clearance (ml/min), if applicable NA   Pregnant (Yes/No/NA) Recent childbirth (4 weeks ago)   Breastfeeding (Yes/No/NA) ?    Allergies Keflex (Cephalexin); Penicillins; Prozac   Weight, if applicable NA      RN Assessment (Patient s current Symptoms), include time called.  [Insert Left message here if message left]  10:59A Left voicemail message requesting a call back to [phone] between 10 a.m. and 6:30 p.m., 7 days a week for patient's ED/UC lab results.  May leave a message 24/7, if no one available.       [RN Name]  Maximiliano Perez RN  [facility] RN  Lung Nodule and ED Lab Result RN  Epic pool (ED late result f/u RN): [id number]  [facility] INCIDENTAL RADIOLOGY F/U NURSES: [id number]  Ph# [phone]    Copy of Lab result   CHLAMYDIA TRACHOMATIS PCR [[id number]] (Order [id number])   Exam Information   Exam Date Exam Time Accession # Results     7/29/18  3:45 PM [id number]    Component Results   Component Value Flag Ref Range Units Status Collected Lab   Specimen Description Vagina    Final 07/29/2018  3:45 PM [facility] Lab   Chlamydia Trachomatis PCR Positive (A) NEG^Negative  Final 07/29/2018  3:45 PM 75   Comment:   Positive for C. trachomatis rRNA by transcription mediated amplification.   As is true for all non-culture methods, a positive specimen obtained from a patient after therapeutic treatment cannot be interpreted as indicating the presence of viable C. trachomatis.   Critical Value/Significant Value called to and read back by   Maximiliano Perez RN at 1042 on 7.30.18. hd

## 2018-07-31 ENCOUNTER — OFFICE VISIT (OUTPATIENT)
Dept: FAMILY MEDICINE | Facility: CLINIC | Age: 23
End: 2018-07-31
Payer: COMMERCIAL

## 2018-07-31 VITALS
DIASTOLIC BLOOD PRESSURE: 76 MMHG | HEART RATE: 114 BPM | TEMPERATURE: 97.2 F | WEIGHT: 206.4 LBS | OXYGEN SATURATION: 98 % | BODY MASS INDEX: 35.43 KG/M2 | SYSTOLIC BLOOD PRESSURE: 122 MMHG

## 2018-07-31 DIAGNOSIS — N71.9 ENDOMETRITIS: Primary | ICD-10-CM

## 2018-07-31 PROCEDURE — 99213 OFFICE O/P EST LOW 20 MIN: CPT | Performed by: FAMILY MEDICINE

## 2018-07-31 ASSESSMENT — PAIN SCALES - GENERAL: PAINLEVEL: NO PAIN (0)

## 2018-07-31 NOTE — NURSING NOTE
There are no preventive care reminders to display for this patient.    Health Maintenance reviewed at today's visit patient asked to schedule/complete:   Patient is not due for any HM issues.    Ameena Vaca, CMA

## 2018-07-31 NOTE — PROGRESS NOTES
SUBJECTIVE:                                                      Chief Complaint   Patient presents with     ER F/U        ED/UC Followup:    Facility:  [facility]  Date of visit: 07/29/2018  Reason for visit: endometritis  Current Status: no current fevers, and pain has improved a lot, slight pain still noted.          Kena is a 23 year old comes in for an ER follow-up.  She was seen and had been febrile.  No fundal pain.  Recently delivered.  No other reasonable source of her fevers.  She was complaining of increased vaginal discharge.    ROS:  Constitutional, HEENT, cardiovascular, pulmonary, gi and gu systems are negative, except as otherwise noted.    OBJECTIVE:                                                    /76 (BP Location: Left arm, Patient Position: Chair, Cuff Size: Adult Regular)  Pulse 114  Temp 97.2  F (36.2  C) (Temporal)  Wt 206 lb 6.4 oz (93.6 kg)  LMP 10/02/2017  SpO2 98%  BMI 35.43 kg/m2  Body mass index is 35.43 kg/(m^2).    Well-appearing.  External exam shows well-healing vaginal tissue.  No fundal tenderness.  No odor, or purulent appearing discharge.  Appears to be normal physiologic vaginal discharge.    Diagnostic Test Results:  none      ASSESSMENT/PLAN:                                                        ICD-10-CM    1. Endometritis N71.9        Appears to be resolving.  Finish her antibiotics.  She is on clindamycin and Flagyl.  Return if fevers recur or she has abdominal pain, or any other signs of worsening.  She agrees.    Lance Tang MD  [facility]

## 2018-07-31 NOTE — MR AVS SNAPSHOT
After Visit Summary   7/31/2018    Kena Sorto    MRN: [id number]           Patient Information     Date Of Birth          1995        Visit Information        Provider Department      7/31/2018 1:00 PM Lance Tang MD [facility]        Today's Diagnoses     Endometritis    -  1       Follow-ups after your visit        Your next 10 appointments already scheduled     Aug 13, 2018 11:00 AM CDT   Post Partum with Darcy Duarte PA-C   [facility] ([facility]    [address] [place] [id number] [phone]            Aug 29, 2018  8:45 AM CDT   Office Visit with Darcy Duarte PA-C   [facility] ([facility])    [address] [place] [id number] [phone]           Bring a current list of meds and any records pertaining to this visit. For Physicals, please bring immunization records and any forms needing to be filled out. Please arrive 10 minutes early to complete paperwork.              Who to contact     If you have questions or need follow up information about today's clinic visit or your schedule please contact [facility] directly at [phone].  Normal or non-critical lab and imaging results will be communicated to you by MyChart, letter or phone within 4 business days after the clinic has received the results. If you do not hear from us within 7 days, please contact the clinic through [email or web address] or phone. If you have a critical or abnormal lab result, we will notify you by phone as soon as possible.  Submit refill requests through PredictAd or call your pharmacy and they will forward the refill request to us. Please allow 3 business days for your refill to be completed.          Additional Information About Your Visit        [email or web address] Information     PredictAd gives you secure access to your electronic health record. If you see a primary care provider, you can  also send messages to your care team and make appointments. If you have questions, please call your primary care clinic.  If you do not have a primary care provider, please call [phone] and they will assist you.        Care EveryWhere ID     This is your Care EveryWhere ID. This could be used by other organizations to access your [facility] medical records  [id number]        Your Vitals Were     Pulse Temperature Last Period Pulse Oximetry BMI (Body Mass Index)       114 97.2  F (36.2  C) (Temporal) 10/02/2017 98% 35.43 kg/m2        Blood Pressure from Last 3 Encounters:   07/31/18 122/76   07/29/18 136/78   07/26/18 120/80    Weight from Last 3 Encounters:   07/31/18 206 lb 6.4 oz (93.6 kg)   07/26/18 208 lb (94.3 kg)   07/06/18 200 lb (90.7 kg)              Today, you had the following     No orders found for display       Primary Care Provider Office Phone # Fax #    Darcy Duarte PA-C [phone] [address] [place] [address]        Equal Access to Services     [facility] : Hadii aad ku hadasho Soomaali, waaxda luqadaha, qaybta kaalmada adeegyada, nino decker hayrajiv doll . So [facility] [phone].    ATENCIÓN: Si habla español, tiene a bernal disposición servicios gratuitos de asistencia lingüística. [facility] [phone].    We comply with applicable federal civil rights laws and [place] laws. We do not discriminate on the basis of race, color, national origin, age, disability, sex, sexual orientation, or gender identity.            Thank you!     Thank you for choosing [facility]  for your care. Our goal is always to provide you with excellent care. Hearing back from our patients is one way we can continue to improve our services. Please take a few minutes to complete the written survey that you may receive in the mail after your visit with us. Thank you!             Your Updated Medication List - Protect others around you: Learn how to safely  use, store and throw away your medicines at www.disposemymeds.org.          This list is accurate as of 7/31/18 11:59 PM.  Always use your most recent med list.                   Brand Name Dispense Instructions for use Diagnosis    albuterol 108 (90 Base) MCG/ACT Inhaler    PROAIR HFA/PROVENTIL HFA/VENTOLIN HFA    2 Inhaler    Inhale 2 puffs into the lungs every 6 hours as needed for shortness of breath / dyspnea or wheezing    Mild persistent asthma without complication       budesonide-formoterol 160-4.5 MCG/ACT Inhaler    SYMBICORT    3 Inhaler    Inhale 2 puffs into the lungs 2 times daily    Mild persistent asthma without complication       cetirizine 10 MG tablet    ZYRTEC ALLERGY    90 tablet    Take 1 tablet (10 mg) by mouth daily    Seasonal allergic rhinitis, unspecified chronicity, unspecified trigger       citalopram 10 MG tablet    celeXA    90 tablet    Take 1 tablet (10 mg) by mouth daily    Post partum depression       clindamycin 300 MG capsule    CLEOCIN    21 capsule    Take 1 capsule (300 mg) by mouth 3 times daily for 7 days        hydrOXYzine 25 MG tablet    ATARAX    90 tablet    TAKE ONE TO TWO TABLETS BY MOUTH EVERY 4 HOURS AS NEEDED FOR ITCHING    Rash and nonspecific skin eruption       metroNIDAZOLE 500 MG tablet    FLAGYL    14 tablet    Take 1 tablet (500 mg) by mouth 2 times daily for 7 days        mometasone 50 MCG/ACT spray    NASONEX    3 Box    Spray 2 sprays into both nostrils daily    Seasonal allergic rhinitis, unspecified chronicity, unspecified trigger       ondansetron 4 MG ODT tab    ZOFRAN ODT    20 tablet    Take 1 tablet (4 mg) by mouth every 8 hours as needed

## 2018-08-07 ENCOUNTER — TELEPHONE (OUTPATIENT)
Dept: FAMILY MEDICINE | Facility: CLINIC | Age: 23
End: 2018-08-07

## 2018-08-07 NOTE — TELEPHONE ENCOUNTER
Patient called back and has already been treated.  No further action Is needed as of right now.     Ameena Vaca, CMA

## 2018-08-07 NOTE — TELEPHONE ENCOUNTER
----- Message from Lance Tang MD sent at 8/3/2018  3:43 PM CDT -----  Please call. We should treat her partner as well. Same Rx.     Thanks      ----- Message -----     From: Maximiliano Perez RN     Sent: 7/30/2018  11:46 AM       To: Lance Tang MD    Kena notified of results and treatment recommendations.  Azithromycin 1000 mg PO x 1 to be prescribed.  (Through Epic, when signing Rx, High Risk warning interaction between Celexa and Azithromycin.  RN reviewed this warning with Dr Herron, [facility] ED provider, and he approved of the 1 time dose of Azithromycin.).  Rx sent to [facility].    She denies having any sexual relations since childbirth.  Information routed to Dr Tang

## 2018-08-16 ENCOUNTER — MYC MEDICAL ADVICE (OUTPATIENT)
Dept: FAMILY MEDICINE | Facility: CLINIC | Age: 23
End: 2018-08-16

## 2018-08-16 ENCOUNTER — TELEPHONE (OUTPATIENT)
Dept: FAMILY MEDICINE | Facility: CLINIC | Age: 23
End: 2018-08-16

## 2018-08-16 NOTE — TELEPHONE ENCOUNTER
----- Message from Kena Sorto sent at 8/16/2018  6:55 AM CDT -----  Regarding: Appointment Request  Contact: [phone]  Appointment Request From: Kena Sorto    With Provider: Darcy Duarte PA-C [-Primary Care Physician-]    Preferred Date Range: Any date 8/16/2018 or later    Preferred Times: Monday Morning, Tuesday Morning, Wednesday Morning, Thursday Morning, Friday Morning    Reason for visit: Request an Appointment    Comments:  Check up

## 2018-08-16 NOTE — TELEPHONE ENCOUNTER
Called pt and left a msg that we can't see her until 8/23 at 11:45 if still available when we hear back from her otherwise it won't be until 8/27. Ok to use 30 minute dr, only or our hold times. It's for a post partum check. I will also my beryl the pt.  Chantal Bland MA

## 2018-08-23 ENCOUNTER — PRENATAL OFFICE VISIT (OUTPATIENT)
Dept: FAMILY MEDICINE | Facility: CLINIC | Age: 23
End: 2018-08-23
Payer: COMMERCIAL

## 2018-08-23 ENCOUNTER — DOCUMENTATION ONLY (OUTPATIENT)
Dept: FAMILY MEDICINE | Facility: CLINIC | Age: 23
End: 2018-08-23

## 2018-08-23 VITALS
BODY MASS INDEX: 36.22 KG/M2 | HEART RATE: 90 BPM | TEMPERATURE: 96.8 F | WEIGHT: 211 LBS | SYSTOLIC BLOOD PRESSURE: 118 MMHG | DIASTOLIC BLOOD PRESSURE: 80 MMHG | OXYGEN SATURATION: 98 % | RESPIRATION RATE: 18 BRPM

## 2018-08-23 DIAGNOSIS — R20.2 LEFT HAND PARESTHESIA: ICD-10-CM

## 2018-08-23 DIAGNOSIS — R21 RASH AND NONSPECIFIC SKIN ERUPTION: ICD-10-CM

## 2018-08-23 PROCEDURE — 99207 ZZC POST PARTUM EXAM: CPT | Performed by: PHYSICIAN ASSISTANT

## 2018-08-23 PROCEDURE — 99213 OFFICE O/P EST LOW 20 MIN: CPT | Performed by: PHYSICIAN ASSISTANT

## 2018-08-23 RX ORDER — SULFAMETHOXAZOLE/TRIMETHOPRIM 800-160 MG
1 TABLET ORAL 2 TIMES DAILY
Qty: 20 TABLET | Refills: 0 | Status: SHIPPED | OUTPATIENT
Start: 2018-08-23 | End: 2018-09-21

## 2018-08-23 RX ORDER — HYDROXYZINE HYDROCHLORIDE 25 MG/1
TABLET, FILM COATED ORAL
Qty: 90 TABLET | Refills: 1 | Status: SHIPPED | OUTPATIENT
Start: 2018-08-23 | End: 2020-02-05

## 2018-08-23 RX ORDER — PREDNISONE 20 MG/1
40 TABLET ORAL DAILY
Qty: 10 TABLET | Refills: 0 | Status: SHIPPED | OUTPATIENT
Start: 2018-08-23 | End: 2018-08-28

## 2018-08-23 ASSESSMENT — PAIN SCALES - GENERAL: PAINLEVEL: NO PAIN (0)

## 2018-08-23 NOTE — PROGRESS NOTES
Patient did not show up for lab. Orders were canceled and reordered as future for 1 week.  Please contact patient to come back in.  Thanks, Rika Carver

## 2018-08-23 NOTE — MR AVS SNAPSHOT
After Visit Summary   8/23/2018    Kena Sorto    MRN: [id number]           Patient Information     Date Of Birth          1995        Visit Information        Provider Department      8/23/2018 11:45 AM Darcy Duarte PA-C [facility]        Today's Diagnoses     Routine postpartum follow-up    -  1    Rash and nonspecific skin eruption        Left hand paresthesia-4th finger           Follow-ups after your visit        Your next 10 appointments already scheduled     Aug 29, 2018  8:45 AM CDT   Office Visit with Darcy Duarte PA-C   [facility] ([facility]    [address] [place] [id number] [phone]           Bring a current list of meds and any records pertaining to this visit. For Physicals, please bring immunization records and any forms needing to be filled out. Please arrive 10 minutes early to complete paperwork.              Who to contact     If you have questions or need follow up information about today's clinic visit or your schedule please contact [facility] directly at [phone].  Normal or non-critical lab and imaging results will be communicated to you by MyChart, letter or phone within 4 business days after the clinic has received the results. If you do not hear from us within 7 days, please contact the clinic through BuildingLayerhart or phone. If you have a critical or abnormal lab result, we will notify you by phone as soon as possible.  Submit refill requests through DineGasm or call your pharmacy and they will forward the refill request to us. Please allow 3 business days for your refill to be completed.          Additional Information About Your Visit        MyChart Information     DineGasm gives you secure access to your electronic health record. If you see a primary care provider, you can also send messages to your care team and make appointments. If you have questions, please call your  primary care clinic.  If you do not have a primary care provider, please call [phone] and they will assist you.        Care EveryWhere ID     This is your Care EveryWhere ID. This could be used by other organizations to access your [facility] medical records  [id number]        Your Vitals Were     Pulse Temperature Respirations Pulse Oximetry BMI (Body Mass Index)       90 96.8  F (36  C) (Temporal) 18 98% 36.22 kg/m2        Blood Pressure from Last 3 Encounters:   08/23/18 118/80   07/31/18 122/76   07/29/18 136/78    Weight from Last 3 Encounters:   08/23/18 211 lb (95.7 kg)   07/31/18 206 lb 6.4 oz (93.6 kg)   07/26/18 208 lb (94.3 kg)              We Performed the Following     OB HEMOGLOBIN          Today's Medication Changes          These changes are accurate as of 8/23/18 12:12 PM.  If you have any questions, ask your nurse or doctor.               Start taking these medicines.        Dose/Directions    predniSONE 20 MG tablet   Commonly known as:  DELTASONE   Used for:  Rash and nonspecific skin eruption   Started by:  Darcy Duarte PA-C        Dose:  40 mg   Take 2 tablets (40 mg) by mouth daily for 5 days   Quantity:  10 tablet   Refills:  0       sulfamethoxazole-trimethoprim 800-160 MG per tablet   Commonly known as:  BACTRIM DS/SEPTRA DS   Used for:  Rash and nonspecific skin eruption   Started by:  Darcy Duarte PA-C        Dose:  1 tablet   Take 1 tablet by mouth 2 times daily   Quantity:  20 tablet   Refills:  0            Where to get your medicines      These medications were sent to [facility] [place] - [address]  [address] [place] [address]     Phone:  [phone]     hydrOXYzine 25 MG tablet    predniSONE 20 MG tablet    sulfamethoxazole-trimethoprim 800-160 MG per tablet                Primary Care Provider Office Phone # Fax #    Darcy Duarte PA-C [phone]       [address] [place] [address]        Equal Access to Services      [facility] : Hadii aad ku ana maria Socliffordali, waaxda luqadaha, qaybta kaalmada adenapoleon, nino marilyin hayaaemilia plascenciaalexandra emmanuel sharmila . So [facility] [phone].    ATENCIÓN: Si habla español, tiene a bernal disposición servicios gratuitos de asistencia lingüística. Jarredame al [phone].    We comply with applicable federal civil rights laws and [place] laws. We do not discriminate on the basis of race, color, national origin, age, disability, sex, sexual orientation, or gender identity.            Thank you!     Thank you for choosing [facility]  for your care. Our goal is always to provide you with excellent care. Hearing back from our patients is one way we can continue to improve our services. Please take a few minutes to complete the written survey that you may receive in the mail after your visit with us. Thank you!             Your Updated Medication List - Protect others around you: Learn how to safely use, store and throw away your medicines at www.disposemymeds.org.          This list is accurate as of 8/23/18 12:12 PM.  Always use your most recent med list.                   Brand Name Dispense Instructions for use Diagnosis    albuterol 108 (90 Base) MCG/ACT inhaler    PROAIR HFA/PROVENTIL HFA/VENTOLIN HFA    2 Inhaler    Inhale 2 puffs into the lungs every 6 hours as needed for shortness of breath / dyspnea or wheezing    Mild persistent asthma without complication       budesonide-formoterol 160-4.5 MCG/ACT Inhaler    SYMBICORT    3 Inhaler    Inhale 2 puffs into the lungs 2 times daily    Mild persistent asthma without complication       cetirizine 10 MG tablet    ZYRTEC ALLERGY    90 tablet    Take 1 tablet (10 mg) by mouth daily    Seasonal allergic rhinitis, unspecified chronicity, unspecified trigger       citalopram 10 MG tablet    celeXA    90 tablet    Take 1 tablet (10 mg) by mouth daily    Post partum depression       hydrOXYzine 25 MG tablet    ATARAX    90 tablet    TAKE ONE TO  TWO TABLETS BY MOUTH EVERY 4 HOURS AS NEEDED FOR ITCHING    Rash and nonspecific skin eruption       mometasone 50 MCG/ACT spray    NASONEX    3 Box    Spray 2 sprays into both nostrils daily    Seasonal allergic rhinitis, unspecified chronicity, unspecified trigger       predniSONE 20 MG tablet    DELTASONE    10 tablet    Take 2 tablets (40 mg) by mouth daily for 5 days    Rash and nonspecific skin eruption       sulfamethoxazole-trimethoprim 800-160 MG per tablet    BACTRIM DS/SEPTRA DS    20 tablet    Take 1 tablet by mouth 2 times daily    Rash and nonspecific skin eruption

## 2018-08-23 NOTE — NURSING NOTE
"Chief Complaint   Patient presents with     Post Partum Exam       Initial /80  Pulse 90  Temp 96.8  F (36  C) (Temporal)  Resp 18  Wt 211 lb (95.7 kg)  SpO2 98%  BMI 36.22 kg/m2 Estimated body mass index is 36.22 kg/(m^2) as calculated from the following:    Height as of 1/8/18: 5' 4\" (1.626 m).    Weight as of this encounter: 211 lb (95.7 kg).  BP completed using cuff size: levy Bland MA      "

## 2018-08-23 NOTE — PROGRESS NOTES
Kena is here for a 6-week postpartum checkup.  She has concerns regarding an itchy rash mainly lower extremities-would like refill of Atarax which she is using fairly consistently at bedtime.  Also uses Zyrtec daily for her allergies.  Had her pets checked because there was question whether or not these were fleabites.  States they start as a pimple with a white had become highly pruritic and at night when she is sleeping she is actually scratching them causing a lot of scabbing and scarring.  No other people surrounding her have a similar rash.    He had presumed carpal tunnel mainly on the left side during pregnancy.  Left fourth finger continues to have paresthesias and sometimes shooting pain.    Saw this patient soon after delivery for postpartum depression.  She had had previous history of depression in the past.  Celexa was initiated immediately-currently using 10 mg daily and doing very well on this dose.    She had a  of a viable girl, weight 7 pounds 13 oz., with only complication being a third-degree tear. Date of delivery was 2018. Since delivery, she has not been breast feeding.  Currently she has no signs of infection, bleeding or other complications but did have a diagnosis of endometritis along with a positive Chlamydia test postpartum.  Will be having repeat chlamydia test with IUD placement.  She is doing well and has no persistent symptoms.  She is not pregnant.  We discussed contraceptions and she has chosen Mirena IUD.  She is scheduled for 2018 for IUD placement.    Post partum tubal: No  History of Gestational Diabetes? No  Type of Delivery:  Vaginal  Feeding Method:  Formula  If initiated breast feeding and stopped, how long did you breast feed?:  Didn't breat feed     REVIEW OF SYSTEMS:  Ears/Nose/Throat: negative  Respiratory: negative  Cardiovascular: negative  Gastrointestinal: negative  Genitourinary: negative  Musculoskeletal: negative    Neurologic: positive for left  fourth finger paresthesia  Skin: positive for rash   Endocrine:  negative  Vagina: negative  Cervix: negative  Breasts: negative  Vulva: negative  Episiotomy: negative    Contraception Plan: Mirena IUD    Past Medical History:   Diagnosis Date     Chronic adenoiditis 3/3/2010     Hypertrophy of nasal turbinates 3/3/2010     Iron deficiency anemia, unspecified     TREATED WITH IRON SUPPLEMENTS     Mild intermittent asthma      Plantar fasciitis 3/15/2012     Pneumonia, organism unspecified(486)     Pneumonia     Post partum depression 7/5/2018     Seasonal allergies      Past Surgical History:   Procedure Laterality Date     HC THERAPUTIC FRACTURE INFER TURBINATE  03/30/10     TONSILLECTOMY & ADENOIDECTOMY  03/30/10    turbinoplasty     Social History   Substance Use Topics     Smoking status: Never Smoker     Smokeless tobacco: Never Used      Comment: no smokers in the household     Alcohol use No     Family History   Problem Relation Age of Onset     Hypertension Mother      Hyperlipidemia Mother      GASTROINTESTINAL DISEASE Father      HEART DISEASE Father      Back Pain Father      Anxiety Disorder Sister      Depression Sister      Hearing Loss Sister      congenital     Coronary Artery Disease Maternal Grandmother      Cerebrovascular Disease Maternal Grandmother      Diabetes Maternal Grandfather      Type II     Cerebrovascular Disease Maternal Grandfather      Cancer Paternal Grandmother      lung (she was a smoker)     Coronary Artery Disease Paternal Grandfather      aortic aneurysm        Allergies   Allergen Reactions     Keflex [Cephalexin Hcl] Hives     Penicillins      Prozac [Fluoxetine] Nausea     Patient requested a change     Current Outpatient Prescriptions   Medication Sig Dispense Refill     hydrOXYzine (ATARAX) 25 MG tablet TAKE ONE TO TWO TABLETS BY MOUTH EVERY 4 HOURS AS NEEDED FOR ITCHING 90 tablet 1     predniSONE (DELTASONE) 20 MG tablet Take 2 tablets (40 mg) by mouth daily for 5 days  10 tablet 0     sulfamethoxazole-trimethoprim (BACTRIM DS/SEPTRA DS) 800-160 MG per tablet Take 1 tablet by mouth 2 times daily 20 tablet 0     albuterol (PROAIR HFA/PROVENTIL HFA/VENTOLIN HFA) 108 (90 BASE) MCG/ACT Inhaler Inhale 2 puffs into the lungs every 6 hours as needed for shortness of breath / dyspnea or wheezing 2 Inhaler 3     budesonide-formoterol (SYMBICORT) 160-4.5 MCG/ACT Inhaler Inhale 2 puffs into the lungs 2 times daily 3 Inhaler 3     cetirizine (ZYRTEC ALLERGY) 10 MG tablet Take 1 tablet (10 mg) by mouth daily 90 tablet 3     citalopram (CELEXA) 10 MG tablet Take 1 tablet (10 mg) by mouth daily 90 tablet 1     mometasone (NASONEX) 50 MCG/ACT spray Spray 2 sprays into both nostrils daily 3 Box 3       EXAM:  /80  Pulse 90  Temp 96.8  F (36  C) (Temporal)  Resp 18  Wt 211 lb (95.7 kg)  SpO2 98%  BMI 36.22 kg/m2  HEENT: grossly normal.  NECK: no lymphadenopathy or thyroidomegaly.  LUNGS: CTA X 2, no rales or crackles.  BACK: No spinal or CVA tenderness.  HEART: RRR without murmurs clicks or gallops.  ABDOMEN: soft, non tender, good bowel sounds, without masses rebound, guarding or tenderness.  PELVIC:  External genitalia; normal without lesion, repair well healed.   Vagina: normal mucosa and rugae, no discharge.  Cervix: multiparous, well healed, without lesion.  Uterus: non pregnant in size, firm , mobile, no lesions,     Normal shape, position and consistency  Adnexa: non tender, without masses.  SKIN: Excoriated noninfected lesions at least 15-20 on each lower extremity noted.  There are no new lesions, but patient states they look like, comodomal pimples.  No vesicles noted.  No cellulitis.    EXTREMITIES:  warm to touch, good pulses, no ankle edema or calf tenderness.  NEUROLOGIC: grossly normal other than fourth left finger feels numb along the lateral side.  Normal temperature and coloration-pulses normal.    ASSESSMENT:      Routine postpartum follow-up  Rash and nonspecific skin  eruption  Left hand paresthesia      PLAN:    Discussed calcium intake, vitamins and supplements  Exercise encouraged  Follow up in 1 year  Hemoglobin obtained  Weight loss recommended.    Will get EMG study of the left arm to determine issue with paresthesias in the left fourth finger.  We will contact her with this appointment.    Regarding rash dear-sounds like a folliculitis.  We will treat with Bactrim DS as well as a short course of prednisone.  Refilled hydroxyzine for nighttime use and will continue to use Zyrtec daily.  If rash does not resolve, may need to consider a biopsy of a new lesion.    Orders Placed This Encounter     OB HEMOGLOBIN     hydrOXYzine (ATARAX) 25 MG tablet     predniSONE (DELTASONE) 20 MG tablet     sulfamethoxazole-trimethoprim (BACTRIM DS/SEPTRA DS) 800-160 MG per tablet       AVS given to patient upon discharge today.  Electronically signed by Darcy Duarte PA-C  August 23, 2018  12:03 PM      Answers for HPI/ROS submitted by the patient on 8/23/2018   PHQ-2 Score: 0

## 2018-09-21 ENCOUNTER — HOSPITAL ENCOUNTER (EMERGENCY)
Facility: CLINIC | Age: 23
Discharge: HOME | End: 2018-09-21
Attending: EMERGENCY MEDICINE | Admitting: EMERGENCY MEDICINE
Payer: COMMERCIAL

## 2018-09-21 VITALS
SYSTOLIC BLOOD PRESSURE: 128 MMHG | RESPIRATION RATE: 16 BRPM | TEMPERATURE: 98 F | DIASTOLIC BLOOD PRESSURE: 70 MMHG | OXYGEN SATURATION: 99 %

## 2018-09-21 DIAGNOSIS — L03.032 PARONYCHIA OF TOE, LEFT: ICD-10-CM

## 2018-09-21 PROCEDURE — 64450 NJX AA&/STRD OTHER PN/BRANCH: CPT | Performed by: EMERGENCY MEDICINE

## 2018-09-21 PROCEDURE — 99283 EMERGENCY DEPT VISIT LOW MDM: CPT | Mod: 25 | Performed by: EMERGENCY MEDICINE

## 2018-09-21 PROCEDURE — 64450 NJX AA&/STRD OTHER PN/BRANCH: CPT | Mod: Z6 | Performed by: EMERGENCY MEDICINE

## 2018-09-21 PROCEDURE — 99284 EMERGENCY DEPT VISIT MOD MDM: CPT | Mod: 25 | Performed by: EMERGENCY MEDICINE

## 2018-09-21 RX ORDER — MUPIROCIN 20 MG/G
OINTMENT TOPICAL 2 TIMES DAILY
Qty: 15 G | Refills: 0 | Status: SHIPPED | OUTPATIENT
Start: 2018-09-21 | End: 2018-09-28

## 2018-09-21 RX ORDER — DOXYCYCLINE 100 MG/1
100 CAPSULE ORAL 2 TIMES DAILY
Qty: 14 CAPSULE | Refills: 0 | Status: SHIPPED | OUTPATIENT
Start: 2018-09-21 | End: 2018-09-28

## 2018-09-21 NOTE — ED AVS SNAPSHOT
[facility] Emergency Department    [address] [place] [address]    Phone:  [phone]    Fax:  [phone]                                       Kena Sorto   MRN: [id number]    Department:  [facility] Emergency Department   Date of Visit:  9/21/2018           After Visit Summary Signature Page     I have received my discharge instructions, and my questions have been answered. I have discussed any challenges I see with this plan with the nurse or doctor.    ..........................................................................................................................................  Patient/Patient Representative Signature      ..........................................................................................................................................  Patient Representative Print Name and Relationship to Patient    ..................................................               ................................................  Date                                   Time    ..........................................................................................................................................  Reviewed by Signature/Title    ...................................................              ..............................................  Date                                               Time          22EPIC Rev 08/18

## 2018-09-21 NOTE — DISCHARGE INSTRUCTIONS
Soak 2-3 times per day.  After soaking, place a small amount of Bactroban over the area and wrap.    Antibiotics as prescribed.  These are not related to penicillin, so hopefully you will not have any reactions.    I recommend following up with Dr. Lopez, podiatry.  We can help you make an appointment if you would like.    Return at anytime for concerns.    I hope you improve quickly!!

## 2018-09-21 NOTE — ED AVS SNAPSHOT
[facility] Emergency Department    [address] [place] [address]     [place] [address]    Phone:  [phone]    Fax:  [phone]                                       Kena Sorto   MRN: [id number]    Department:  [facility] Emergency Department   Date of Visit:  9/21/2018           Patient Information     Date Of Birth          1995        Your diagnoses for this visit were:     Paronychia of toe, left        You were seen by Mikayla Marina MD.      Follow-up Information     Follow up with Leonardo Lopez DPM. Schedule an appointment as soon as possible for a visit in 1 week.    Specialty:  Podiatry    Contact information:    [address] [place] [address] [address] [place] [address] [phone]          Discharge Instructions       Soak 2-3 times per day.  After soaking, place a small amount of Bactroban over the area and wrap.    Antibiotics as prescribed.  These are not related to penicillin, so hopefully you will not have any reactions.    I recommend following up with Dr. Lopez, podiatry.  We can help you make an appointment if you would like.    Return at anytime for concerns.    I hope you improve quickly!!    Discharge References/Attachments     PARONYCHIA OF THE FINGER OR TOE (ENGLISH)      Your next 10 appointments already scheduled     Oct 02, 2018 11:00 AM CDT   [facility] Dar[facility]   [facility]    [address] [address] [phone]           Bring a current list of meds and any records pertaining to this visit. For Physicals, please bring immunization records and any forms needing to be filled out. Please arrive 10 minutes early to complete paperwork.              24 Hour Appointment Hotline       To make an appointment at any [facility], call [phone] ([phone]). If you don't have a family doctor or clinic, we will help you find one. [facility] are conveniently located to  serve the needs of you and your family.             Review of your medicines      START taking        Dose / Directions Last dose taken    doxycycline 100 MG capsule   Commonly known as:  VIBRAMYCIN   Dose:  100 mg   Quantity:  14 capsule        Take 1 capsule (100 mg) by mouth 2 times daily for 7 days   Refills:  0        mupirocin 2 % ointment   Commonly known as:  BACTROBAN   Quantity:  15 g        Apply topically 2 times daily for 7 days   Refills:  0          Our records show that you are taking the medicines listed below. If these are incorrect, please call your family doctor or clinic.        Dose / Directions Last dose taken    albuterol 108 (90 Base) MCG/ACT inhaler   Commonly known as:  PROAIR HFA/PROVENTIL HFA/VENTOLIN HFA   Dose:  2 puff   Quantity:  2 Inhaler        Inhale 2 puffs into the lungs every 6 hours as needed for shortness of breath / dyspnea or wheezing   Refills:  3        budesonide-formoterol 160-4.5 MCG/ACT Inhaler   Commonly known as:  SYMBICORT   Dose:  2 puff   Quantity:  3 Inhaler        Inhale 2 puffs into the lungs 2 times daily   Refills:  3        cetirizine 10 MG tablet   Commonly known as:  ZYRTEC ALLERGY   Dose:  10 mg   Quantity:  90 tablet        Take 1 tablet (10 mg) by mouth daily   Refills:  3        citalopram 10 MG tablet   Commonly known as:  celeXA   Dose:  10 mg   Quantity:  90 tablet        Take 1 tablet (10 mg) by mouth daily   Refills:  1        hydrOXYzine 25 MG tablet   Commonly known as:  ATARAX   Quantity:  90 tablet        TAKE ONE TO TWO TABLETS BY MOUTH EVERY 4 HOURS AS NEEDED FOR ITCHING   Refills:  1        mometasone 50 MCG/ACT spray   Commonly known as:  NASONEX   Dose:  2 spray   Quantity:  3 Box        Spray 2 sprays into both nostrils daily   Refills:  3                Prescriptions were sent or printed at these locations (2 Prescriptions)                   [facility] - [place], [place] - [address]   [address], [facility] [address]     Telephone:  [phone]   Fax:  [phone]   Hours:                  E-Prescribed (2 of 2)         doxycycline (VIBRAMYCIN) 100 MG capsule               mupirocin (BACTROBAN) 2 % ointment                Orders Needing Specimen Collection     None      Pending Results     No orders found from 9/19/2018 to 9/22/2018.            Pending Culture Results     No orders found from 9/19/2018 to 9/22/2018.            Pending Results Instructions     If you had any lab results that were not finalized at the time of your Discharge, you can call the ED Lab Result RN at [phone]. You will be contacted by this team for any positive Lab results or changes in treatment. The nurses are available 7 days a week from 10A to 6:30P.  You can leave a message 24 hours per day and they will return your call.        Thank you for choosing [facility]       Thank you for choosing [facility] for your care. Our goal is always to provide you with excellent care. Hearing back from our patients is one way we can continue to improve our services. Please take a few minutes to complete the written survey that you may receive in the mail after you visit with us. Thank you!        Re[employer] Information     [employer] gives you secure access to your electronic health record. If you see a primary care provider, you can also send messages to your care team and make appointments. If you have questions, please call your primary care clinic.  If you do not have a primary care provider, please call [phone] and they will assist you.        Care EveryWhere ID     This is your Care EveryWhere ID. This could be used by other organizations to access your [facility] medical records  [id number]        Equal Access to Services     JOAQUINA MOORE : Ac Johnson, waaxda luqadaha, qaybta kaalmada nino wood. So [facility] [phone].    ATENCIÓN: Si habla español, tiene a bernal disposición servicios gratuitos de  asistencia lingüística. Diandra al [phone].    We comply with applicable federal civil rights laws and [place] laws. We do not discriminate on the basis of race, color, national origin, age, disability, sex, sexual orientation, or gender identity.            After Visit Summary       This is your record. Keep this with you and show to your community pharmacist(s) and doctor(s) at your next visit.

## 2018-09-21 NOTE — ED PROVIDER NOTES
History     Chief Complaint   Patient presents with     Ingrown Toenail     The history is provided by the patient and medical records.     This is a 23-year-old female, history of asthma, depression/anxiety, presenting with ingrown toenail.  Patient has had about a week of tenderness and increasing swelling to the lateral portion of her left great toe/nail.  She has been soaking it, using a drawing salve but notes that she has had increased redness, swelling, tenderness.  No fevers or chills.  She has tried to clip the area.  She has not noted significant drainage.  No other abnormalities.  Problem List:    Patient Active Problem List    Diagnosis Date Noted     Left hand paresthesia-4th finger 08/23/2018     Priority: Medium     Post partum depression 07/05/2018     Priority: Medium     Constipation, unspecified constipation type 04/11/2017     Priority: Medium     Seasonal allergic rhinitis 04/06/2017     Priority: Medium     Major depressive disorder, recurrent episode, mild (H) 08/24/2016     Priority: Medium     Mild persistent asthma without complication 08/24/2016     Priority: Medium     Geographic tongue 09/13/2012     Priority: Medium     Severe obesity (BMI 35.0-39.9) (H) 05/02/2012     Priority: Medium     Chronic adenoiditis 03/03/2010     Priority: Medium     Per ENT visit       Hypertrophy of nasal turbinates 03/03/2010     Priority: Medium     Per ENT visit       Attention deficit disorder 01/31/2008     Priority: Medium     Problem list name updated by automated process. Provider to review       Generalized anxiety disorder 01/31/2008     Priority: Medium        Past Medical History:    Past Medical History:   Diagnosis Date     Chronic adenoiditis 3/3/2010     Hypertrophy of nasal turbinates 3/3/2010     Iron deficiency anemia, unspecified      Mild intermittent asthma      Plantar fasciitis 3/15/2012     Pneumonia, organism unspecified(486)      Post partum depression 7/5/2018     Seasonal  allergies        Past Surgical History:    Past Surgical History:   Procedure Laterality Date     HC THERAPUTIC FRACTURE INFER TURBINATE  03/30/10     TONSILLECTOMY & ADENOIDECTOMY  03/30/10    turbinoplasty       Family History:    Family History   Problem Relation Age of Onset     Hypertension Mother      Hyperlipidemia Mother      GASTROINTESTINAL DISEASE Father      HEART DISEASE Father      Back Pain Father      Anxiety Disorder Sister      Depression Sister      Hearing Loss Sister      congenital     Coronary Artery Disease Maternal Grandmother      Cerebrovascular Disease Maternal Grandmother      Diabetes Maternal Grandfather      Type II     Cerebrovascular Disease Maternal Grandfather      Cancer Paternal Grandmother      lung (she was a smoker)     Coronary Artery Disease Paternal Grandfather      aortic aneurysm       Social History:  Marital Status:  Single [1]  Social History   Substance Use Topics     Smoking status: Never Smoker     Smokeless tobacco: Never Used      Comment: no smokers in the household     Alcohol use No        Medications:      doxycycline (VIBRAMYCIN) 100 MG capsule   mupirocin (BACTROBAN) 2 % ointment   albuterol (PROAIR HFA/PROVENTIL HFA/VENTOLIN HFA) 108 (90 BASE) MCG/ACT Inhaler   budesonide-formoterol (SYMBICORT) 160-4.5 MCG/ACT Inhaler   cetirizine (ZYRTEC ALLERGY) 10 MG tablet   citalopram (CELEXA) 10 MG tablet   hydrOXYzine (ATARAX) 25 MG tablet   mometasone (NASONEX) 50 MCG/ACT spray         Review of Systems   All other ROS reviewed and are negative or non-contributory except as stated in HPI.     Physical Exam   BP: 128/70  Heart Rate: 106  Temp: 98  F (36.7  C)  Resp: 16  SpO2: 99 %      Physical Exam   Constitutional: She appears well-developed and well-nourished.   Healthy-appearing young girl sitting in the bed   HENT:   Head: Normocephalic.   Nose: Nose normal.   Eyes: Conjunctivae and EOM are normal.   Neck: Normal range of motion. Neck supple.   Cardiovascular:  Normal rate, regular rhythm and intact distal pulses.    Pulmonary/Chest: Effort normal.   Musculoskeletal:        Feet:    Neurological: She is alert.   Skin: Skin is warm and dry. She is not diaphoretic.   Psychiatric: She has a normal mood and affect. Her behavior is normal.   Vitals reviewed.      ED Course (with Medical Decision Making)    Pt seen and examined by me.  RN and EPIC notes reviewed.      Patient with swelling and pain in her left great toe.  Exam shows an area of induration, fluctuance, erythema, tenderness consistent with a paronychia.  A digital block was performed to the lateral portion of the great toe with 1% lidocaine, 3 cc.  The edge of the skin on the toe was pulled up, small amount of pus noted.  Bacitracin was placed over the toe and her toe is wrapped.  Plan is to discharge her home with Bactroban.  Rest, ice, elevate, soak, Bactroban with dressing changes, oral antibiotics as prescribed.  Follow-up with podiatry.  Return for worsening, changes or concerns.       Procedures  .this    Assessments & Plan      I have reviewed the findings, diagnosis, plan and need for follow up with the patient.    Discharge Medication List as of 9/21/2018 12:34 PM      START taking these medications    Details   doxycycline (VIBRAMYCIN) 100 MG capsule Take 1 capsule (100 mg) by mouth 2 times daily for 7 days, Disp-14 capsule, R-0, E-Prescribe      mupirocin (BACTROBAN) 2 % ointment Apply topically 2 times daily for 7 daysDisp-15 g, [id number]             Final diagnoses:   Paronychia of toe, left     Disposition: Patient discharged home in stable condition.  Plan as above.  Return for concerns.     Note: Chart documentation done in part with Dragon Voice Recognition software. Although reviewed after completion, some word and grammatical errors may remain.     9/21/2018   [facility] EMERGENCY DEPARTMENT     Mikayla Marina MD  09/21/18 2093

## 2018-09-24 ENCOUNTER — PATIENT OUTREACH (OUTPATIENT)
Dept: FAMILY MEDICINE | Facility: CLINIC | Age: 23
End: 2018-09-24

## 2018-09-25 NOTE — PROGRESS NOTES
Clinic Care Coordination Contact    Situation: Patient chart reviewed by care coordinator.    Background: : Patient was on the IP discharge list with a DEREJE score of 52 and 2 ED visits.     Assessment: Pt was seen in ED on 9/21 for ingrown toenail.  Pt was treated with antibiotics and advised to follow up [facility] PCP.   Pt has scheduled a follow up appt    Plan/Recommendations: no outreach by CC at this time pt is low risk and has follow up.     Najma KRAUS, RN, PHN  Care Coordination    [facility] [place]  [address] [place] [address]  Office: [phone]  Fax [phone]   [facility]  [address] [place] [address]  Office: [phone] Fax [phone]  [email or web address]   [email or web address]   Connect with [facility] on social media.

## 2018-09-26 ENCOUNTER — TELEPHONE (OUTPATIENT)
Dept: FAMILY MEDICINE | Facility: CLINIC | Age: 23
End: 2018-09-26

## 2018-09-26 NOTE — TELEPHONE ENCOUNTER
----- Message from Kena Sorto sent at 9/25/2018  6:58 PM CDT -----  Regarding: Appointment Request  Contact: [phone]  Appointment Request From: Kena Sorto    With Provider: Darcy Duarte PA-C [-Primary Care Physician-]    Preferred Date Range: Any date 9/25/2018 or later    Preferred Times: Any    Reason for visit: Request an Appointment    Comments:  Need to see her about my foot.

## 2018-10-02 ENCOUNTER — OFFICE VISIT (OUTPATIENT)
Dept: FAMILY MEDICINE | Facility: CLINIC | Age: 23
End: 2018-10-02
Payer: COMMERCIAL

## 2018-10-02 VITALS
BODY MASS INDEX: 36.56 KG/M2 | OXYGEN SATURATION: 100 % | DIASTOLIC BLOOD PRESSURE: 80 MMHG | SYSTOLIC BLOOD PRESSURE: 120 MMHG | RESPIRATION RATE: 18 BRPM | HEART RATE: 88 BPM | WEIGHT: 213 LBS | TEMPERATURE: 96.7 F

## 2018-10-02 DIAGNOSIS — Z30.430 ENCOUNTER FOR IUD INSERTION: Primary | ICD-10-CM

## 2018-10-02 DIAGNOSIS — Z30.430 ENCOUNTER FOR INSERTION OF MIRENA IUD: ICD-10-CM

## 2018-10-02 LAB — HCG UR QL: NEGATIVE

## 2018-10-02 PROCEDURE — 81025 URINE PREGNANCY TEST: CPT | Performed by: PHYSICIAN ASSISTANT

## 2018-10-02 PROCEDURE — 58300 INSERT INTRAUTERINE DEVICE: CPT | Performed by: PHYSICIAN ASSISTANT

## 2018-10-02 ASSESSMENT — PAIN SCALES - GENERAL: PAINLEVEL: NO PAIN (0)

## 2018-10-02 NOTE — PROGRESS NOTES
INDICATIONS:                                                      Is a pregnancy test required: Yes.  Was it positive or negative?  Negative  Was a consent obtained?  Yes    Kena Sorto is a 23 year old female,   who presents for insertion of an IUD. The risks, benefits and alternatives of IUD insertion were discussed in detail previously. She also has reviewed the product brochure.  She has elected to go ahead with the insertion  today and her questions were answered. Consent was signed. Her LMP began about 2 weeks ago and was normal in duration and amount of flow. She delivered vaginally 2018.     Today's PHQ-2 Score:   PHQ-2 (  [employer]) 2018   Q1: Little interest or pleasure in doing things 0   Q2: Feeling down, depressed or hopeless 0   PHQ-2 Score 0   Q1: Little interest or pleasure in doing things Not at all   Q2: Feeling down, depressed or hopeless Not at all   PHQ-2 Score 0       PROCEDURE:                                                      The pelvic exam revealed normal external genitalia. On bimanual exam the uterus was Retroverted and normal in size with no tenderness present. A speculum was inserted into the vagina and the cervix was visualized. The cervix was prepped with Betadine . The anterior lip of the cervix was grasped with a single toothed tenaculum. The uterus sounded to 8 cm. A Mirena IUD was then inserted without difficulty. The string was cut to 3 cm.  The patient experienced no amount of cramping.    POST PROCEDURE:                                                      She  tolerated the procedure well. There were no complications. Patient was discharged in stable condition.    Return to clinic in 5 weeks for IUD check.  Call if severe cramping, fever, abnormal bleeding, abnormal discharge or pelvic pain develop.  Patient was counseled about the chance of irregular bleeding.    Darcy Duarte PA-C     Orders Placed This Encounter     HCG qualitative urine        AVS given to patient upon discharge today.  Electronically signed by Darcy Duarte PA-C  October 2, 2018  12:05 PM

## 2018-10-02 NOTE — MR AVS SNAPSHOT
After Visit Summary   10/2/2018    Kena Sorto    MRN: [id number]           Patient Information     Date Of Birth          1995        Visit Information        Provider Department      10/2/2018 11:00 AM Darcy Duarte PA-C [facility]        Today's Diagnoses     Encounter for insertion of mirena IUD           Follow-ups after your visit        Your next 10 appointments already scheduled     Oct 04, 2018  7:45 AM CDT   New Visit with Leonardo Lopez DPM   [facility] ([facility])    [address] [place] [address] [phone]              Who to contact     If you have questions or need follow up information about today's clinic visit or your schedule please contact [facility] directly at [phone].  Normal or non-critical lab and imaging results will be communicated to you by MyChart, letter or phone within 4 business days after the clinic has received the results. If you do not hear from us within 7 days, please contact the clinic through MyChart or phone. If you have a critical or abnormal lab result, we will notify you by phone as soon as possible.  Submit refill requests through [employer] or call your pharmacy and they will forward the refill request to us. Please allow 3 business days for your refill to be completed.          Additional Information About Your Visit        MyChart Information     [employer] gives you secure access to your electronic health record. If you see a primary care provider, you can also send messages to your care team and make appointments. If you have questions, please call your primary care clinic.  If you do not have a primary care provider, please call [phone] and they will assist you.        Care EveryWhere ID     This is your Care EveryWhere ID. This could be used by other organizations to access your [facility] medical records  [id number]        Your Vitals Were      Pulse Temperature Respirations Last Period Pulse Oximetry BMI (Body Mass Index)    88 96.7  F (35.9  C) (Temporal) 18 09/19/2018 (Approximate) 100% 36.56 kg/m2       Blood Pressure from Last 3 Encounters:   10/02/18 120/80   09/21/18 128/70   08/23/18 118/80    Weight from Last 3 Encounters:   10/02/18 213 lb (96.6 kg)   08/23/18 211 lb (95.7 kg)   07/31/18 206 lb 6.4 oz (93.6 kg)              We Performed the Following     HC LEVONORGESTREL IU 52MG 5 YR     HCG qualitative urine     INSERTION INTRAUTERINE DEVICE          Today's Medication Changes          These changes are accurate as of 10/2/18 12:07 PM.  If you have any questions, ask your nurse or doctor.               Start taking these medicines.        Dose/Directions    levonorgestrel 20 MCG/24HR IUD   Commonly known as:  MIRENA   Used for:  Encounter for insertion of mirena IUD   Started by:  Darcy Duarte PA-C        Dose:  1 each   1 each (20 mcg) by Intrauterine route continuous   Refills:  0            Where to get your medicines      Some of these will need a paper prescription and others can be bought over the counter.  Ask your nurse if you have questions.     You don't need a prescription for these medications     levonorgestrel 20 MCG/24HR IUD                Primary Care Provider Office Phone # Fax #    Darcy Duarte PA-C [phone] [address] [place] [address]        Equal Access to Services     JOSE MOORE AH: Hadii ann-marie ku hadasho Soomaali, waaxda luqadaha, qaybta kaalmada adeegyada, waxay brianne stoll adealexandra gomez. So [facility] [phone].    ATENCIÓN: Si habla español, tiene a bernal disposición servicios gratuitos de asistencia lingüística. Llame al [phone].    We comply with applicable federal civil rights laws and [place] laws. We do not discriminate on the basis of race, color, national origin, age, disability, sex, sexual orientation, or gender identity.            Thank you!     Thank you  for choosing [facility]  for your care. Our goal is always to provide you with excellent care. Hearing back from our patients is one way we can continue to improve our services. Please take a few minutes to complete the written survey that you may receive in the mail after your visit with us. Thank you!             Your Updated Medication List - Protect others around you: Learn how to safely use, store and throw away your medicines at www.disposemymeds.org.          This list is accurate as of 10/2/18 12:07 PM.  Always use your most recent med list.                   Brand Name Dispense Instructions for use Diagnosis    albuterol 108 (90 Base) MCG/ACT inhaler    PROAIR HFA/PROVENTIL HFA/VENTOLIN HFA    2 Inhaler    Inhale 2 puffs into the lungs every 6 hours as needed for shortness of breath / dyspnea or wheezing    Mild persistent asthma without complication       budesonide-formoterol 160-4.5 MCG/ACT Inhaler    SYMBICORT    3 Inhaler    Inhale 2 puffs into the lungs 2 times daily    Mild persistent asthma without complication       cetirizine 10 MG tablet    ZYRTEC ALLERGY    90 tablet    Take 1 tablet (10 mg) by mouth daily    Seasonal allergic rhinitis, unspecified chronicity, unspecified trigger       citalopram 10 MG tablet    celeXA    90 tablet    Take 1 tablet (10 mg) by mouth daily    Post partum depression       hydrOXYzine 25 MG tablet    ATARAX    90 tablet    TAKE ONE TO TWO TABLETS BY MOUTH EVERY 4 HOURS AS NEEDED FOR ITCHING    Rash and nonspecific skin eruption       levonorgestrel 20 MCG/24HR IUD    MIRENA     1 each (20 mcg) by Intrauterine route continuous    Encounter for insertion of mirena IUD       mometasone 50 MCG/ACT spray    NASONEX    3 Box    Spray 2 sprays into both nostrils daily    Seasonal allergic rhinitis, unspecified chronicity, unspecified trigger

## 2018-10-02 NOTE — NURSING NOTE
"Chief Complaint   Patient presents with     IUD       Initial /80  Pulse 88  Temp 96.7  F (35.9  C) (Temporal)  Resp 18  Wt 213 lb (96.6 kg)  LMP 09/19/2018 (Approximate)  SpO2 100%  BMI 36.56 kg/m2 Estimated body mass index is 36.56 kg/(m^2) as calculated from the following:    Height as of 1/8/18: 5' 4\" (1.626 m).    Weight as of this encounter: 213 lb (96.6 kg).  BP completed using cuff size: levy Bland MA      "

## 2018-10-04 ENCOUNTER — OFFICE VISIT (OUTPATIENT)
Dept: PODIATRY | Facility: CLINIC | Age: 23
End: 2018-10-04
Payer: COMMERCIAL

## 2018-10-04 VITALS
DIASTOLIC BLOOD PRESSURE: 72 MMHG | BODY MASS INDEX: 36.37 KG/M2 | TEMPERATURE: 97.6 F | HEIGHT: 64 IN | WEIGHT: 213 LBS | SYSTOLIC BLOOD PRESSURE: 120 MMHG

## 2018-10-04 DIAGNOSIS — L60.0 INGROWING NAIL: Primary | ICD-10-CM

## 2018-10-04 PROCEDURE — 11750 EXCISION NAIL&NAIL MATRIX: CPT | Mod: TA | Performed by: PODIATRIST

## 2018-10-04 PROCEDURE — 99203 OFFICE O/P NEW LOW 30 MIN: CPT | Mod: 25 | Performed by: PODIATRIST

## 2018-10-04 ASSESSMENT — PAIN SCALES - GENERAL: PAINLEVEL: MODERATE PAIN (5)

## 2018-10-04 NOTE — LETTER
10/4/2018         RE: Kena Sorto  [address]  [place], [place] [address]        Dear Colleague,    Thank you for referring your patient, Kena Sorto, to the [facility]. Please see a copy of my visit note below.    HPI:  Kena Sorto is a 23 year old female who is seen in consultation at the request of ED Dept - Mikayla Marina MD.    Pt presents for eval of:   (Onset, Location, L/R, Character, Treatments, Injury if yes)     Onset early Sept 2018, Ingrown lateral Left great toenail  Constant, sharp, throbbing, redness, drainage, swelling, pain 5 > w/pressuer  Soaking w/epsom salt    Works as a PCA.    Weight management plan: Patient was referred to their PCP to discuss a diet and exercise plan.     Review of Systems:  Patient denies fever, chills, rash, wound, stiffness, limping, numbness, weakness, heart burn, blood in stool, chest pain with activity, calf pain when walking, shortness of breath with activity, chronic cough, easy bleeding/bruising, swelling of ankles, excessive thirst, fatigue, depression, anxiety.  Pt admits to the symptoms noted in history above.     PAST MEDICAL HISTORY:   Past Medical History:   Diagnosis Date     Chronic adenoiditis 3/3/2010     Encounter for insertion of mirena IUD 10/2/2018    Remove by 10/2/2023     Hypertrophy of nasal turbinates 3/3/2010     Iron deficiency anemia, unspecified     TREATED WITH IRON SUPPLEMENTS     Mild intermittent asthma      Plantar fasciitis 3/15/2012     Pneumonia, organism unspecified(486)     Pneumonia     Post partum depression 7/5/2018     Seasonal allergies         PAST SURGICAL HISTORY:   Past Surgical History:   Procedure Laterality Date     HC THERAPUTIC FRACTURE INFER TURBINATE  03/30/10     TONSILLECTOMY & ADENOIDECTOMY  03/30/10    turbinoplasty        MEDICATIONS:   Current Outpatient Prescriptions:      budesonide-formoterol (SYMBICORT) 160-4.5 MCG/ACT Inhaler, Inhale 2 puffs into the lungs 2 times daily, Disp:  3 Inhaler, Rfl: 3     cetirizine (ZYRTEC ALLERGY) 10 MG tablet, Take 1 tablet (10 mg) by mouth daily, Disp: 90 tablet, Rfl: 3     citalopram (CELEXA) 10 MG tablet, Take 1 tablet (10 mg) by mouth daily, Disp: 90 tablet, Rfl: 1     hydrOXYzine (ATARAX) 25 MG tablet, TAKE ONE TO TWO TABLETS BY MOUTH EVERY 4 HOURS AS NEEDED FOR ITCHING, Disp: 90 tablet, Rfl: 1     levonorgestrel (MIRENA) 20 MCG/24HR IUD, 1 each (20 mcg) by Intrauterine route continuous, Disp: , Rfl:      mometasone (NASONEX) 50 MCG/ACT spray, Spray 2 sprays into both nostrils daily, Disp: 3 Box, Rfl: 3     albuterol (PROAIR HFA/PROVENTIL HFA/VENTOLIN HFA) 108 (90 BASE) MCG/ACT Inhaler, Inhale 2 puffs into the lungs every 6 hours as needed for shortness of breath / dyspnea or wheezing, Disp: 2 Inhaler, Rfl: 3     ALLERGIES:    Allergies   Allergen Reactions     Keflex [Cephalexin Hcl] Hives     Penicillins      Prozac [Fluoxetine] Nausea     Patient requested a change        SOCIAL HISTORY:   Social History     Social History     Marital status: Single     Spouse name: N/A     Number of children: N/A     Years of education: N/A     Occupational History     Not on file.     Social History Main Topics     Smoking status: Never Smoker     Smokeless tobacco: Never Used      Comment: no smokers in the household     Alcohol use No     Drug use: No     Sexual activity: No     Other Topics Concern     Not on file     Social History Narrative    1/2018  Lives in [place] with Vaughn rust.  Vaughn smokes e-cigs.  Has two indoor cats.  Aware of toxoplasmosis precautions.  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  No concerns about domestic violence.        FAMILY HISTORY:   Family History   Problem Relation Age of Onset     Hypertension Mother      Hyperlipidemia Mother      GASTROINTESTINAL DISEASE Father      HEART DISEASE Father      Back Pain Father      Anxiety Disorder Sister      Depression Sister      Hearing Loss Sister      congenital     Coronary Artery Disease  "Maternal Grandmother      Cerebrovascular Disease Maternal Grandmother      Diabetes Maternal Grandfather      Type II     Cerebrovascular Disease Maternal Grandfather      Cancer Paternal Grandmother      lung (she was a smoker)     Coronary Artery Disease Paternal Grandfather      aortic aneurysm        EXAM:Vitals: /72 (BP Location: Left arm, Cuff Size: Adult Large)  Temp 97.6  F (36.4  C) (Temporal)  Ht 5' 4\" (1.626 m)  Wt 213 lb (96.6 kg)  LMP 09/19/2018 (Approximate)  BMI 36.56 kg/m2  BMI= Body mass index is 36.56 kg/(m^2).    General appearance: Patient is alert and fully cooperative with history & exam.  No sign of distress is noted during the visit.     Psychiatric: Affect is pleasant & appropriate.  Patient appears motivated to improve health.     Respiratory: Breathing is regular & unlabored while sitting.     HEENT: Hearing is intact to spoken word.  Speech is clear.  No gross evidence of visual impairment that would impact ambulation.     Vascular: DP & PT pulses are intact & regular, CFT immediate, positive digital hair growth bilaterally.  No significant edema or varicosities noted and skin temperature is normal to both lower extremities.     Neurologic: Lower extremity sensation is intact to light touch.  No evidence of weakness or contracture in the lower extremities.  No evidence of neuropathy.    Dermatologic: Adequate texture, turgor and tone about the integument.  No discoloration or thickening of the toenail however the left lateral hallux nail border(s) are ingrown with localized erythema, discomfort and purulent drainage.     Musculoskeletal: Patient is ambulatory without assistive device or brace.  No gross ankle deformity noted.  No foot or ankle joint effusion is noted.     ASSESSMENT:       ICD-10-CM    1. Ingrowing nail L60.0 REMOVAL NAIL/NAIL BED, PARTIAL OR COMPLETE       Plan:   10/4/2018  We reviewed medical history and EPIC chart.  After obtaining informed consent to " permanently remove the left lateral hallux nail(s), I utilized 3 cc of lidocaine plain to achieve local anesthesia per digit.  The toenails were then prepped with Betadine in usual fashion.  A quarter inch Penrose drain was then utilized for hemostasis.  100% of the toenail border was avulsed utilizing a nail elevator, english anvil, 6100 blade and hemostat.  The proximal root portion of the nail was confirmed to be removed atraumatically.  Three applications of 89% phenol were applied to the surgical site for 30 seconds each followed by a curette after each application.  Surgical site was then flushed with alcohol and dressed with bacitracin and a nonadherent compression dressing.  Tourniquet was removed after approximately 3 minutes followed by immediate hyperemia to the distal aspect of the hallux.  Written postoperative instructions were dispensed and patient instructed to follow up in 1-2 weeks with any questions, pain,drainage, delayed healing or concerns.  I answered all questions to patients satisfaction.     If patient calls in the next 1-3 weeks with continued redness, pain or drainage I would recommend beginning oral Keflex 500 mg, 4 times a day ×10 days, after confirming there is no allergy.      Leonardo Lopez DPM      Again, thank you for allowing me to participate in the care of your patient.        Sincerely,        Leonardo Lopez DPM

## 2018-10-04 NOTE — MR AVS SNAPSHOT
After Visit Summary   10/4/2018    Kena Sorto    MRN: [id number]           Patient Information     Date Of Birth          1995        Visit Information        Provider Department      10/4/2018 7:45 AM Leonardo Lopez DPM [facility]        Today's Diagnoses     Ingrowing nail    -  1      Care Instructions    INGROWN TOENAIL POSTOPERATIVE INSTRUCTIONS   (Nail avulsion or chemical matrixectomy)   1.  Go directly home and elevate the affected foot on one or two pillows for the remainder of the day & evening. Your toe may stay numb for 2-8 hours.   2.  Take Tylenol, ibuprofen or another anti-inflammatory as needed for pain. Most people require no pain medication.  3.  Use oral antibiotic if that was prescribed at your doctor visit. Take the entire prescription even if your symptoms have improved.   4.  Keep dressing dry and intact the day of the procedure. The morning after the procedure, remove entire dressing and soak or wash the affected area in lukewarm water for 5-10 minutes.  You may add Epsom salt to soothe the area and help it become drier. Do this twice a day or more until the surgical site remains dry without drainage.  This may take 1-2 weeks if a small part of your nail was removed or 4-8 weeks if the entire nail was removed. You may count showering or bathing as one soak.  After each soak, pat the area dry with a clean towel or gauze and then allow to air dry for a few minutes. Then cover with a cloth or fabric bandaid.  Avoid ointments as they keep the area to wet. Encourage this wound to become dry with a scab.   5.  You may walk and pursue everyday activities as tolerated with either an open toe shoe or cut-out shoe as needed. You may wear regular roomy shoes if no pain is noted.  No swimming in the lake, river, pool or hot tub until the wound has become dry.   7.  Watch for any signs or symptoms of infection such as: red streaks going up the foot/leg,  "swelling, pus or foul odor. For patients that have had a permanent phenol procedure, the toe will drain longer and may look red or sore for a half an inch around the wound similar to infection because it is a chemical burn.  Please call with questions.  8.  You may call my office in 1-2 weeks if the surgical site is not becoming drier or if other complications occur.   9.  There is 5-10% chance of complications such as infection or formation of another nail or a thick scared nail.              Follow-ups after your visit        Who to contact     If you have questions or need follow up information about today's clinic visit or your schedule please contact [facility] directly at [phone].  Normal or non-critical lab and imaging results will be communicated to you by [employer], letter or phone within 4 business days after the clinic has received the results. If you do not hear from us within 7 days, please contact the clinic through [employer] or phone. If you have a critical or abnormal lab result, we will notify you by phone as soon as possible.  Submit refill requests through [employer] or call your pharmacy and they will forward the refill request to us. Please allow 3 business days for your refill to be completed.          Additional Information About Your Visit        [employer] Information     [employer] gives you secure access to your electronic health record. If you see a primary care provider, you can also send messages to your care team and make appointments. If you have questions, please call your primary care clinic.  If you do not have a primary care provider, please call [phone] and they will assist you.        Care EveryWhere ID     This is your Care EveryWhere ID. This could be used by other organizations to access your [facility] medical records  [id number]        Your Vitals Were     Temperature Height Last Period BMI (Body Mass Index)          97.6  F (36.4  C) (Temporal) 5' 4\" (1.626 m) " 09/19/2018 (Approximate) 36.56 kg/m2         Blood Pressure from Last 3 Encounters:   10/04/18 120/72   10/02/18 120/80   09/21/18 128/70    Weight from Last 3 Encounters:   10/04/18 213 lb (96.6 kg)   10/02/18 213 lb (96.6 kg)   08/23/18 211 lb (95.7 kg)              We Performed the Following     REMOVAL NAIL/NAIL BED, PARTIAL OR COMPLETE        Primary Care Provider Office Phone # Fax #    Darcy Duarte PA-C [phone] [address] [place] [address]        Equal Access to Services     [facility]: Hadii ann-marie alexander hadasho Soclara, waaxda luorestesadaha, qaybta kaalmada nina, nino doll . So [facility] [phone].    ATENCIÓN: Si habla español, tiene a bernal disposición servicios gratuitos de asistencia lingüística. [facility] [phone].    We comply with applicable federal civil rights laws and [place] laws. We do not discriminate on the basis of race, color, national origin, age, disability, sex, sexual orientation, or gender identity.            Thank you!     Thank you for choosing [facility]  for your care. Our goal is always to provide you with excellent care. Hearing back from our patients is one way we can continue to improve our services. Please take a few minutes to complete the written survey that you may receive in the mail after your visit with us. Thank you!             Your Updated Medication List - Protect others around you: Learn how to safely use, store and throw away your medicines at www.disposemymeds.org.          This list is accurate as of 10/4/18  8:04 AM.  Always use your most recent med list.                   Brand Name Dispense Instructions for use Diagnosis    albuterol 108 (90 Base) MCG/ACT inhaler    PROAIR HFA/PROVENTIL HFA/VENTOLIN HFA    2 Inhaler    Inhale 2 puffs into the lungs every 6 hours as needed for shortness of breath / dyspnea or wheezing    Mild persistent asthma without complication        budesonide-formoterol 160-4.5 MCG/ACT Inhaler    SYMBICORT    3 Inhaler    Inhale 2 puffs into the lungs 2 times daily    Mild persistent asthma without complication       cetirizine 10 MG tablet    ZYRTEC ALLERGY    90 tablet    Take 1 tablet (10 mg) by mouth daily    Seasonal allergic rhinitis, unspecified chronicity, unspecified trigger       citalopram 10 MG tablet    celeXA    90 tablet    Take 1 tablet (10 mg) by mouth daily    Post partum depression       hydrOXYzine 25 MG tablet    ATARAX    90 tablet    TAKE ONE TO TWO TABLETS BY MOUTH EVERY 4 HOURS AS NEEDED FOR ITCHING    Rash and nonspecific skin eruption       levonorgestrel 20 MCG/24HR IUD    MIRENA     1 each (20 mcg) by Intrauterine route continuous    Encounter for insertion of mirena IUD       mometasone 50 MCG/ACT spray    NASONEX    3 Box    Spray 2 sprays into both nostrils daily    Seasonal allergic rhinitis, unspecified chronicity, unspecified trigger

## 2018-10-04 NOTE — PROGRESS NOTES
HPI:  Kena Sorto is a 23 year old female who is seen in consultation at the request of ED Dept - Mikayla Marina MD.    Pt presents for eval of:   (Onset, Location, L/R, Character, Treatments, Injury if yes)     Onset early Sept 2018, Ingrown lateral Left great toenail  Constant, sharp, throbbing, redness, drainage, swelling, pain 5 > w/pressuer  Soaking w/epsom salt    Works as a PCA.    Weight management plan: Patient was referred to their PCP to discuss a diet and exercise plan.     Review of Systems:  Patient denies fever, chills, rash, wound, stiffness, limping, numbness, weakness, heart burn, blood in stool, chest pain with activity, calf pain when walking, shortness of breath with activity, chronic cough, easy bleeding/bruising, swelling of ankles, excessive thirst, fatigue, depression, anxiety.  Pt admits to the symptoms noted in history above.     PAST MEDICAL HISTORY:   Past Medical History:   Diagnosis Date     Chronic adenoiditis 3/3/2010     Encounter for insertion of mirena IUD 10/2/2018    Remove by 10/2/2023     Hypertrophy of nasal turbinates 3/3/2010     Iron deficiency anemia, unspecified     TREATED WITH IRON SUPPLEMENTS     Mild intermittent asthma      Plantar fasciitis 3/15/2012     Pneumonia, organism unspecified(486)     Pneumonia     Post partum depression 7/5/2018     Seasonal allergies         PAST SURGICAL HISTORY:   Past Surgical History:   Procedure Laterality Date     HC THERAPUTIC FRACTURE INFER TURBINATE  03/30/10     TONSILLECTOMY & ADENOIDECTOMY  03/30/10    turbinoplasty        MEDICATIONS:   Current Outpatient Prescriptions:      budesonide-formoterol (SYMBICORT) 160-4.5 MCG/ACT Inhaler, Inhale 2 puffs into the lungs 2 times daily, Disp: 3 Inhaler, Rfl: 3     cetirizine (ZYRTEC ALLERGY) 10 MG tablet, Take 1 tablet (10 mg) by mouth daily, Disp: 90 tablet, Rfl: 3     citalopram (CELEXA) 10 MG tablet, Take 1 tablet (10 mg) by mouth daily, Disp: 90 tablet, Rfl: 1      hydrOXYzine (ATARAX) 25 MG tablet, TAKE ONE TO TWO TABLETS BY MOUTH EVERY 4 HOURS AS NEEDED FOR ITCHING, Disp: 90 tablet, Rfl: 1     levonorgestrel (MIRENA) 20 MCG/24HR IUD, 1 each (20 mcg) by Intrauterine route continuous, Disp: , Rfl:      mometasone (NASONEX) 50 MCG/ACT spray, Spray 2 sprays into both nostrils daily, Disp: 3 Box, Rfl: 3     albuterol (PROAIR HFA/PROVENTIL HFA/VENTOLIN HFA) 108 (90 BASE) MCG/ACT Inhaler, Inhale 2 puffs into the lungs every 6 hours as needed for shortness of breath / dyspnea or wheezing, Disp: 2 Inhaler, Rfl: 3     ALLERGIES:    Allergies   Allergen Reactions     Keflex [Cephalexin Hcl] Hives     Penicillins      Prozac [Fluoxetine] Nausea     Patient requested a change        SOCIAL HISTORY:   Social History     Social History     Marital status: Single     Spouse name: N/A     Number of children: N/A     Years of education: N/A     Occupational History     Not on file.     Social History Main Topics     Smoking status: Never Smoker     Smokeless tobacco: Never Used      Comment: no smokers in the household     Alcohol use No     Drug use: No     Sexual activity: No     Other Topics Concern     Not on file     Social History Narrative    1/2018  Lives in [place] with Vaughn rust.  Vaughn smokes e-cigs.  Has two indoor cats.  Aware of toxoplasmosis precautions.  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  No concerns about domestic violence.        FAMILY HISTORY:   Family History   Problem Relation Age of Onset     Hypertension Mother      Hyperlipidemia Mother      GASTROINTESTINAL DISEASE Father      HEART DISEASE Father      Back Pain Father      Anxiety Disorder Sister      Depression Sister      Hearing Loss Sister      congenital     Coronary Artery Disease Maternal Grandmother      Cerebrovascular Disease Maternal Grandmother      Diabetes Maternal Grandfather      Type II     Cerebrovascular Disease Maternal Grandfather      Cancer Paternal Grandmother      lung (she was a smoker)      "Coronary Artery Disease Paternal Grandfather      aortic aneurysm        EXAM:Vitals: /72 (BP Location: Left arm, Cuff Size: Adult Large)  Temp 97.6  F (36.4  C) (Temporal)  Ht 5' 4\" (1.626 m)  Wt 213 lb (96.6 kg)  LMP 09/19/2018 (Approximate)  BMI 36.56 kg/m2  BMI= Body mass index is 36.56 kg/(m^2).    General appearance: Patient is alert and fully cooperative with history & exam.  No sign of distress is noted during the visit.     Psychiatric: Affect is pleasant & appropriate.  Patient appears motivated to improve health.     Respiratory: Breathing is regular & unlabored while sitting.     HEENT: Hearing is intact to spoken word.  Speech is clear.  No gross evidence of visual impairment that would impact ambulation.     Vascular: DP & PT pulses are intact & regular, CFT immediate, positive digital hair growth bilaterally.  No significant edema or varicosities noted and skin temperature is normal to both lower extremities.     Neurologic: Lower extremity sensation is intact to light touch.  No evidence of weakness or contracture in the lower extremities.  No evidence of neuropathy.    Dermatologic: Adequate texture, turgor and tone about the integument.  No discoloration or thickening of the toenail however the left lateral hallux nail border(s) are ingrown with localized erythema, discomfort and purulent drainage.     Musculoskeletal: Patient is ambulatory without assistive device or brace.  No gross ankle deformity noted.  No foot or ankle joint effusion is noted.     ASSESSMENT:       ICD-10-CM    1. Ingrowing nail L60.0 REMOVAL NAIL/NAIL BED, PARTIAL OR COMPLETE       Plan:   10/4/2018  We reviewed medical history and EPIC chart.  After obtaining informed consent to permanently remove the left lateral hallux nail(s), I utilized 3 cc of lidocaine plain to achieve local anesthesia per digit.  The toenails were then prepped with Betadine in usual fashion.  A quarter inch Penrose drain was then utilized " for hemostasis.  100% of the toenail border was avulsed utilizing a nail elevator, english anvil, 6100 blade and hemostat.  The proximal root portion of the nail was confirmed to be removed atraumatically.  Three applications of 89% phenol were applied to the surgical site for 30 seconds each followed by a curette after each application.  Surgical site was then flushed with alcohol and dressed with bacitracin and a nonadherent compression dressing.  Tourniquet was removed after approximately 3 minutes followed by immediate hyperemia to the distal aspect of the hallux.  Written postoperative instructions were dispensed and patient instructed to follow up in 1-2 weeks with any questions, pain,drainage, delayed healing or concerns.  I answered all questions to patients satisfaction.     If patient calls in the next 1-3 weeks with continued redness, pain or drainage I would recommend beginning oral Keflex 500 mg, 4 times a day ×10 days, after confirming there is no allergy.      Leonardo Lopez DPM

## 2018-10-05 ENCOUNTER — MYC MEDICAL ADVICE (OUTPATIENT)
Dept: FAMILY MEDICINE | Facility: CLINIC | Age: 23
End: 2018-10-05

## 2018-10-05 NOTE — TELEPHONE ENCOUNTER
mychart msg sent to pt to establish care with another provider to have medication adjustment made as Darcy Duarte is leaving clinic. Doris English, CMA

## 2018-10-11 ENCOUNTER — MYC MEDICAL ADVICE (OUTPATIENT)
Dept: SURGERY | Facility: CLINIC | Age: 23
End: 2018-10-11

## 2018-10-11 DIAGNOSIS — L60.0 INGROWING NAIL: Primary | ICD-10-CM

## 2018-10-11 RX ORDER — CLINDAMYCIN HCL 300 MG
300 CAPSULE ORAL 4 TIMES DAILY
Qty: 40 CAPSULE | Refills: 0 | Status: SHIPPED | OUTPATIENT
Start: 2018-10-11 | End: 2018-11-16

## 2018-10-11 NOTE — TELEPHONE ENCOUNTER
Spoke to patient and she is OK with cancelling her appointment today and trying clindamycin 300 mg, 4 times daily for 10 days. Requests [facility]. Wendy Styles CMA, October 11, 2018

## 2018-11-15 ENCOUNTER — TELEPHONE (OUTPATIENT)
Dept: FAMILY MEDICINE | Facility: CLINIC | Age: 23
End: 2018-11-15

## 2018-11-15 NOTE — TELEPHONE ENCOUNTER
I called pt about her appt tomorrow 11/16 and left a msg. She just had one it put on 10/2 and she really needs to give it more time if she is having problems with it. And I'm sure her insurance will not cover it. I also my charted her with this. Pt did call back and she wants to discuss her IUD and recheck depression.   Chantal Bland MA

## 2018-11-16 ENCOUNTER — OFFICE VISIT (OUTPATIENT)
Dept: FAMILY MEDICINE | Facility: CLINIC | Age: 23
End: 2018-11-16
Payer: COMMERCIAL

## 2018-11-16 VITALS
SYSTOLIC BLOOD PRESSURE: 116 MMHG | TEMPERATURE: 97 F | DIASTOLIC BLOOD PRESSURE: 70 MMHG | RESPIRATION RATE: 18 BRPM | HEART RATE: 107 BPM | BODY MASS INDEX: 36.39 KG/M2 | OXYGEN SATURATION: 98 % | WEIGHT: 212 LBS

## 2018-11-16 PROCEDURE — 99213 OFFICE O/P EST LOW 20 MIN: CPT | Performed by: FAMILY MEDICINE

## 2018-11-16 ASSESSMENT — PATIENT HEALTH QUESTIONNAIRE - PHQ9: SUM OF ALL RESPONSES TO PHQ QUESTIONS 1-9: 1

## 2018-11-16 ASSESSMENT — PAIN SCALES - GENERAL: PAINLEVEL: NO PAIN (0)

## 2018-11-16 NOTE — NURSING NOTE
"Chief Complaint   Patient presents with     Vaginal Bleeding     after IUD      Depression       Initial /70  Pulse 107  Temp 97  F (36.1  C) (Temporal)  Resp 18  Wt 212 lb (96.2 kg)  SpO2 98%  BMI 36.39 kg/m2 Estimated body mass index is 36.39 kg/(m^2) as calculated from the following:    Height as of 10/4/18: 5' 4\" (1.626 m).    Weight as of this encounter: 212 lb (96.2 kg).  BP completed using cuff size: levy Bland MA      "

## 2018-11-16 NOTE — MR AVS SNAPSHOT
After Visit Summary   11/16/2018    Kena Sorto    MRN: [id number]           Patient Information     Date Of Birth          1995        Visit Information        Provider Department      11/16/2018 10:40 AM Lance Tang MD [facility]        Today's Diagnoses     Post partum depression           Follow-ups after your visit        Who to contact     If you have questions or need follow up information about today's clinic visit or your schedule please contact [facility] directly at [phone].  Normal or non-critical lab and imaging results will be communicated to you by Grabbithart, letter or phone within 4 business days after the clinic has received the results. If you do not hear from us within 7 days, please contact the clinic through Ogonet or phone. If you have a critical or abnormal lab result, we will notify you by phone as soon as possible.  Submit refill requests through Hortor or call your pharmacy and they will forward the refill request to us. Please allow 3 business days for your refill to be completed.          Additional Information About Your Visit        MyChart Information     Hortor gives you secure access to your electronic health record. If you see a primary care provider, you can also send messages to your care team and make appointments. If you have questions, please call your primary care clinic.  If you do not have a primary care provider, please call [phone] and they will assist you.        Care EveryWhere ID     This is your Care EveryWhere ID. This could be used by other organizations to access your [facility] medical records  [id number]        Your Vitals Were     Pulse Temperature Respirations Pulse Oximetry BMI (Body Mass Index)       107 97  F (36.1  C) (Temporal) 18 98% 36.39 kg/m2        Blood Pressure from Last 3 Encounters:   11/16/18 116/70   10/04/18 120/72   10/02/18 120/80    Weight from Last 3 Encounters:    11/16/18 212 lb (96.2 kg)   10/04/18 213 lb (96.6 kg)   10/02/18 213 lb (96.6 kg)              Today, you had the following     No orders found for display         Where to get your medicines      These medications were sent to [facility] [address] [place] [address] - [address]  [address], [facility] [address]     Phone:  [phone]     citalopram 10 MG tablet          Primary Care Provider Office Phone # Fax #    Lance Tang [phone] [address] [place] [address]        Equal Access to Services     [facility]: Hadii ann-marie alexander hadalice Soclara, waaxda luorestesadaha, qaybta kaalmada benedictoyapablo, nino doll . [facility] [phone].    ATENCIÓN: Si habla español, tiene a bernal disposición servicios gratuitos de asistencia lingüística. [facility] [phone].    We comply with applicable federal civil rights laws and [place] laws. We do not discriminate on the basis of race, color, national origin, age, disability, sex, sexual orientation, or gender identity.            Thank you!     Thank you for choosing [facility]  for your care. Our goal is always to provide you with excellent care. Hearing back from our patients is one way we can continue to improve our services. Please take a few minutes to complete the written survey that you may receive in the mail after your visit with us. Thank you!             Your Updated Medication List - Protect others around you: Learn how to safely use, store and throw away your medicines at www.disposemymeds.org.          This list is accurate as of 11/16/18 11:59 PM.  Always use your most recent med list.                   Brand Name Dispense Instructions for use Diagnosis    albuterol 108 (90 Base) MCG/ACT inhaler    PROAIR HFA/PROVENTIL HFA/VENTOLIN HFA    2 Inhaler    Inhale 2 puffs into the lungs every 6 hours as needed for shortness of breath / dyspnea or wheezing    Mild persistent asthma without  complication       budesonide-formoterol 160-4.5 MCG/ACT Inhaler    SYMBICORT    3 Inhaler    Inhale 2 puffs into the lungs 2 times daily    Mild persistent asthma without complication       cetirizine 10 MG tablet    ZYRTEC ALLERGY    90 tablet    Take 1 tablet (10 mg) by mouth daily    Seasonal allergic rhinitis, unspecified chronicity, unspecified trigger       citalopram 10 MG tablet    celeXA    90 tablet    Take 1 tablet (10 mg) by mouth daily    Post partum depression       hydrOXYzine 25 MG tablet    ATARAX    90 tablet    TAKE ONE TO TWO TABLETS BY MOUTH EVERY 4 HOURS AS NEEDED FOR ITCHING    Rash and nonspecific skin eruption       levonorgestrel 20 MCG/24HR IUD    MIRENA     1 each (20 mcg) by Intrauterine route continuous    Encounter for insertion of mirena IUD       mometasone 50 MCG/ACT nasal spray    NASONEX    3 Box    Spray 2 sprays into both nostrils daily    Seasonal allergic rhinitis, unspecified chronicity, unspecified trigger

## 2018-11-16 NOTE — PROGRESS NOTES
SUBJECTIVE:                                                      Chief Complaint   Patient presents with     Vaginal Bleeding     after IUD      Depression        Depression Followup    Status since last visit: Stable     See PHQ-9 for current symptoms.  Other associated symptoms: None    Complicating factors:   Significant life event:  No   Current substance abuse:  None  Anxiety or Panic symptoms:  Yes-      PHQ 4/16/2018 7/26/2018 11/16/2018   PHQ-9 Total Score 7 5 1   Q9: Suicide Ideation Not at all Not at all Not at all       PHQ-9  English  PHQ-9   Any Language  Suicide Assessment Five-step Evaluation and Treatment (SAFE-T)    Amount of exercise or physical activity: None. At work     Problems taking medications regularly: No    Medication side effects: none    Diet: regular (no restrictions)      Vaginal Bleeding after IUD placement    Kena is a 23 year old comes in to discuss vaginal bleeding.  She has some scant bleeding occurs throughout the day.  This is after she had a Mirena placed a few weeks ago.  She may be soaks a half of a pad throughout the whole day.  No cramping.  No pain.    ROS:      OBJECTIVE:                                                    /70  Pulse 107  Temp 97  F (36.1  C) (Temporal)  Resp 18  Wt 212 lb (96.2 kg)  SpO2 98%  BMI 36.39 kg/m2  Body mass index is 36.39 kg/(m^2).    Well-appearing female.  No exam    Diagnostic Test Results:  none      ASSESSMENT/PLAN:                                                        ICD-10-CM    1. Post partum depression F53.0 citalopram (CELEXA) 10 MG tablet       Instructed that this is normal bleeding associated with Mirena.  Should give us a few months in order to see how it will go for her.  Given reassurance today.  Refill provided for Celexa.        Lance Tang MD  [facility]

## 2018-11-26 RX ORDER — CITALOPRAM HYDROBROMIDE 10 MG/1
10 TABLET ORAL DAILY
Qty: 90 TABLET | Refills: 1 | Status: SHIPPED | OUTPATIENT
Start: 2018-11-26 | End: 2019-05-30

## 2019-03-04 DIAGNOSIS — J45.30 MILD PERSISTENT ASTHMA WITHOUT COMPLICATION: ICD-10-CM

## 2019-03-06 RX ORDER — ALBUTEROL SULFATE 90 UG/1
2 AEROSOL, METERED RESPIRATORY (INHALATION) EVERY 6 HOURS PRN
Qty: 2 INHALER | Refills: 0 | Status: SHIPPED | OUTPATIENT
Start: 2019-03-06 | End: 2024-05-20

## 2019-03-06 NOTE — TELEPHONE ENCOUNTER
"Albuterol Inhaler  Last Written Prescription Date:  04/06/2017  Last Fill Quantity: 2 inhaler,  # refills: 3   Last office visit: 11/16/2018 with prescribing provider:  Clyde   Future Office Visit:   Next 5 appointments (look out 90 days)    Mar 20, 2019  9:20 AM CDT  Ayan Levy with Lance Tang MD  [facility] [address] [place] [id number] [phone]      Medication is being filled for 1 time refill only due to:  Patient has upcoming appointment.     Requested Prescriptions   Pending Prescriptions Disp Refills     albuterol (PROAIR HFA/PROVENTIL HFA/VENTOLIN HFA) 108 (90 Base) MCG/ACT inhaler 2 Inhaler 3     Sig: Inhale 2 puffs into the lungs every 6 hours as needed for shortness of breath / dyspnea or wheezing    Asthma Maintenance Inhalers - Anticholinergics Failed - 3/4/2019  9:38 AM       Failed - Asthma control assessment score within normal limits in last 6 months    Please review ACT score.        Passed - Patient is age 12 years or older       Passed - Medication is active on med list       Passed - Recent (6 mo) or future (30 days) visit within the authorizing provider's specialty    Patient had office visit in the last 6 months or has a visit in the next 30 days with authorizing provider or within the authorizing provider's specialty.  See \"Patient Info\" tab in inbasket, or \"Choose Columns\" in Meds & Orders section of the refill encounter.          ACT Total Scores 1/16/2018 2/19/2018 7/26/2018   ACT TOTAL SCORE - - -   ASTHMA ER VISITS - - -   ASTHMA HOSPITALIZATIONS - - -   ACT TOTAL SCORE (Goal Greater than or Equal to 20) 8 23 24   In the past 12 months, how many times did you visit the emergency room for your asthma without being admitted to the hospital? 0 0 0   In the past 12 months, how many times were you hospitalized overnight because of your asthma? 0 0 0     Sharlene Askew RN   "

## 2019-03-18 ENCOUNTER — TELEPHONE (OUTPATIENT)
Dept: FAMILY MEDICINE | Facility: CLINIC | Age: 24
End: 2019-03-18

## 2019-04-17 ENCOUNTER — HOSPITAL ENCOUNTER (EMERGENCY)
Facility: CLINIC | Age: 24
Discharge: HOME | End: 2019-04-17
Attending: FAMILY MEDICINE | Admitting: FAMILY MEDICINE
Payer: COMMERCIAL

## 2019-04-17 VITALS
DIASTOLIC BLOOD PRESSURE: 113 MMHG | HEART RATE: 115 BPM | RESPIRATION RATE: 16 BRPM | BODY MASS INDEX: 38.79 KG/M2 | TEMPERATURE: 97.8 F | WEIGHT: 226 LBS | OXYGEN SATURATION: 97 % | SYSTOLIC BLOOD PRESSURE: 146 MMHG

## 2019-04-17 DIAGNOSIS — J02.9 VIRAL PHARYNGITIS: ICD-10-CM

## 2019-04-17 LAB
DEPRECATED S PYO AG THROAT QL EIA: NORMAL
SPECIMEN SOURCE: NORMAL

## 2019-04-17 PROCEDURE — 99283 EMERGENCY DEPT VISIT LOW MDM: CPT | Performed by: FAMILY MEDICINE

## 2019-04-17 PROCEDURE — 87880 STREP A ASSAY W/OPTIC: CPT | Performed by: FAMILY MEDICINE

## 2019-04-17 PROCEDURE — 87081 CULTURE SCREEN ONLY: CPT | Performed by: FAMILY MEDICINE

## 2019-04-17 PROCEDURE — 99282 EMERGENCY DEPT VISIT SF MDM: CPT | Mod: Z6 | Performed by: FAMILY MEDICINE

## 2019-04-17 NOTE — ED AVS SNAPSHOT
[facility]  [address] [place] [address]  Phone:  [phone]  Fax:  [phone]                                    Kena Sorto   MRN: [id number]    Department:  [facility]   Date of Visit:  4/17/2019           After Visit Summary Signature Page    I have received my discharge instructions, and my questions have been answered. I have discussed any challenges I see with this plan with the nurse or doctor.    ..........................................................................................................................................  Patient/Patient Representative Signature      ..........................................................................................................................................  Patient Representative Print Name and Relationship to Patient    ..................................................               ................................................  Date                                   Time    ..........................................................................................................................................  Reviewed by Signature/Title    ...................................................              ..............................................  Date                                               Time          22EPIC Rev 08/18

## 2019-04-17 NOTE — ED PROVIDER NOTES
History     Chief Complaint   Patient presents with     Pharyngitis     HPI  Kena Sorto is a 24 year old female who presents with a 3-day history of severe sore throat.  Patient has had some low-grade fevers, T-max of 100.9.  Patient has had a runny nose but no cough.  Patient has had no nausea or vomiting.  Patient denies any ear pain or pressure.  Her daughter is sick with similar symptoms.    Allergies:  Allergies   Allergen Reactions     Keflex [Cephalexin Hcl] Hives     Penicillins      Prozac [Fluoxetine] Nausea     Patient requested a change       Problem List:    Patient Active Problem List    Diagnosis Date Noted     Encounter for insertion of mirena IUD 10/02/2018     Priority: Medium     Remove by 10/2/2023       Left hand paresthesia-4th finger 08/23/2018     Priority: Medium     Post partum depression 07/05/2018     Priority: Medium     Constipation, unspecified constipation type 04/11/2017     Priority: Medium     Seasonal allergic rhinitis 04/06/2017     Priority: Medium     Major depressive disorder, recurrent episode, mild (H) 08/24/2016     Priority: Medium     Mild persistent asthma without complication 08/24/2016     Priority: Medium     Geographic tongue 09/13/2012     Priority: Medium     Severe obesity (BMI 35.0-39.9) 05/02/2012     Priority: Medium     Chronic adenoiditis 03/03/2010     Priority: Medium     Per ENT visit       Hypertrophy of nasal turbinates 03/03/2010     Priority: Medium     Per ENT visit       Attention deficit disorder 01/31/2008     Priority: Medium     Problem list name updated by automated process. Provider to review       Generalized anxiety disorder 01/31/2008     Priority: Medium        Past Medical History:    Past Medical History:   Diagnosis Date     Chronic adenoiditis 3/3/2010     Encounter for insertion of mirena IUD 10/2/2018     Hypertrophy of nasal turbinates 3/3/2010     Iron deficiency anemia, unspecified      Mild intermittent asthma      Plantar  fasciitis 3/15/2012     Pneumonia, organism unspecified(486)      Post partum depression 7/5/2018     Seasonal allergies        Past Surgical History:    Past Surgical History:   Procedure Laterality Date     HC THERAPUTIC FRACTURE INFER TURBINATE  03/30/10     TONSILLECTOMY & ADENOIDECTOMY  03/30/10    turbinoplasty       Family History:    Family History   Problem Relation Age of Onset     Hypertension Mother      Hyperlipidemia Mother      Gastrointestinal Disease Father      Heart Disease Father      Back Pain Father      Anxiety Disorder Sister      Depression Sister      Hearing Loss Sister         congenital     Coronary Artery Disease Maternal Grandmother      Cerebrovascular Disease Maternal Grandmother      Diabetes Maternal Grandfather         Type II     Cerebrovascular Disease Maternal Grandfather      Cancer Paternal Grandmother         lung (she was a smoker)     Coronary Artery Disease Paternal Grandfather         aortic aneurysm       Social History:  Marital Status:  Single [1]  Social History     Tobacco Use     Smoking status: Never Smoker     Smokeless tobacco: Never Used     Tobacco comment: no smokers in the household   Substance Use Topics     Alcohol use: No     Drug use: No        Medications:      albuterol (PROAIR HFA/PROVENTIL HFA/VENTOLIN HFA) 108 (90 Base) MCG/ACT inhaler   budesonide-formoterol (SYMBICORT) 160-4.5 MCG/ACT Inhaler   cetirizine (ZYRTEC ALLERGY) 10 MG tablet   citalopram (CELEXA) 10 MG tablet   hydrOXYzine (ATARAX) 25 MG tablet   levonorgestrel (MIRENA) 20 MCG/24HR IUD   mometasone (NASONEX) 50 MCG/ACT spray         Review of Systems   All other systems reviewed and are negative.      Physical Exam   BP: (!) 146/113  Pulse: 115  Temp: 97.8  F (36.6  C)  Resp: 16  Weight: 102.5 kg (226 lb)  SpO2: 97 %      Physical Exam   Constitutional: She appears well-developed and well-nourished. No distress.   HENT:   Head: Normocephalic.   Right Ear: External ear normal.   Left Ear:  External ear normal.   Nose: Nose normal.   Mouth/Throat: Oropharynx is clear and moist. No oropharyngeal exudate.   Eyes: Conjunctivae are normal.   Neck: Normal range of motion.   Cardiovascular: Normal rate, regular rhythm and normal heart sounds.   No murmur heard.  Pulmonary/Chest: Effort normal and breath sounds normal. No respiratory distress. She has no wheezes. She has no rales.   Abdominal: Soft. Bowel sounds are normal. She exhibits no distension and no mass. There is no tenderness. There is no guarding.   Lymphadenopathy:     She has no cervical adenopathy.   Skin: She is not diaphoretic.   Nursing note and vitals reviewed.      ED Course        Procedures           Results for orders placed or performed during the hospital encounter of 04/17/19   Rapid strep screen   Result Value Ref Range    Specimen Description Throat     Rapid Strep A Screen       NEGATIVE: No Group A streptococcal antigen detected by immunoassay, await culture report.     Rapid strep was negative.  I think this is most likely a viral process.  Recommend continued symptomatic care including alternating Tylenol and ibuprofen.  Patient will continue to use warm salt water gargles and throat sprays.  Patient will follow-up if there is no improvement over the next 5 days.    Assessments & Plan (with Medical Decision Making)  Viral pharyngitis     I have reviewed the nursing notes.    I have reviewed the findings, diagnosis, plan and need for follow up with the patient.              4/17/2019   [facility] EMERGENCY DEPARTMENT     Maksim Farr MD  04/17/19 1921

## 2019-04-19 LAB
BACTERIA SPEC CULT: NORMAL
SPECIMEN SOURCE: NORMAL

## 2019-07-23 ENCOUNTER — TELEPHONE (OUTPATIENT)
Dept: FAMILY MEDICINE | Facility: CLINIC | Age: 24
End: 2019-07-23

## 2019-07-23 DIAGNOSIS — J45.30 MILD PERSISTENT ASTHMA WITHOUT COMPLICATION: Primary | ICD-10-CM

## 2019-07-23 NOTE — TELEPHONE ENCOUNTER
See my chart message  Message     Appointment Request From: Kena Sorto      With Provider: Lance Tang MD [-Primary Care Physician-]      Preferred Date Range: From 7/23/2019 To 7/31/2019      Preferred Times: Any      Reason: To address the following health maintenance concerns.   Preventive Care Visit   Asthma Action Plan   Asthma Control Test   Phq-9   Chlamydia Screening   Pap   Influenza Vaccine      Comments:   Follow up care

## 2019-07-23 NOTE — LETTER
[address] [address]  [phone]        July 24, 2019    Kena Sorto  [address] [place] [address]          Dear Kena,    You are not due for your pap until 01/2020, but can do it early but you will have to wait for our next available for your physical.  Please call us at the number above and we can assist in getting you scheduled for that.  Please let us know if you have any further questions.    Sincerely,        Lance Tang MD, jrm, cma

## 2019-07-23 NOTE — TELEPHONE ENCOUNTER
Pt is not due for her pap until 1/20, but can do it early but she will have to wait for our next available for her physical . I can my chart her with the ACT for her asthma for her to do before her appt.  Chantal Bland MA

## 2019-07-23 NOTE — TELEPHONE ENCOUNTER
Left message for patient to call back and speak with any .        Luda Kerr ~ Patient Representative  [address] [address]  [email or web address]  [email or web address]  Office:  [phone]  Fax:  [phone]

## 2019-07-24 NOTE — TELEPHONE ENCOUNTER
2nd attempt to reach pt- left another message. Routing back to team to send [employer]Wea[employer] message or letter.  Thank you,  Sofie Hatfield- Patient Representative

## 2019-08-29 RX ORDER — CITALOPRAM HYDROBROMIDE 10 MG/1
TABLET ORAL
Qty: 90 TABLET | Refills: 1 | OUTPATIENT
Start: 2019-08-29

## 2019-08-29 NOTE — TELEPHONE ENCOUNTER
"Medication denied, 6 month supply given on 05/31/2019    Celexa  Last Written Prescription Date:  05/31/2019  Last Fill Quantity: 90,  # refills: 1   Last office visit: 11/16/2018 with prescribing provider:  Clyde   Future Office Visit:  None    Requested Prescriptions   Pending Prescriptions Disp Refills     citalopram (CELEXA) 10 MG tablet [Pharmacy Med Name: CITALOPRAM HYDROBROMIDE 10MG TABS] 90 tablet 1     Sig: TAKE ONE TABLET BY MOUTH ONCE DAILY       SSRIs Protocol Failed - 8/29/2019  1:05 AM        Failed - PHQ-9 score less than 5 in past 6 months     Please review last PHQ-9 score.         Failed - Recent (6 mo) or future (30 days) visit within the authorizing provider's specialty     Patient had office visit in the last 6 months or has a visit in the next 30 days with authorizing provider or within the authorizing provider's specialty.  See \"Patient Info\" tab in inbasket, or \"Choose Columns\" in Meds & Orders section of the refill encounter.          Passed - Medication is active on med list        Passed - Patient is age 18 or older        Passed - No active pregnancy on record        Passed - No positive pregnancy test in last 12 months      PHQ-9 score:    PHQ-9 SCORE 11/16/2018   PHQ-9 Total Score -   PHQ-9 Total Score 1     Sharlene Askew RN   "

## 2019-09-10 ENCOUNTER — OFFICE VISIT (OUTPATIENT)
Dept: INTERNAL MEDICINE | Facility: CLINIC | Age: 24
End: 2019-09-10
Payer: COMMERCIAL

## 2019-09-10 VITALS
BODY MASS INDEX: 42.12 KG/M2 | WEIGHT: 246.7 LBS | HEIGHT: 64 IN | HEART RATE: 90 BPM | SYSTOLIC BLOOD PRESSURE: 124 MMHG | DIASTOLIC BLOOD PRESSURE: 80 MMHG | OXYGEN SATURATION: 97 % | RESPIRATION RATE: 16 BRPM | TEMPERATURE: 97.1 F

## 2019-09-10 DIAGNOSIS — N89.8 VAGINAL ODOR: ICD-10-CM

## 2019-09-10 DIAGNOSIS — B37.31 YEAST INFECTION OF THE VAGINA: Primary | ICD-10-CM

## 2019-09-10 LAB
SPECIMEN SOURCE: NORMAL
WET PREP SPEC: NORMAL

## 2019-09-10 PROCEDURE — 87210 SMEAR WET MOUNT SALINE/INK: CPT | Performed by: INTERNAL MEDICINE

## 2019-09-10 PROCEDURE — 99213 OFFICE O/P EST LOW 20 MIN: CPT | Performed by: INTERNAL MEDICINE

## 2019-09-10 RX ORDER — FLUCONAZOLE 150 MG/1
150 TABLET ORAL ONCE
Qty: 1 TABLET | Refills: 0 | Status: SHIPPED | OUTPATIENT
Start: 2019-09-10 | End: 2019-12-27

## 2019-09-10 ASSESSMENT — ASTHMA QUESTIONNAIRES
QUESTION_3 LAST FOUR WEEKS HOW OFTEN DID YOUR ASTHMA SYMPTOMS (WHEEZING, COUGHING, SHORTNESS OF BREATH, CHEST TIGHTNESS OR PAIN) WAKE YOU UP AT NIGHT OR EARLIER THAN USUAL IN THE MORNING: NOT AT ALL
QUESTION_1 LAST FOUR WEEKS HOW MUCH OF THE TIME DID YOUR ASTHMA KEEP YOU FROM GETTING AS MUCH DONE AT WORK, SCHOOL OR AT HOME: NONE OF THE TIME
ACT_TOTALSCORE: 25
QUESTION_5 LAST FOUR WEEKS HOW WOULD YOU RATE YOUR ASTHMA CONTROL: COMPLETELY CONTROLLED
QUESTION_4 LAST FOUR WEEKS HOW OFTEN HAVE YOU USED YOUR RESCUE INHALER OR NEBULIZER MEDICATION (SUCH AS ALBUTEROL): NOT AT ALL
ACUTE_EXACERBATION_TODAY: NO
QUESTION_2 LAST FOUR WEEKS HOW OFTEN HAVE YOU HAD SHORTNESS OF BREATH: NOT AT ALL

## 2019-09-10 ASSESSMENT — PAIN SCALES - GENERAL: PAINLEVEL: NO PAIN (0)

## 2019-09-10 ASSESSMENT — MIFFLIN-ST. JEOR: SCORE: 1864.02

## 2019-09-10 ASSESSMENT — PATIENT HEALTH QUESTIONNAIRE - PHQ9: SUM OF ALL RESPONSES TO PHQ QUESTIONS 1-9: 0

## 2019-09-10 NOTE — PROGRESS NOTES
"Subjective     Kena Sorto is a 24 year old female who presents to clinic today for the following health issues:    HPI   Vaginal Symptoms  Onset: 2 weeks    Description:  Vaginal Discharge: white creamy    Itching (Pruritis): YES  Burning sensation:  YES  Odor: YES    Accompanying Signs & Symptoms:  Pain with Urination: YES  Abdominal Pain: no   Fever: no     History:   Sexually active: YES  New Partner: no   Possibility of Pregnancy:  No    Precipitating factors:   Recent Antibiotic Use: no     Alleviating factors:  none    Therapies Tried and outcome: monistat and it made it worse    Vaginal symptoms for a few weeks, she thinks it is yeast infection.  Has happened before.  Monistat made it itch and burn.      Patient denies STDs, relationship with fiancee.    Past Medical History:   Diagnosis Date     Chronic adenoiditis 3/3/2010     Encounter for insertion of mirena IUD 10/2/2018    Remove by 10/2/2023     Hypertrophy of nasal turbinates 3/3/2010     Iron deficiency anemia, unspecified     TREATED WITH IRON SUPPLEMENTS     Mild intermittent asthma      Plantar fasciitis 3/15/2012     Pneumonia, organism unspecified(486)     Pneumonia     Post partum depression 7/5/2018     Seasonal allergies      Current Outpatient Medications   Medication     albuterol (PROAIR HFA/PROVENTIL HFA/VENTOLIN HFA) 108 (90 Base) MCG/ACT inhaler     budesonide-formoterol (SYMBICORT) 160-4.5 MCG/ACT Inhaler     cetirizine (ZYRTEC ALLERGY) 10 MG tablet     citalopram (CELEXA) 10 MG tablet     fluconazole (DIFLUCAN) 150 MG tablet     hydrOXYzine (ATARAX) 25 MG tablet     levonorgestrel (MIRENA) 20 MCG/24HR IUD     mometasone (NASONEX) 50 MCG/ACT spray     No current facility-administered medications for this visit.      Physical Exam  /80 (BP Location: Right arm, Patient Position: Sitting, Cuff Size: Adult Large)   Pulse 90   Temp 97.1  F (36.2  C) (Temporal)   Resp 16   Ht 1.626 m (5' 4\")   Wt 111.9 kg (246 lb 11.2 oz)   " SpO2 97%   Breastfeeding? No   BMI 42.35 kg/m    General Appearance-healthy, alert, no distress    ASSESSMENT:  24-year-old female who is engaged.  She has had one child a year ago.  She comes in with some vaginal discharge and irritation which appears to be a yeast infection.  Her wet prep was done by herself and was negative for yeast the patient still thinks it is yeast has a white discharge.  She is on inhaled steroids and nasal steroids which could be a risk factor.  She is had yeast infections before.  We will try treating her with Diflucan.  If she does not get better recommended she see her primary for full pelvic examination she agrees and does not want a pelvic exam today.    Electronically signed by Sudhakar Jarrell MD

## 2019-09-11 ASSESSMENT — ASTHMA QUESTIONNAIRES: ACT_TOTALSCORE: 25

## 2019-10-26 ENCOUNTER — HEALTH MAINTENANCE LETTER (OUTPATIENT)
Age: 24
End: 2019-10-26

## 2019-12-27 ENCOUNTER — OFFICE VISIT (OUTPATIENT)
Dept: FAMILY MEDICINE | Facility: CLINIC | Age: 24
End: 2019-12-27
Payer: COMMERCIAL

## 2019-12-27 VITALS
TEMPERATURE: 97.3 F | BODY MASS INDEX: 43.26 KG/M2 | DIASTOLIC BLOOD PRESSURE: 72 MMHG | SYSTOLIC BLOOD PRESSURE: 122 MMHG | WEIGHT: 252 LBS | HEART RATE: 96 BPM | RESPIRATION RATE: 18 BRPM | OXYGEN SATURATION: 98 %

## 2019-12-27 DIAGNOSIS — N76.0 BV (BACTERIAL VAGINOSIS): ICD-10-CM

## 2019-12-27 DIAGNOSIS — B96.89 BV (BACTERIAL VAGINOSIS): ICD-10-CM

## 2019-12-27 DIAGNOSIS — N89.8 VAGINAL DISCHARGE: Primary | ICD-10-CM

## 2019-12-27 DIAGNOSIS — J30.2 SEASONAL ALLERGIC RHINITIS, UNSPECIFIED TRIGGER: ICD-10-CM

## 2019-12-27 LAB
SPECIMEN SOURCE: ABNORMAL
WET PREP SPEC: ABNORMAL

## 2019-12-27 PROCEDURE — 58301 REMOVE INTRAUTERINE DEVICE: CPT | Performed by: FAMILY MEDICINE

## 2019-12-27 PROCEDURE — 99213 OFFICE O/P EST LOW 20 MIN: CPT | Mod: 25 | Performed by: FAMILY MEDICINE

## 2019-12-27 PROCEDURE — 87210 SMEAR WET MOUNT SALINE/INK: CPT | Performed by: FAMILY MEDICINE

## 2019-12-27 RX ORDER — MOMETASONE FUROATE MONOHYDRATE 50 UG/1
2 SPRAY, METERED NASAL DAILY
Qty: 3 BOX | Refills: 3 | Status: SHIPPED | OUTPATIENT
Start: 2019-12-27 | End: 2020-09-02

## 2019-12-27 RX ORDER — METRONIDAZOLE 500 MG/1
500 TABLET ORAL 2 TIMES DAILY
Qty: 14 TABLET | Refills: 0 | Status: SHIPPED | OUTPATIENT
Start: 2019-12-27 | End: 2020-02-05

## 2019-12-27 ASSESSMENT — PAIN SCALES - GENERAL: PAINLEVEL: NO PAIN (0)

## 2019-12-27 NOTE — PROGRESS NOTES
ub[facility]     Vaginal Symptoms/ Remove IUD   Onset: months     Description:  Vaginal Discharge: white clear creamy curd-like   Itching (Pruritis): YES  Burning sensation:  no   Odor: YES    Accompanying Signs & Symptoms:  Pain with Urination: no   Abdominal Pain: no   Fever: no     History:   Sexually active: YES  New Partner: no   Possibility of Pregnancy:  No    Precipitating factors:   Recent Antibiotic Use: no     Alleviating factors:  None     Therapies Tried and outcome: none     Kena is a 24 year old female who comes to clinic     Review of Systems         Objective    /72   Pulse 96   Temp 97.3  F (36.3  C) (Temporal)   Resp 18   Wt 114.3 kg (252 lb)   SpO2 98%   BMI 43.26 kg/m       Wt Readings from Last 2 Encounters:   12/27/19 114.3 kg (252 lb)   09/10/19 111.9 kg (246 lb 11.2 oz)       Physical Exam   Well-appearing fields no distress.  External vagina normal in appearance.  Speculum placed easily.  The strings were easily visible and grasped and removed.  The IUD was moved in its entirety.  There is a thick whitish creamy discharge present with an odor.    Diagnostic Test Results:  Labs reviewed in Epic  Results for orders placed or performed in visit on 12/27/19 (from the past 24 hour(s))   Wet prep   Result Value Ref Range    Specimen Description Vagina     Wet Prep Moderate  PMNs seen       Wet Prep Few  Clue cells seen   (A)     Wet Prep No Trichomonas seen     Wet Prep No yeast seen            Assessment & Plan       ICD-10-CM    1. Vaginal discharge N89.8 Wet prep   2. Seasonal allergic rhinitis, unspecified trigger J30.2 mometasone (NASONEX) 50 MCG/ACT nasal spray   3. BV (bacterial vaginosis) N76.0 metroNIDAZOLE (FLAGYL) 500 MG tablet    B96.89        IUD removed.  Start prenatal.  Discussed birth control.  She is sexually active with her  and they will use condoms for now.  She is still considering pregnancy.  Clue cells noted and BV will be treated with Flagyl.  See her back  as needed    Lance Tang MD  [facility]

## 2020-01-07 ENCOUNTER — E-VISIT (OUTPATIENT)
Dept: FAMILY MEDICINE | Facility: CLINIC | Age: 25
End: 2020-01-07
Payer: COMMERCIAL

## 2020-01-07 DIAGNOSIS — Z53.9 ERRONEOUS ENCOUNTER--DISREGARD: Primary | ICD-10-CM

## 2020-01-07 PROCEDURE — 99207 ZZC NO BILLABLE SERVICE THIS VISIT: CPT | Performed by: FAMILY MEDICINE

## 2020-01-13 ENCOUNTER — E-VISIT (OUTPATIENT)
Dept: FAMILY MEDICINE | Facility: CLINIC | Age: 25
End: 2020-01-13
Payer: COMMERCIAL

## 2020-01-13 DIAGNOSIS — Z53.9 ERRONEOUS ENCOUNTER--DISREGARD: Primary | ICD-10-CM

## 2020-01-13 PROCEDURE — 99207 ZZC NO BILLABLE SERVICE THIS VISIT: CPT | Performed by: FAMILY MEDICINE

## 2020-01-23 ENCOUNTER — MYC MEDICAL ADVICE (OUTPATIENT)
Dept: FAMILY MEDICINE | Facility: CLINIC | Age: 25
End: 2020-01-23

## 2020-02-05 ENCOUNTER — OFFICE VISIT (OUTPATIENT)
Dept: FAMILY MEDICINE | Facility: CLINIC | Age: 25
End: 2020-02-05
Payer: COMMERCIAL

## 2020-02-05 VITALS
DIASTOLIC BLOOD PRESSURE: 82 MMHG | BODY MASS INDEX: 42.85 KG/M2 | HEIGHT: 64 IN | SYSTOLIC BLOOD PRESSURE: 122 MMHG | TEMPERATURE: 96.6 F | HEART RATE: 120 BPM | WEIGHT: 251 LBS | RESPIRATION RATE: 18 BRPM

## 2020-02-05 DIAGNOSIS — Z30.09 BIRTH CONTROL COUNSELING: ICD-10-CM

## 2020-02-05 DIAGNOSIS — R21 RASH AND NONSPECIFIC SKIN ERUPTION: ICD-10-CM

## 2020-02-05 DIAGNOSIS — Z00.00 ENCOUNTER FOR PHYSICAL EXAMINATION: Primary | ICD-10-CM

## 2020-02-05 DIAGNOSIS — J45.30 MILD PERSISTENT ASTHMA WITHOUT COMPLICATION: ICD-10-CM

## 2020-02-05 PROCEDURE — 96127 BRIEF EMOTIONAL/BEHAV ASSMT: CPT | Performed by: FAMILY MEDICINE

## 2020-02-05 PROCEDURE — 87591 N.GONORRHOEAE DNA AMP PROB: CPT | Performed by: FAMILY MEDICINE

## 2020-02-05 PROCEDURE — 99395 PREV VISIT EST AGE 18-39: CPT | Performed by: FAMILY MEDICINE

## 2020-02-05 PROCEDURE — 99213 OFFICE O/P EST LOW 20 MIN: CPT | Mod: 25 | Performed by: FAMILY MEDICINE

## 2020-02-05 PROCEDURE — 87491 CHLMYD TRACH DNA AMP PROBE: CPT | Performed by: FAMILY MEDICINE

## 2020-02-05 PROCEDURE — G0145 SCR C/V CYTO,THINLAYER,RESCR: HCPCS | Performed by: FAMILY MEDICINE

## 2020-02-05 RX ORDER — CITALOPRAM HYDROBROMIDE 10 MG/1
20 TABLET ORAL DAILY
Qty: 90 TABLET | Refills: 1 | Status: SHIPPED | OUTPATIENT
Start: 2020-02-05 | End: 2020-03-13

## 2020-02-05 RX ORDER — BUDESONIDE AND FORMOTEROL FUMARATE DIHYDRATE 160; 4.5 UG/1; UG/1
2 AEROSOL RESPIRATORY (INHALATION) 2 TIMES DAILY
Qty: 3 INHALER | Refills: 3 | Status: SHIPPED | OUTPATIENT
Start: 2020-02-05 | End: 2020-10-12

## 2020-02-05 RX ORDER — ETONOGESTREL AND ETHINYL ESTRADIOL VAGINAL RING .015; .12 MG/D; MG/D
1 RING VAGINAL
Qty: 3 EACH | Refills: 0 | Status: SHIPPED | OUTPATIENT
Start: 2020-02-05 | End: 2021-03-09

## 2020-02-05 RX ORDER — HYDROXYZINE HYDROCHLORIDE 25 MG/1
TABLET, FILM COATED ORAL
Qty: 90 TABLET | Refills: 1 | Status: CANCELLED | OUTPATIENT
Start: 2020-02-05

## 2020-02-05 ASSESSMENT — ENCOUNTER SYMPTOMS
FEVER: 0
FREQUENCY: 0
CONSTIPATION: 0
ARTHRALGIAS: 0
NAUSEA: 0
HEMATURIA: 0
DIARRHEA: 0
WEAKNESS: 0
DYSURIA: 0
PALPITATIONS: 0
CHILLS: 0
MYALGIAS: 0
SHORTNESS OF BREATH: 1
PARESTHESIAS: 0
ABDOMINAL PAIN: 0
COUGH: 1
SORE THROAT: 0
JOINT SWELLING: 0
BREAST MASS: 0
NERVOUS/ANXIOUS: 1
HEADACHES: 1
HEMATOCHEZIA: 0
DIZZINESS: 0
EYE PAIN: 0
HEARTBURN: 0

## 2020-02-05 ASSESSMENT — PATIENT HEALTH QUESTIONNAIRE - PHQ9
10. IF YOU CHECKED OFF ANY PROBLEMS, HOW DIFFICULT HAVE THESE PROBLEMS MADE IT FOR YOU TO DO YOUR WORK, TAKE CARE OF THINGS AT HOME, OR GET ALONG WITH OTHER PEOPLE: SOMEWHAT DIFFICULT
SUM OF ALL RESPONSES TO PHQ QUESTIONS 1-9: 12
SUM OF ALL RESPONSES TO PHQ QUESTIONS 1-9: 12

## 2020-02-05 ASSESSMENT — PAIN SCALES - GENERAL: PAINLEVEL: NO PAIN (0)

## 2020-02-05 ASSESSMENT — MIFFLIN-ST. JEOR: SCORE: 1877.18

## 2020-02-05 NOTE — PROGRESS NOTES
SUBJECTIVE:   CC: Kena Sorto is an 24 year old woman who presents for preventive health visit.     Healthy Habits:     Getting at least 3 servings of Calcium per day:  Yes    Bi-annual eye exam:  NO    Dental care twice a year:  NO    Sleep apnea or symptoms of sleep apnea:  Daytime drowsiness and Excessive snoring    Diet:  Regular (no restrictions)    Frequency of exercise:  2-3 days/week    Duration of exercise:  45-60 minutes    Taking medications regularly:  Yes    Medication side effects:  None    PHQ-2 Total Score: 3    Additional concerns today:  Yes    PHQ-9 SCORE 11/16/2018 9/10/2019 2/5/2020   PHQ-9 Total Score - - -   PHQ-9 Total Score MyChart - - 12 (Moderate depression)   PHQ-9 Total Score 1 0 12       No flowsheet data found.     Mood going down  Breathing good, very rare use of albuterol  Has                          Today's PHQ-2 Score:   PHQ-2 ( 1999 Pfizer) 2/5/2020   Q1: Little interest or pleasure in doing things 2   Q2: Feeling down, depressed or hopeless 1   PHQ-2 Score 3   Q1: Little interest or pleasure in doing things More than half the days   Q2: Feeling down, depressed or hopeless Several days   PHQ-2 Score 3       Abuse: Current or Past(Physical, Sexual or Emotional)- No  Do you feel safe in your environment? Yes        Social History     Tobacco Use     Smoking status: Never Smoker     Smokeless tobacco: Never Used     Tobacco comment: no smokers in the household   Substance Use Topics     Alcohol use: No     If you drink alcohol do you typically have >3 drinks per day or >7 drinks per week? No    Alcohol Use 2/5/2020   Prescreen: >3 drinks/day or >7 drinks/week? No   Prescreen: >3 drinks/day or >7 drinks/week? -   No flowsheet data found.    Reviewed orders with patient.  Reviewed health maintenance and updated orders accordingly -           Pertinent mammograms are reviewed under the imaging tab.  History of abnormal Pap smear:      Reviewed and updated as needed  "this visit by clinical staff  Tobacco  Allergies  Meds         Reviewed and updated as needed this visit by Provider            Review of Systems   Constitutional: Negative for chills and fever.   HENT: Positive for congestion and ear pain. Negative for hearing loss and sore throat.    Eyes: Negative for pain and visual disturbance.   Respiratory: Positive for cough and shortness of breath.    Cardiovascular: Negative for chest pain, palpitations and peripheral edema.   Gastrointestinal: Negative for abdominal pain, constipation, diarrhea, heartburn, hematochezia and nausea.   Breasts:  Positive for tenderness. Negative for breast mass and discharge.   Genitourinary: Positive for vaginal discharge. Negative for dysuria, frequency, genital sores, hematuria, pelvic pain, urgency and vaginal bleeding.   Musculoskeletal: Negative for arthralgias, joint swelling and myalgias.   Skin: Negative for rash.   Neurological: Positive for headaches. Negative for dizziness, weakness and paresthesias.   Psychiatric/Behavioral: Positive for mood changes. The patient is nervous/anxious.           OBJECTIVE:   /82   Pulse 120   Temp 96.6  F (35.9  C) (Temporal)   Resp 18   Ht 1.615 m (5' 3.6\")   Wt 113.9 kg (251 lb)   LMP 01/22/2020 (Approximate)   BMI 43.63 kg/m    Physical Exam  GENERAL: healthy, alert and no distress  EYES: Eyes grossly normal to inspection, PERRL and conjunctivae and sclerae normal  HENT: ear canals and TM's normal, nose and mouth without ulcers or lesions  NECK: no adenopathy, no asymmetry, masses, or scars and thyroid normal to palpation  RESP: lungs clear to auscultation - no rales, rhonchi or wheezes  BREAST: normal without masses, tenderness or nipple discharge and no palpable axillary masses or adenopathy  CV: regular rate and rhythm, normal S1 S2, no S3 or S4, no murmur, click or rub, no peripheral edema and peripheral pulses strong  ABDOMEN: soft, nontender, no hepatosplenomegaly, no " "masses and bowel sounds normal  MS: no gross musculoskeletal defects noted, no edema  SKIN: no suspicious lesions or rashes  NEURO: Normal strength and tone, mentation intact and speech normal  PSYCH: mentation appears normal, affect normal/bright    Diagnostic Test Results:  Labs reviewed in Epic    ASSESSMENT/PLAN:       ICD-10-CM    1. Encounter for physical examination Z00.00 Pap imaged thin layer screen only - recommended age 21 - 24 years     Neisseria gonorrhoeae PCR     Chlamydia trachomatis PCR   2. Mild persistent asthma without complication J45.30 budesonide-formoterol (SYMBICORT) 160-4.5 MCG/ACT Inhaler   3. Rash and nonspecific skin eruption R21    4. Post partum depression O99.345 citalopram (CELEXA) 10 MG tablet    F53.0    5. Birth control counseling Z30.09 etonogestrel-ethinyl estradiol (NUVARING) 0.12-0.015 MG/24HR vaginal ring   Looking for new birth control.  After discussion of options, elected for the NuvaRing.  Discussed risk benefits side effects.  Due for routine screening test.  Refills provided for stable persistent asthma.  Also stable depression symptoms.  No changes.    COUNSELING:  Reviewed preventive health counseling, as reflected in patient instructions    Estimated body mass index is 43.63 kg/m  as calculated from the following:    Height as of this encounter: 1.615 m (5' 3.6\").    Weight as of this encounter: 113.9 kg (251 lb).         reports that she has never smoked. She has never used smokeless tobacco.      Counseling Resources:  ATP IV Guidelines  Pooled Cohorts Equation Calculator  Breast Cancer Risk Calculator  FRAX Risk Assessment  ICSI Preventive Guidelines  Dietary Guidelines for Americans, 2010  USDA's MyPlate  ASA Prophylaxis  Lung CA Screening    Lance Tang MD  [facility]  "

## 2020-02-06 LAB
C TRACH DNA SPEC QL NAA+PROBE: NEGATIVE
N GONORRHOEA DNA SPEC QL NAA+PROBE: NEGATIVE
SPECIMEN SOURCE: NORMAL
SPECIMEN SOURCE: NORMAL

## 2020-02-06 ASSESSMENT — PATIENT HEALTH QUESTIONNAIRE - PHQ9: SUM OF ALL RESPONSES TO PHQ QUESTIONS 1-9: 12

## 2020-02-07 PROBLEM — Z30.430 ENCOUNTER FOR INSERTION OF MIRENA IUD: Status: RESOLVED | Noted: 2018-10-02 | Resolved: 2020-02-07

## 2020-02-07 PROBLEM — K59.00 CONSTIPATION, UNSPECIFIED CONSTIPATION TYPE: Status: RESOLVED | Noted: 2017-04-11 | Resolved: 2020-02-07

## 2020-02-07 LAB
COPATH REPORT: NORMAL
PAP: NORMAL

## 2020-02-26 ENCOUNTER — E-VISIT (OUTPATIENT)
Dept: FAMILY MEDICINE | Facility: CLINIC | Age: 25
End: 2020-02-26
Payer: COMMERCIAL

## 2020-02-26 DIAGNOSIS — N76.0 BACTERIAL VAGINOSIS: ICD-10-CM

## 2020-02-26 DIAGNOSIS — B96.89 BACTERIAL VAGINOSIS: ICD-10-CM

## 2020-02-26 DIAGNOSIS — N89.8 VAGINAL DISCHARGE: Primary | ICD-10-CM

## 2020-02-26 PROCEDURE — 99207 ZZC NON-BILLABLE SERV PER CHARTING: CPT | Performed by: FAMILY MEDICINE

## 2020-03-13 RX ORDER — CITALOPRAM HYDROBROMIDE 10 MG/1
TABLET ORAL
Qty: 90 TABLET | Refills: 2 | Status: SHIPPED | OUTPATIENT
Start: 2020-03-13 | End: 2021-03-09

## 2020-03-13 NOTE — TELEPHONE ENCOUNTER
"Celexa  Last Written Prescription Date:  02/05/2019  Last Fill Quantity: 90,  # refills: 1   Last office visit: 2/5/2020 with prescribing provider:  Clyde   Future Office Visit:  None  Routing refill request to provider for review/approval because:  Elevated PHQ 9 score    Requested Prescriptions   Pending Prescriptions Disp Refills     citalopram (CELEXA) 10 MG tablet [Pharmacy Med Name: CITALOPRAM HYDROBROMIDE 10MG TABS] 90 tablet 1     Sig: TAKE ONE TABLET BY MOUTH ONCE DAILY       SSRIs Protocol Failed - 3/13/2020  8:41 AM        Failed - PHQ-9 score less than 5 in past 6 months     Please review last PHQ-9 score.         Passed - Medication is active on med list        Passed - Patient is age 18 or older        Passed - No active pregnancy on record        Passed - No positive pregnancy test in last 12 months        Passed - Recent (6 mo) or future (30 days) visit within the authorizing provider's specialty     Patient had office visit in the last 6 months or has a visit in the next 30 days with authorizing provider or within the authorizing provider's specialty.  See \"Patient Info\" tab in inbasket, or \"Choose Columns\" in Meds & Orders section of the refill encounter.           PHQ-9 score:    PHQ 2/5/2020   PHQ-9 Total Score 12   Q9: Thoughts of better off dead/self-harm past 2 weeks Not at all     Sharlene Askew RN     "

## 2020-05-18 ENCOUNTER — E-VISIT (OUTPATIENT)
Dept: FAMILY MEDICINE | Facility: CLINIC | Age: 25
End: 2020-05-18
Payer: COMMERCIAL

## 2020-05-18 DIAGNOSIS — J02.9 PHARYNGITIS, UNSPECIFIED ETIOLOGY: Primary | ICD-10-CM

## 2020-05-18 PROCEDURE — 99421 OL DIG E/M SVC 5-10 MIN: CPT | Performed by: FAMILY MEDICINE

## 2020-05-18 NOTE — TELEPHONE ENCOUNTER
Patient returned call she stated that it is the left side of her Throat that is sore feels like there is a lump.  She has NO fever  No Chills  No exposure   No shortness of breath.  Dry cough due to dryness feeling in her throat-  Hurts to swallow feels like there is a lump in her throat.   Inge VILLEDA

## 2020-05-18 NOTE — TELEPHONE ENCOUNTER
Provider E-Visit time total (minutes): 5    I tried to call her to get more information but it went directly into her voice mail.  Left a message to call back - need to find out more about the nature of the ST.    How long?  Associated symptoms?  URI? Fever? Chill?  Coughing?  Exposure?  Breathing problem?    Thanks

## 2020-05-27 ENCOUNTER — E-VISIT (OUTPATIENT)
Dept: FAMILY MEDICINE | Facility: CLINIC | Age: 25
End: 2020-05-27
Payer: COMMERCIAL

## 2020-05-27 ENCOUNTER — E-VISIT (OUTPATIENT)
Dept: FAMILY MEDICINE | Facility: CLINIC | Age: 25
End: 2020-05-27

## 2020-05-27 DIAGNOSIS — Z53.9 ERRONEOUS ENCOUNTER--DISREGARD: Primary | ICD-10-CM

## 2020-05-27 DIAGNOSIS — F39 MOOD DISORDER (H): Primary | ICD-10-CM

## 2020-05-27 ASSESSMENT — ANXIETY QUESTIONNAIRES
6. BECOMING EASILY ANNOYED OR IRRITABLE: NEARLY EVERY DAY
GAD7 TOTAL SCORE: 15
GAD7 TOTAL SCORE: 15
3. WORRYING TOO MUCH ABOUT DIFFERENT THINGS: MORE THAN HALF THE DAYS
GAD7 TOTAL SCORE: 15
7. FEELING AFRAID AS IF SOMETHING AWFUL MIGHT HAPPEN: MORE THAN HALF THE DAYS
2. NOT BEING ABLE TO STOP OR CONTROL WORRYING: MORE THAN HALF THE DAYS
5. BEING SO RESTLESS THAT IT IS HARD TO SIT STILL: MORE THAN HALF THE DAYS
4. TROUBLE RELAXING: MORE THAN HALF THE DAYS
7. FEELING AFRAID AS IF SOMETHING AWFUL MIGHT HAPPEN: MORE THAN HALF THE DAYS
1. FEELING NERVOUS, ANXIOUS, OR ON EDGE: MORE THAN HALF THE DAYS

## 2020-05-27 ASSESSMENT — PATIENT HEALTH QUESTIONNAIRE - PHQ9
SUM OF ALL RESPONSES TO PHQ QUESTIONS 1-9: 14
SUM OF ALL RESPONSES TO PHQ QUESTIONS 1-9: 14
10. IF YOU CHECKED OFF ANY PROBLEMS, HOW DIFFICULT HAVE THESE PROBLEMS MADE IT FOR YOU TO DO YOUR WORK, TAKE CARE OF THINGS AT HOME, OR GET ALONG WITH OTHER PEOPLE: VERY DIFFICULT

## 2020-05-28 ENCOUNTER — VIRTUAL VISIT (OUTPATIENT)
Dept: FAMILY MEDICINE | Facility: CLINIC | Age: 25
End: 2020-05-28
Payer: COMMERCIAL

## 2020-05-28 DIAGNOSIS — F33.0 MAJOR DEPRESSIVE DISORDER, RECURRENT EPISODE, MILD (H): ICD-10-CM

## 2020-05-28 DIAGNOSIS — R42 LIGHT HEADED: ICD-10-CM

## 2020-05-28 DIAGNOSIS — F41.1 GENERALIZED ANXIETY DISORDER: Primary | ICD-10-CM

## 2020-05-28 PROCEDURE — 99214 OFFICE O/P EST MOD 30 MIN: CPT | Mod: TEL | Performed by: FAMILY MEDICINE

## 2020-05-28 RX ORDER — VENLAFAXINE HYDROCHLORIDE 37.5 MG/1
37.5 TABLET, EXTENDED RELEASE ORAL DAILY
Qty: 60 TABLET | Refills: 1 | Status: SHIPPED | OUTPATIENT
Start: 2020-05-28 | End: 2020-10-13

## 2020-05-28 ASSESSMENT — PATIENT HEALTH QUESTIONNAIRE - PHQ9: SUM OF ALL RESPONSES TO PHQ QUESTIONS 1-9: 14

## 2020-05-28 ASSESSMENT — ANXIETY QUESTIONNAIRES: GAD7 TOTAL SCORE: 15

## 2020-05-28 NOTE — PROGRESS NOTES
"Kena Sorto is a 25 year old female who is being evaluated via a billable telephone visit.      The patient has been notified of following:     \"This telephone visit will be conducted via a call between you and your physician/provider. We have found that certain health care needs can be provided without the need for a physical exam.  This service lets us provide the care you need with a short phone conversation.  If a prescription is necessary we can send it directly to your pharmacy.  If lab work is needed we can place an order for that and you can then stop by our lab to have the test done at a later time.    If during the course of the call the physician/provider feels a telephone visit is not appropriate, you will not be charged for this service.\"       Kena Sorto complains of    Chief Complaint   Patient presents with     Depression     Dizziness       I have reviewed and updated the patient's Past Medical History, Social History, Family History and Medication List.    ALLERGIES  Keflex [cephalexin hcl]; Penicillins; and Prozac [fluoxetine]    Chantal Bland (MA signature)    Additional provider notes:   Depression and Anxiety Follow-Up    How are you doing with your depression since your last visit? No change    How are you doing with your anxiety since your last visit?  Worsened     Are you having other symptoms that might be associated with depression or anxiety? No    Have you had a significant life event? No     Do you have any concerns with your use of alcohol or other drugs? No    Social History     Tobacco Use     Smoking status: Never Smoker     Smokeless tobacco: Never Used     Tobacco comment: no smokers in the household   Substance Use Topics     Alcohol use: No     Drug use: No     PHQ 9/10/2019 2/5/2020 5/27/2020   PHQ-9 Total Score 0 12 14   Q9: Thoughts of better off dead/self-harm past 2 weeks Not at all Not at all Not at all     BA-7 SCORE 5/27/2020   Total Score 15 (severe anxiety)   Total " Score 15     Last PHQ-9 5/27/2020   1.  Little interest or pleasure in doing things 2   2.  Feeling down, depressed, or hopeless 1   3.  Trouble falling or staying asleep, or sleeping too much 2   4.  Feeling tired or having little energy 3   5.  Poor appetite or overeating 2   6.  Feeling bad about yourself 1   7.  Trouble concentrating 2   8.  Moving slowly or restless 1   Q9: Thoughts of better off dead/self-harm past 2 weeks 0   PHQ-9 Total Score 14   Difficulty at work, home, or with people -     BA-7  5/27/2020   1. Feeling nervous, anxious, or on edge 2   2. Not being able to stop or control worrying 2   3. Worrying too much about different things 2   4. Trouble relaxing 2   5. Being so restless that it is hard to sit still 2   6. Becoming easily annoyed or irritable 3   7. Feeling afraid, as if something awful might happen 2   BA-7 Total Score 15       Feeling worse  No major changes  effexor worked well for mom/sis      Assessment/Plan:    ICD-10-CM    1. Generalized anxiety disorder  F41.1 venlafaxine (EFFEXOR-ER) 37.5 MG 24 hr tablet   2. Major depressive disorder, recurrent episode, mild (H)  F33.0 venlafaxine (EFFEXOR-ER) 37.5 MG 24 hr tablet       Uncontrolled. Stop celexa. Start effexor. Recheck in 2-3 wks  I have reviewed the note as documented above.  This accurately captures the substance of my conversation with the patient.    Phone call contact time 10m    Jacob WAGNER

## 2020-05-29 ENCOUNTER — TELEPHONE (OUTPATIENT)
Dept: FAMILY MEDICINE | Facility: CLINIC | Age: 25
End: 2020-05-29

## 2020-05-29 NOTE — TELEPHONE ENCOUNTER
Prior Authorization Retail Medication Request    Medication/Dose: venlafaxine (EFFEXOR-ER) 37.5 MG 24 hr tablet   ICD code (if different than what is on RX):    Previously Tried and Failed:    Rationale:      Insurance Name:  [employer] ([employer])  Insurance ID:  [id number]      Pharmacy Information (if different than what is on RX)  Name:    Phone:

## 2020-05-29 NOTE — TELEPHONE ENCOUNTER
Central Prior Authorization Team   Phone: [phone]      PA Initiation    Medication: venlafaxine (EFFEXOR-ER) 37.5 MG 24 hr tablet   Insurance Company: [employer] - Phone [phone] Fax [phone]  Pharmacy Filling the Rx: [id number] - [place] -  [address] AT [facility] [address]  Filling Pharmacy Phone: [phone]  Filling Pharmacy Fax:    Start Date: 5/29/2020

## 2020-06-02 NOTE — TELEPHONE ENCOUNTER
Prior Authorization Approval    Authorization Effective Date: 4/29/2020  Authorization Expiration Date: 5/30/2023  Medication: venlafaxine (EFFEXOR-ER) 37.5 MG 24 hr tablet   Approved Dose/Quantity:    Reference #: [id number]   Insurance Company: [employer] - Phone [phone] Fax [phone]  Expected CoPay:       CoPay Card Available:      Foundation Assistance Needed:    Which Pharmacy is filling the prescription (Not needed for infusion/clinic administered): [id number] - [place] - [address] AT [facility] [address]  Pharmacy Notified: Yes  Patient Notified: Yes  **Instructed pharmacy to notify patient when script is ready to /ship.**

## 2020-06-25 ENCOUNTER — VIRTUAL VISIT (OUTPATIENT)
Dept: FAMILY MEDICINE | Facility: CLINIC | Age: 25
End: 2020-06-25
Payer: COMMERCIAL

## 2020-06-25 DIAGNOSIS — F33.0 MAJOR DEPRESSIVE DISORDER, RECURRENT EPISODE, MILD (H): Primary | ICD-10-CM

## 2020-06-25 PROCEDURE — 96127 BRIEF EMOTIONAL/BEHAV ASSMT: CPT | Performed by: FAMILY MEDICINE

## 2020-06-25 PROCEDURE — 99213 OFFICE O/P EST LOW 20 MIN: CPT | Mod: TEL | Performed by: FAMILY MEDICINE

## 2020-06-25 ASSESSMENT — PATIENT HEALTH QUESTIONNAIRE - PHQ9: SUM OF ALL RESPONSES TO PHQ QUESTIONS 1-9: 0

## 2020-06-25 NOTE — PROGRESS NOTES
"Kena Sorto is a 25 year old female who is being evaluated via a billable telephone visit.      The patient has been notified of following:     \"This telephone visit will be conducted via a call between you and your physician/provider. We have found that certain health care needs can be provided without the need for a physical exam.  This service lets us provide the care you need with a short phone conversation.  If a prescription is necessary we can send it directly to your pharmacy.  If lab work is needed we can place an order for that and you can then stop by our lab to have the test done at a later time.    If during the course of the call the physician/provider feels a telephone visit is not appropriate, you will not be charged for this service.\"       Kena Sorto complains of    Chief Complaint   Patient presents with     Depression     Anxiety       I have reviewed and updated the patient's Past Medical History, Social History, Family History and Medication List.    ALLERGIES  Keflex [cephalexin hcl]; Penicillins; and Prozac [fluoxetine]    Chantal Bland (MA signature)    Additional provider notes:   Depression Followup    How are you doing with your depression since your last visit? Improved yes    Are you having other symptoms that might be associated with depression? No    Have you had a significant life event?  No     Are you feeling anxious or having panic attacks?   No    Do you have any concerns with your use of alcohol or other drugs? No    Social History     Tobacco Use     Smoking status: Never Smoker     Smokeless tobacco: Never Used     Tobacco comment: no smokers in the household   Substance Use Topics     Alcohol use: No     Drug use: No     PHQ 2/5/2020 5/27/2020 6/25/2020   PHQ-9 Total Score 12 14 0   Q9: Thoughts of better off dead/self-harm past 2 weeks Not at all Not at all Not at all     BA-7 SCORE 5/27/2020   Total Score 15 (severe anxiety)   Total Score 15     Last PHQ-9 6/25/2020   1.  " Little interest or pleasure in doing things 0   2.  Feeling down, depressed, or hopeless 0   3.  Trouble falling or staying asleep, or sleeping too much 0   4.  Feeling tired or having little energy 0   5.  Poor appetite or overeating 0   6.  Feeling bad about yourself 0   7.  Trouble concentrating 0   8.  Moving slowly or restless 0   Q9: Thoughts of better off dead/self-harm past 2 weeks 0   PHQ-9 Total Score 0   Difficulty at work, home, or with people Not difficult at all     BA-7  5/27/2020   1. Feeling nervous, anxious, or on edge 2   2. Not being able to stop or control worrying 2   3. Worrying too much about different things 2   4. Trouble relaxing 2   5. Being so restless that it is hard to sit still 2   6. Becoming easily annoyed or irritable 3   7. Feeling afraid, as if something awful might happen 2   BA-7 Total Score 15     Doing much better  No se's  Discussed not missing doses  Suicide Assessment Five-step Evaluation and Treatment (SAFE-T)      Assessment/Plan:    ICD-10-CM    1. Major depressive disorder, recurrent episode, mild (H)  F33.0        Much improved, tolerating meds  Follow up 6mo  Call sooner if worse    I have reviewed the note as documented above.  This accurately captures the substance of my conversation with the patient.    Phone call contact time 10m    Jacob WAGNER

## 2020-06-30 ENCOUNTER — MYC MEDICAL ADVICE (OUTPATIENT)
Dept: FAMILY MEDICINE | Facility: CLINIC | Age: 25
End: 2020-06-30

## 2020-06-30 NOTE — TELEPHONE ENCOUNTER
Letter completed and spoke to patient it was a gym membership. And she will print it off [employer].  Jaki Ferrari MA 6/30/2020

## 2020-06-30 NOTE — LETTER
[address] [address]  [phone]        June 30, 2020    Regarding:  Kena QUINONES Noreen  [address] [place] [address]              To Whom It May Concern;    She has a medical condition and is justified to stop the membership due to the pandemic.          Sincerely,        Lance Tang MD

## 2020-09-02 ENCOUNTER — E-VISIT (OUTPATIENT)
Dept: FAMILY MEDICINE | Facility: CLINIC | Age: 25
End: 2020-09-02
Payer: COMMERCIAL

## 2020-09-02 DIAGNOSIS — J01.90 ACUTE SINUSITIS WITH SYMPTOMS > 10 DAYS: Primary | ICD-10-CM

## 2020-09-02 PROCEDURE — 99421 OL DIG E/M SVC 5-10 MIN: CPT | Mod: 95 | Performed by: FAMILY MEDICINE

## 2020-09-04 ENCOUNTER — TELEPHONE (OUTPATIENT)
Dept: FAMILY MEDICINE | Facility: CLINIC | Age: 25
End: 2020-09-04

## 2020-09-04 RX ORDER — DOXYCYCLINE HYCLATE 100 MG
100 TABLET ORAL 2 TIMES DAILY
Qty: 20 TABLET | Refills: 0 | Status: SHIPPED | OUTPATIENT
Start: 2020-09-04 | End: 2021-03-09

## 2020-09-04 NOTE — TELEPHONE ENCOUNTER
Prior Authorization Approval    Authorization Effective Date: 8/5/2020  Authorization Expiration Date: 9/4/2021  Medication: mometasone (NASONEX) 50 MCG/ACT nasal spray -APPROVED  Approved Dose/Quantity:   Reference #:     Insurance Company: [employer] - Phone [phone] Fax [phone]  Expected CoPay:       CoPay Card Available:      Foundation Assistance Needed:    Which Pharmacy is filling the prescription (Not needed for infusion/clinic administered): [facility] #[id number] - [place] - [address] AT [facility] [address]  Pharmacy Notified: Yes  Patient Notified: No    Pharmacy will notify patient when medication is ready.

## 2020-09-04 NOTE — TELEPHONE ENCOUNTER
Central Prior Authorization Team   Phone: [phone]      PA Initiation    Medication: mometasone (NASONEX) 50 MCG/ACT nasal spray -Initiated  Insurance Company: [employer] - Phone [phone] Fax [phone]  Pharmacy Filling the Rx: [facility] #[id number] - [place], [place] -  [address] AT [facility] [address] [place] [address]  Filling Pharmacy Phone: [phone]  Filling Pharmacy Fax:    Start Date: 9/4/2020

## 2020-09-04 NOTE — PATIENT INSTRUCTIONS
Thank you for choosing us for your care. I have placed an order for a prescription so that you can start treatment. View your full visit summary for details by clicking on the link below. Your pharmacist will able to address any questions you may have about the medication.     If you're not feeling better within 5-7 days, please schedule an appointment.  You can schedule an appointment right here in [facility] [place], or call [phone]  If the visit is for the same symptoms as your e-visit, we'll refund the cost of your e-visit if seen within seven days.      Sinusitis (Antibiotic Treatment)    The sinuses are air-filled spaces within the bones of the face. They connect to the inside of the nose. Sinusitis is an inflammation of the tissue that lines the sinuses. Sinusitis can occur during a cold. It can also happen due to allergies to pollens and other particles in the air. Sinusitis can cause symptoms of sinus congestion and a feeling of fullness. A sinus infection causes fever, headache, and facial pain. There is often green or yellow fluid draining from the nose or into the back of the throat (post-nasal drip). You have been given antibiotics to treat this condition.  Home care    Take the full course of antibiotics as instructed. Do not stop taking them, even when you feel better.    Drink plenty of water, hot tea, and other liquids. This may help thin nasal mucus. It also may help your sinuses drain fluids.    Heat may help soothe painful areas of your face. Use a towel soaked in hot water. Or,  the shower and direct the warm spray onto your face. Using a vaporizer along with a menthol rub at night may also help soothe symptoms.     An expectorant with guaifenesin may help thin nasal mucus and help your sinuses drain fluids.    You can use an over-the-counter decongestant, unless a similar medicine was prescribed to you. Nasal sprays work the fastest. Use one that contains phenylephrine or oxymetazoline.  First blow your nose gently. Then use the spray. Do not use these medicines more often than directed on the label. If you do, your symptoms may get worse. You may also take pills that contain pseudoephedrine. Don t use products that combine multiple medicines. This is because side effects may be increased. Read labels. You can also ask the pharmacist for help. (People with high blood pressure should not use decongestants. They can raise blood pressure.)    Over-the-counter antihistamines may help if allergies contributed to your sinusitis.      Do not use nasal rinses or irrigation during an acute sinus infection, unless your healthcare provider tells you to. Rinsing may spread the infection to other areas in your sinuses.    Use acetaminophen or ibuprofen to control pain, unless another pain medicine was prescribed to you. If you have chronic liver or kidney disease or ever had a stomach ulcer, talk with your healthcare provider before using these medicines. (Aspirin should never be taken by anyone under age 18 who is ill with a fever. It may cause severe liver damage.)    Don't smoke. This can make symptoms worse.  Follow-up care  Follow up with your healthcare provider or our staff if you are not better in 1 week.  When to seek medical advice  Call your healthcare provider if any of these occur:    Facial pain or headache that gets worse    Stiff neck    Unusual drowsiness or confusion    Swelling of your forehead or eyelids    Vision problems, such as blurred or double vision    Fever of 100.4 F (38 C) or higher, or as directed by your healthcare provider    Seizure    Breathing problems    Symptoms don't go away in 10 days  Prevention  Here are steps you can take to help prevent an infection:    Keep good hand washing habits.    Don t have close contact with people who have sore throats, colds, or other upper respiratory infections.    Don t smoke, and stay away from secondhand smoke.    Stay up to date with of  your vaccines.  Date Last Reviewed: 11/1/2017 2000-2019 [employer]. [address] [place] [address], [place] [address]. All rights reserved. This information is not intended as a substitute for professional medical care. Always follow your healthcare professional's instructions.

## 2020-09-04 NOTE — TELEPHONE ENCOUNTER
Prior Authorization Retail Medication Request    Medication/Dose: mometasone (NASONEX) 50 MCG/ACT nasal spray     Key: [id number]   ICD code (if different than what is on RX):    Previously Tried and Failed:    Rationale:      Insurance Name:  [employer]   Insurance ID:  [id number]      Pharmacy Information (if different than what is on RX)  Name:    Phone:

## 2020-10-02 ENCOUNTER — E-VISIT (OUTPATIENT)
Dept: FAMILY MEDICINE | Facility: CLINIC | Age: 25
End: 2020-10-02
Payer: COMMERCIAL

## 2020-10-02 DIAGNOSIS — Z20.822 EXPOSURE TO COVID-19 VIRUS: ICD-10-CM

## 2020-10-02 DIAGNOSIS — J01.10 ACUTE NON-RECURRENT FRONTAL SINUSITIS: ICD-10-CM

## 2020-10-02 PROCEDURE — 99421 OL DIG E/M SVC 5-10 MIN: CPT | Mod: 95 | Performed by: FAMILY MEDICINE

## 2020-10-02 RX ORDER — AZITHROMYCIN 250 MG/1
TABLET, FILM COATED ORAL
Qty: 6 TABLET | Refills: 0 | Status: SHIPPED | OUTPATIENT
Start: 2020-10-02 | End: 2020-10-07

## 2020-10-02 NOTE — PATIENT INSTRUCTIONS
Thank you for choosing us for your care. I have placed an order for a prescription so that you can start treatment. View your full visit summary for details by clicking on the link below. Your pharmacist will able to address any questions you may have about the medication.     If you're not feeling better within 5-7 days, please schedule an appointment.  You can schedule an appointment right here in [facility], or call [phone]  If the visit is for the same symptoms as your e-visit, we'll refund the cost of your e-visit if seen within seven days.    Thank you for choosing us for your care. Given your symptoms, I would like you to do a lab-only visit to determine what is causing them.  I have placed the orders.  Please schedule an appointment with the lab right here in [facility], or call [phone].  I will let you know when the results are back and next steps to take.

## 2020-10-11 DIAGNOSIS — F33.0 MAJOR DEPRESSIVE DISORDER, RECURRENT EPISODE, MILD (H): ICD-10-CM

## 2020-10-11 DIAGNOSIS — F41.1 GENERALIZED ANXIETY DISORDER: ICD-10-CM

## 2020-10-12 ENCOUNTER — MYC REFILL (OUTPATIENT)
Dept: FAMILY MEDICINE | Facility: CLINIC | Age: 25
End: 2020-10-12

## 2020-10-12 ENCOUNTER — MYC MEDICAL ADVICE (OUTPATIENT)
Dept: FAMILY MEDICINE | Facility: CLINIC | Age: 25
End: 2020-10-12

## 2020-10-12 DIAGNOSIS — J45.30 MILD PERSISTENT ASTHMA WITHOUT COMPLICATION: ICD-10-CM

## 2020-10-12 DIAGNOSIS — J30.2 SEASONAL ALLERGIC RHINITIS, UNSPECIFIED TRIGGER: ICD-10-CM

## 2020-10-12 DIAGNOSIS — F33.0 MAJOR DEPRESSIVE DISORDER, RECURRENT EPISODE, MILD (H): ICD-10-CM

## 2020-10-12 DIAGNOSIS — F41.1 GENERALIZED ANXIETY DISORDER: ICD-10-CM

## 2020-10-12 RX ORDER — VENLAFAXINE HYDROCHLORIDE 37.5 MG/1
37.5 TABLET, EXTENDED RELEASE ORAL DAILY
Qty: 60 TABLET | Refills: 1 | Status: CANCELLED | OUTPATIENT
Start: 2020-10-12

## 2020-10-12 RX ORDER — MOMETASONE FUROATE MONOHYDRATE 50 UG/1
2 SPRAY, METERED NASAL DAILY
Qty: 3 BOX | Refills: 0 | Status: SHIPPED | OUTPATIENT
Start: 2020-10-12 | End: 2021-10-06

## 2020-10-12 RX ORDER — CETIRIZINE HYDROCHLORIDE 10 MG/1
10 TABLET ORAL DAILY
Qty: 90 TABLET | Refills: 0 | Status: SHIPPED | OUTPATIENT
Start: 2020-10-12

## 2020-10-12 NOTE — TELEPHONE ENCOUNTER
"Requested Prescriptions   Pending Prescriptions Disp Refills     budesonide-formoterol (SYMBICORT) 160-4.5 MCG/ACT Inhaler 3 Inhaler 3     Sig: Inhale 2 puffs into the lungs 2 times daily       Inhaled Steroids Protocol Failed - 10/12/2020  3:38 PM        Failed - Asthma control assessment score within normal limits in last 6 months     Please review ACT score.   ACT Total Scores 2/19/2018 7/26/2018 9/10/2019   ACT TOTAL SCORE - - -   ASTHMA ER VISITS - - -   ASTHMA HOSPITALIZATIONS - - -   ACT TOTAL SCORE (Goal Greater than or Equal to 20) 23 24 25   In the past 12 months, how many times did you visit the emergency room for your asthma without being admitted to the hospital? 0 0 0   In the past 12 months, how many times were you hospitalized overnight because of your asthma? 0 0 0           Passed - Patient is age 12 or older        Passed - Medication is active on med list        Passed - Recent (6 mo) or future (30 days) visit within the authorizing provider's specialty     Patient had office visit in the last 6 months or has a visit in the next 30 days with authorizing provider or within the authorizing provider's specialty.  See \"Patient Info\" tab in [facility], or \"Choose Columns\" in Meds & Orders section of the refill encounter.           Long-Acting Beta Agonist Inhalers Protocol  Failed - 10/12/2020  3:38 PM        Failed - Asthma control assessment score within normal limits in last 6 months     Please review ACT score.           Failed - Order for Serevent, Striverdi, or Foradil and pt has steroid inhaler        Passed - Patient is age 12 or older        Passed - Medication is active on med list        Passed - Recent (6 mo) or future (30 days) visit within the authorizing provider's specialty     Patient had office visit in the last 6 months or has a visit in the next 30 days with authorizing provider or within the authorizing provider's specialty.  See \"Patient Info\" tab in inbasket, or \"Choose Columns\" " "in Meds & Orders section of the refill encounter.          Routing refill request to provider for review/approval          cetirizine (ZYRTEC ALLERGY) 10 MG tablet 90 tablet 3     Sig: Take 1 tablet (10 mg) by mouth daily       Antihistamines Protocol Passed - 10/12/2020  3:38 PM        Passed - Patient is 3-64 years of age     Apply weight-based dosing for peds patients age 3 - 12 years of age.    Forward request to provider for patients under the age of 3 or over the age of 64.          Passed - Recent (12 mo) or future (30 days) visit within the authorizing provider's specialty     Patient has had an office visit with the authorizing provider or a provider within the authorizing providers department within the previous 12 mos or has a future within next 30 days. See \"Patient Info\" tab in inbasket, or \"Choose Columns\" in Meds & Orders section of the refill encounter.              Passed - Medication is active on med list      Prescription approved per [facility].       mometasone (NASONEX) 50 MCG/ACT nasal spray 3 Box 3     Sig: Spray 2 sprays into both nostrils daily       Nasal Allergy Protocol Passed - 10/12/2020  3:38 PM        Passed - Patient is age 12 or older        Passed - Recent (12 mo) or future (30 days) visit within the authorizing provider's specialty     Patient has had an office visit with the authorizing provider or a provider within the authorizing providers department within the previous 12 mos or has a future within next 30 days. See \"Patient Info\" tab in inbasket, or \"Choose Columns\" in Meds & Orders section of the refill encounter.              Passed - Medication is active on med list         Prescription approved per [facility].  Lorie Blakely RN      "

## 2020-10-12 NOTE — TELEPHONE ENCOUNTER
budesonide-formoterol (SYMBICORT) 160-4.5 MCG/ACT Inhaler 3 Inhaler 3    Sig: Inhale 2 puffs into the lungs 2 times daily   Routing refill request to provider for review/approval because:  ACT not current  ACT does not pass protocol  ACT Total Scores 2/19/2018 7/26/2018 9/10/2019   ACT TOTAL SCORE - - -   ASTHMA ER VISITS - - -   ASTHMA HOSPITALIZATIONS - - -   ACT TOTAL SCORE (Goal Greater than or Equal to 20) 23 24 25   In the past 12 months, how many times did you visit the emergency room for your asthma without being admitted to the hospital? 0 0 0   In the past 12 months, how many times were you hospitalized overnight because of your asthma? 0 0 0   T'd up 3 inhalers for provider review.    Lorie Blakely RN

## 2020-10-12 NOTE — TELEPHONE ENCOUNTER
Can you approve this Dr. Rosa in Dr. Tang's absence.  She is not doing good as she has been out of this for a few days.

## 2020-10-12 NOTE — TELEPHONE ENCOUNTER
Routing refill request to provider for review/approval because:  Labs not current:  Creatinine    Sharlene Askew RN

## 2020-10-13 ENCOUNTER — E-VISIT (OUTPATIENT)
Dept: FAMILY MEDICINE | Facility: CLINIC | Age: 25
End: 2020-10-13
Payer: COMMERCIAL

## 2020-10-13 DIAGNOSIS — B96.89 BV (BACTERIAL VAGINOSIS): Primary | ICD-10-CM

## 2020-10-13 DIAGNOSIS — N76.0 BV (BACTERIAL VAGINOSIS): Primary | ICD-10-CM

## 2020-10-13 PROCEDURE — 99421 OL DIG E/M SVC 5-10 MIN: CPT | Performed by: FAMILY MEDICINE

## 2020-10-13 RX ORDER — VENLAFAXINE HYDROCHLORIDE 37.5 MG/1
37.5 TABLET, EXTENDED RELEASE ORAL DAILY
Qty: 90 TABLET | Refills: 0 | Status: SHIPPED | OUTPATIENT
Start: 2020-10-13 | End: 2021-01-18

## 2020-10-14 RX ORDER — METRONIDAZOLE 500 MG/1
500 TABLET ORAL 2 TIMES DAILY
Qty: 14 TABLET | Refills: 0 | Status: SHIPPED | OUTPATIENT
Start: 2020-10-14 | End: 2020-10-21

## 2020-10-14 RX ORDER — BUDESONIDE AND FORMOTEROL FUMARATE DIHYDRATE 160; 4.5 UG/1; UG/1
2 AEROSOL RESPIRATORY (INHALATION) 2 TIMES DAILY
Qty: 3 INHALER | Refills: 0 | Status: SHIPPED | OUTPATIENT
Start: 2020-10-14 | End: 2021-01-12

## 2020-10-14 NOTE — PATIENT INSTRUCTIONS
Thank you for choosing us for your care. I have placed an order for a prescription so that you can start treatment. View your full visit summary for details by clicking on the link below. Your pharmacist will able to address any questions you may have about the medication.     If you re not feeling better within 2-3 days, please schedule an appointment.  You can schedule an appointment right here in [facility], or call [phone]  If the visit is for the same symptoms as your e-visit, we ll refund the cost of your e-visit if seen within seven days.      Bacterial Vaginosis    You have a vaginal infection called bacterial vaginosis (BV). Both good and bad bacteria are present in a healthy vagina. BV occurs when these bacteria get out of balance. The number of bad bacteria increase. And the number of good bacteria decrease. Although BV is associated with sexual activity, it is not a sexually transmitted disease.  BV may or may not cause symptoms. If symptoms do occur, they can include:    Thin, gray, milky-white, or sometimes green discharge    Unpleasant odor or  fishy  smell    Itching, burning, or pain in or around the vagina  It is not known what causes BV, but certain factors can make the problem more likely. This can include:    Douching    Having sex with a new partner    Having sex with more than one partner  BV will sometimes go away on its own. But treatment is usually recommended. This is because untreated BV can increase the risk of more serious health problems such as:    Pelvic inflammatory disease (PID)     delivery (giving birth to a baby early if you re pregnant)    HIV and certain other sexually transmitted diseases (STDs)    Infection after surgery on the reproductive organs  Home care  General care    BV is most often treated with medicines called antibiotics. These may be given as pills or as a vaginal cream. If antibiotics are prescribed, be sure to use them exactly as directed. Also, be  sure to complete all of the medicine, even if your symptoms go away.    Don't douche or having sex during treatment.    If you have sex with a female partner, ask your healthcare provider if she should also be treated.  Prevention    Don't douche.    Don't have sex. If you do have sex, then take steps to lower your risk:  ? Use condoms when having sex.  ? Limit the number of sexual partners you have.  Follow-up care  Follow up with your healthcare provider, or as advised.  When to seek medical advice  Call your healthcare provider right away if:    You have a fever of 100.4 F (38 C) or higher, or as directed by your provider.    Your symptoms worsen, or they don t go away within a few days of starting treatment.    You have new pain in the lower belly or pelvic region.    You have side effects that bother you or a reaction to the pills or cream you re prescribed.    You or any partners you have sex with have new symptoms, such as a rash, joint pain, or sores.  Date Last Reviewed: 10/1/2017    5517-8445 [employer]. [address] [place] [address], [place] [address]. All rights reserved. This information is not intended as a substitute for professional medical care. Always follow your healthcare professional's instructions.

## 2020-10-29 ENCOUNTER — MYC MEDICAL ADVICE (OUTPATIENT)
Dept: FAMILY MEDICINE | Facility: CLINIC | Age: 25
End: 2020-10-29

## 2021-01-11 DIAGNOSIS — J45.30 MILD PERSISTENT ASTHMA WITHOUT COMPLICATION: ICD-10-CM

## 2021-01-11 NOTE — TELEPHONE ENCOUNTER
Routing refill request to provider for review/approval because:  ACT not current    Sharlene Askew RN

## 2021-01-12 RX ORDER — BUDESONIDE AND FORMOTEROL FUMARATE DIHYDRATE 160; 4.5 UG/1; UG/1
AEROSOL RESPIRATORY (INHALATION)
Qty: 30.6 G | Refills: 3 | Status: SHIPPED | OUTPATIENT
Start: 2021-01-12 | End: 2021-10-06

## 2021-01-15 DIAGNOSIS — F33.0 MAJOR DEPRESSIVE DISORDER, RECURRENT EPISODE, MILD (H): ICD-10-CM

## 2021-01-15 DIAGNOSIS — F41.1 GENERALIZED ANXIETY DISORDER: ICD-10-CM

## 2021-01-16 ENCOUNTER — E-VISIT (OUTPATIENT)
Dept: OBGYN | Facility: CLINIC | Age: 26
End: 2021-01-16
Payer: COMMERCIAL

## 2021-01-16 DIAGNOSIS — N39.0 ACUTE UTI (URINARY TRACT INFECTION): Primary | ICD-10-CM

## 2021-01-16 PROCEDURE — 99421 OL DIG E/M SVC 5-10 MIN: CPT | Performed by: FAMILY MEDICINE

## 2021-01-18 ENCOUNTER — MYC REFILL (OUTPATIENT)
Dept: FAMILY MEDICINE | Facility: CLINIC | Age: 26
End: 2021-01-18

## 2021-01-18 DIAGNOSIS — F33.0 MAJOR DEPRESSIVE DISORDER, RECURRENT EPISODE, MILD (H): ICD-10-CM

## 2021-01-18 DIAGNOSIS — F41.1 GENERALIZED ANXIETY DISORDER: ICD-10-CM

## 2021-01-18 RX ORDER — VENLAFAXINE HYDROCHLORIDE 37.5 MG/1
TABLET, EXTENDED RELEASE ORAL
Qty: 90 TABLET | Refills: 0 | Status: SHIPPED | OUTPATIENT
Start: 2021-01-18 | End: 2021-03-09

## 2021-01-18 RX ORDER — NITROFURANTOIN 25; 75 MG/1; MG/1
100 CAPSULE ORAL 2 TIMES DAILY
Qty: 10 CAPSULE | Refills: 0 | Status: SHIPPED | OUTPATIENT
Start: 2021-01-18 | End: 2021-01-23

## 2021-01-18 RX ORDER — VENLAFAXINE HYDROCHLORIDE 37.5 MG/1
37.5 TABLET, EXTENDED RELEASE ORAL DAILY
Qty: 90 TABLET | Refills: 0 | OUTPATIENT
Start: 2021-01-18

## 2021-01-18 RX ORDER — VENLAFAXINE HYDROCHLORIDE 37.5 MG/1
37.5 TABLET, EXTENDED RELEASE ORAL DAILY
Qty: 90 TABLET | Refills: 0 | Status: CANCELLED | OUTPATIENT
Start: 2021-01-18

## 2021-01-18 NOTE — TELEPHONE ENCOUNTER
Routing refill request to provider for review/approval because:  Labs not current:  Creatinine  PHQ 9 not current  Needs depression follow up visit    Sharlene Askew RN

## 2021-01-18 NOTE — PATIENT INSTRUCTIONS
Dear Kena Sorto    After reviewing your responses, I've been able to diagnose you with a urinary tract infection, which is a common infection of the bladder with bacteria.  This is not a sexually transmitted infection, though urinating immediately after intercourse can help prevent infections.  Drinking lots of fluids is also helpful to clear your current infection and prevent the next one.      I have sent a prescription for antibiotics to your pharmacy to treat this infection.    It is important that you take all of your prescribed medication even if your symptoms are improving after a few doses.  Taking all of your medicine helps prevent the symptoms from returning.     If your symptoms worsen, you develop pain in your back or stomach, develop fevers, or are not improving in 5 days, please contact your primary care provider for an appointment or visit any of our convenient Walk-in or Urgent Care Centers to be seen, which can be found on our website here.    Thanks again for choosing us as your health care partner,    Lance Tang MD

## 2021-01-24 ENCOUNTER — E-VISIT (OUTPATIENT)
Dept: FAMILY MEDICINE | Facility: CLINIC | Age: 26
End: 2021-01-24
Payer: COMMERCIAL

## 2021-01-24 DIAGNOSIS — N89.8 VAGINAL DISCHARGE: Primary | ICD-10-CM

## 2021-01-24 PROCEDURE — 99421 OL DIG E/M SVC 5-10 MIN: CPT

## 2021-01-30 ENCOUNTER — APPOINTMENT (OUTPATIENT)
Dept: LAB | Facility: CLINIC | Age: 26
End: 2021-01-30
Payer: COMMERCIAL

## 2021-03-09 ENCOUNTER — OFFICE VISIT (OUTPATIENT)
Dept: FAMILY MEDICINE | Facility: CLINIC | Age: 26
End: 2021-03-09
Payer: COMMERCIAL

## 2021-03-09 VITALS
DIASTOLIC BLOOD PRESSURE: 82 MMHG | HEART RATE: 120 BPM | RESPIRATION RATE: 18 BRPM | HEIGHT: 64 IN | OXYGEN SATURATION: 95 % | TEMPERATURE: 96.5 F | BODY MASS INDEX: 44.56 KG/M2 | SYSTOLIC BLOOD PRESSURE: 122 MMHG | WEIGHT: 261 LBS

## 2021-03-09 DIAGNOSIS — Z83.3 FH: DIABETES MELLITUS: ICD-10-CM

## 2021-03-09 DIAGNOSIS — J45.30 MILD PERSISTENT ASTHMA WITHOUT COMPLICATION: ICD-10-CM

## 2021-03-09 DIAGNOSIS — F41.1 GENERALIZED ANXIETY DISORDER: ICD-10-CM

## 2021-03-09 DIAGNOSIS — Z00.00 ROUTINE GENERAL MEDICAL EXAMINATION AT A HEALTH CARE FACILITY: Primary | ICD-10-CM

## 2021-03-09 DIAGNOSIS — F33.0 MAJOR DEPRESSIVE DISORDER, RECURRENT EPISODE, MILD (H): ICD-10-CM

## 2021-03-09 LAB
CHOLEST SERPL-MCNC: 207 MG/DL
HBA1C MFR BLD: 5 % (ref 0–5.6)
HDLC SERPL-MCNC: 31 MG/DL
LDLC SERPL CALC-MCNC: 100 MG/DL
NONHDLC SERPL-MCNC: 176 MG/DL
TRIGL SERPL-MCNC: 378 MG/DL

## 2021-03-09 PROCEDURE — 83036 HEMOGLOBIN GLYCOSYLATED A1C: CPT | Performed by: FAMILY MEDICINE

## 2021-03-09 PROCEDURE — 36415 COLL VENOUS BLD VENIPUNCTURE: CPT | Performed by: FAMILY MEDICINE

## 2021-03-09 PROCEDURE — 99395 PREV VISIT EST AGE 18-39: CPT | Performed by: FAMILY MEDICINE

## 2021-03-09 PROCEDURE — 80061 LIPID PANEL: CPT | Performed by: FAMILY MEDICINE

## 2021-03-09 RX ORDER — VENLAFAXINE HYDROCHLORIDE 37.5 MG/1
TABLET, EXTENDED RELEASE ORAL
Qty: 90 TABLET | Refills: 1 | Status: SHIPPED | OUTPATIENT
Start: 2021-03-09 | End: 2021-03-09

## 2021-03-09 RX ORDER — VENLAFAXINE HYDROCHLORIDE 37.5 MG/1
75 TABLET, EXTENDED RELEASE ORAL DAILY
Qty: 90 TABLET | Refills: 1 | Status: SHIPPED | OUTPATIENT
Start: 2021-03-09 | End: 2021-04-09

## 2021-03-09 ASSESSMENT — ENCOUNTER SYMPTOMS
HEMATURIA: 0
WEAKNESS: 0
DIARRHEA: 0
PALPITATIONS: 0
FEVER: 0
CHILLS: 0
FREQUENCY: 0
NAUSEA: 0
EYE PAIN: 0
DYSURIA: 0
SHORTNESS OF BREATH: 0
MYALGIAS: 0
HEARTBURN: 0
JOINT SWELLING: 0
SORE THROAT: 0
HEADACHES: 1
ARTHRALGIAS: 0
ABDOMINAL PAIN: 0
DIZZINESS: 0
CONSTIPATION: 0
COUGH: 0
HEMATOCHEZIA: 0
NERVOUS/ANXIOUS: 0
PARESTHESIAS: 0

## 2021-03-09 ASSESSMENT — PATIENT HEALTH QUESTIONNAIRE - PHQ9: SUM OF ALL RESPONSES TO PHQ QUESTIONS 1-9: 4

## 2021-03-09 ASSESSMENT — PAIN SCALES - GENERAL: PAINLEVEL: NO PAIN (0)

## 2021-03-09 ASSESSMENT — MIFFLIN-ST. JEOR: SCORE: 1930.24

## 2021-03-09 NOTE — PROGRESS NOTES
SUBJECTIVE:   CC: Kena Sorto is an 25 year old woman who presents for preventive health visit.       Patient has been advised of split billing requirements and indicates understanding: Yes  Healthy Habits:     Getting at least 3 servings of Calcium per day:  Yes    Bi-annual eye exam:  Yes    Dental care twice a year:  Yes    Sleep apnea or symptoms of sleep apnea:  Daytime drowsiness    Diet:  Regular (no restrictions)    Frequency of exercise:  2-3 days/week    Duration of exercise:  15-30 minutes    Taking medications regularly:  Yes    Medication side effects:  None    PHQ-2 Total Score: 2    Additional concerns today:  Yes              Today's PHQ-2 Score:   PHQ-2 ( 1999 Pfizer) 3/9/2021   Q1: Little interest or pleasure in doing things 1   Q2: Feeling down, depressed or hopeless 0   PHQ-2 Score 1   Q1: Little interest or pleasure in doing things Several days   Q2: Feeling down, depressed or hopeless Several days   PHQ-2 Score 2     PHQ-9 SCORE 5/27/2020 6/25/2020 3/9/2021   PHQ-9 Total Score - - -   PHQ-9 Total Score MyChart 14 (Moderate depression) - -   PHQ-9 Total Score 14 0 4       BA-7 SCORE 5/27/2020   Total Score 15 (severe anxiety)   Total Score 15                  Abuse: Current or Past (Physical, Sexual or Emotional) - No  Do you feel safe in your environment? Yes    Have you ever done Advance Care Planning? (For example, a Health Directive, POLST, or a discussion with a medical provider or your loved ones about your wishes): No, advance care planning information given to patient to review.  Patient plans to discuss their wishes with loved ones or provider.      Social History     Tobacco Use     Smoking status: Never Smoker     Smokeless tobacco: Never Used     Tobacco comment: no smokers in the household   Substance Use Topics     Alcohol use: No     If you drink alcohol do you typically have >3 drinks per day or >7 drinks per week? No    Alcohol Use 3/9/2021   Prescreen: >3 drinks/day or >7  "drinks/week? No   Prescreen: >3 drinks/day or >7 drinks/week? -         Reviewed orders with patient.  Reviewed health maintenance and updated orders accordingly -           History of abnormal Pap smear:   PAP / HPV 2/5/2020   PAP NIL     Reviewed and updated as needed this visit by clinical staff  Tobacco  Allergies  Meds              Reviewed and updated as needed this visit by Provider                    Review of Systems   Constitutional: Negative for chills and fever.   HENT: Positive for congestion. Negative for ear pain, hearing loss and sore throat.    Eyes: Positive for visual disturbance. Negative for pain.   Respiratory: Negative for cough and shortness of breath.    Cardiovascular: Negative for chest pain, palpitations and peripheral edema.   Gastrointestinal: Negative for abdominal pain, constipation, diarrhea, heartburn, hematochezia and nausea.   Genitourinary: Negative for dysuria, frequency, genital sores, hematuria and urgency.   Musculoskeletal: Negative for arthralgias, joint swelling and myalgias.   Skin: Negative for rash.   Neurological: Positive for headaches. Negative for dizziness, weakness and paresthesias.   Psychiatric/Behavioral: Negative for mood changes. The patient is not nervous/anxious.           OBJECTIVE:   /82   Pulse 120   Temp 96.5  F (35.8  C) (Temporal)   Resp 18   Ht 1.636 m (5' 4.4\")   Wt 118.4 kg (261 lb)   LMP 02/13/2021 (Exact Date)   SpO2 95%   BMI 44.25 kg/m    Physical Exam          ASSESSMENT/PLAN:       ICD-10-CM    1. Routine general medical examination at a health care facility  Z00.00 Hemoglobin A1c     Lipid panel reflex to direct LDL Fasting   2. Mild persistent asthma without complication  J45.30 Asthma Action Plan (AAP)   3. Generalized anxiety disorder  F41.1 venlafaxine (EFFEXOR-ER) 37.5 MG 24 hr tablet     DISCONTINUED: venlafaxine (EFFEXOR-ER) 37.5 MG 24 hr tablet   4. Major depressive disorder, recurrent episode, mild (H)  F33.0 " "venlafaxine (EFFEXOR-ER) 37.5 MG 24 hr tablet     DISCONTINUED: venlafaxine (EFFEXOR-ER) 37.5 MG 24 hr tablet   5. FH: diabetes mellitus  Z83.3 Hemoglobin A1c     Comes in for routine annual, routine topics discussed  Family history reviewed and significant for diabetes.  Check an A1c today  Also due for routine cholesterol panel which been not been done to date  Asthma is currently controlled  Anxiety disorder he is uncontrolled, she may increase to 75 mg      Patient has been advised of split billing requirements and indicates understanding: Yes  COUNSELING:  Reviewed preventive health counseling, as reflected in patient instructions    Estimated body mass index is 44.25 kg/m  as calculated from the following:    Height as of this encounter: 1.636 m (5' 4.4\").    Weight as of this encounter: 118.4 kg (261 lb).        She reports that she has never smoked. She has never used smokeless tobacco.      Counseling Resources:  ATP IV Guidelines  Pooled Cohorts Equation Calculator  Breast Cancer Risk Calculator  BRCA-Related Cancer Risk Assessment: FHS-7 Tool  FRAX Risk Assessment  ICSI Preventive Guidelines  Dietary Guidelines for Americans, 2010  USDA's MyPlate  ASA Prophylaxis  Lung CA Screening    Lance Tang MD  [facility]  "

## 2021-03-10 ASSESSMENT — ASTHMA QUESTIONNAIRES: ACT_TOTALSCORE: 20

## 2021-03-13 ENCOUNTER — HEALTH MAINTENANCE LETTER (OUTPATIENT)
Age: 26
End: 2021-03-13

## 2021-03-15 ENCOUNTER — MYC MEDICAL ADVICE (OUTPATIENT)
Dept: FAMILY MEDICINE | Facility: CLINIC | Age: 26
End: 2021-03-15

## 2021-04-08 ENCOUNTER — MYC MEDICAL ADVICE (OUTPATIENT)
Dept: FAMILY MEDICINE | Facility: CLINIC | Age: 26
End: 2021-04-08

## 2021-04-08 DIAGNOSIS — F41.1 GENERALIZED ANXIETY DISORDER: ICD-10-CM

## 2021-04-08 DIAGNOSIS — F33.0 MAJOR DEPRESSIVE DISORDER, RECURRENT EPISODE, MILD (H): ICD-10-CM

## 2021-04-09 RX ORDER — VENLAFAXINE HYDROCHLORIDE 37.5 MG/1
37.5 TABLET, EXTENDED RELEASE ORAL DAILY
Qty: 90 TABLET | Refills: 1 | Status: SHIPPED | OUTPATIENT
Start: 2021-04-09 | End: 2021-10-06

## 2021-07-22 ENCOUNTER — PRENATAL OFFICE VISIT (OUTPATIENT)
Dept: FAMILY MEDICINE | Facility: CLINIC | Age: 26
End: 2021-07-22
Payer: COMMERCIAL

## 2021-07-22 DIAGNOSIS — Z34.90 SUPERVISION OF NORMAL PREGNANCY: Primary | ICD-10-CM

## 2021-07-22 PROCEDURE — 99207 PR NO CHARGE NURSE ONLY: CPT

## 2021-07-22 RX ORDER — PRENATAL VIT/IRON FUM/FOLIC AC 27MG-0.8MG
1 TABLET ORAL DAILY
COMMUNITY
End: 2024-02-07

## 2021-07-23 ENCOUNTER — MYC MEDICAL ADVICE (OUTPATIENT)
Dept: FAMILY MEDICINE | Facility: CLINIC | Age: 26
End: 2021-07-23

## 2021-08-02 ENCOUNTER — MYC MEDICAL ADVICE (OUTPATIENT)
Dept: FAMILY MEDICINE | Facility: CLINIC | Age: 26
End: 2021-08-02

## 2021-08-03 ENCOUNTER — NURSE TRIAGE (OUTPATIENT)
Dept: FAMILY MEDICINE | Facility: CLINIC | Age: 26
End: 2021-08-03

## 2021-08-03 ENCOUNTER — MYC MEDICAL ADVICE (OUTPATIENT)
Dept: FAMILY MEDICINE | Facility: CLINIC | Age: 26
End: 2021-08-03

## 2021-08-03 NOTE — TELEPHONE ENCOUNTER
"Nurse advised patient to go to ED to be assessed for ectopic pregnancy.    Patient verbalized understanding and agreement with plan and had no questions.         Reason for Disposition    MILD constant abdominal pain (e.g., doesn't interfere with normal activities) and present > 2 hours    Additional Information    Negative: Passed out (i.e., fainted, collapsed and was not responding)    Negative: Shock suspected (e.g., cold/pale/clammy skin, too weak to stand, low BP, rapid pulse)    Negative: Difficult to awaken or acting confused (e.g., disoriented, slurred speech)    Negative: Sounds like a life-threatening emergency to the triager    Negative: Abdominal pain and pregnant > 20 weeks    Negative: MODERATE-SEVERE abdominal pain    Negative: SEVERE vaginal bleeding (e.g., soaking 2 pads per hour, large blood clots) and present 2 or more hours    Negative: MODERATE vaginal bleeding (e.g., soaking 1 pad per hour; clots) and present > 6 hours    Negative: MODERATE vaginal bleeding and pregnant > 12 weeks    Negative: Passed tissue (e.g., gray-white)    Negative: Vomiting and contains red blood or black ('coffee ground') material    Negative: Shoulder pain    Answer Assessment - Initial Assessment Questions  1. LOCATION: \"Where does it hurt?\"       Feels like just left side towards hip and sometimes both  2. RADIATION: \"Does the pain shoot anywhere else?\" (e.g., chest, back, shoulder)      No radiation   3. ONSET: \"When did the pain begin?\" (e.g., minutes, hours or days ago)       Started yesterday  4. ONSET: \"Gradual or sudden onset?\"      Gradual and if moves to fast pain hit worse  5. PATTERN \"Does the pain come and go, or has it been constant since it started?\"       Comes and goes, still leaves a dull ache  6. SEVERITY: \"How bad is the pain?\" \"What does it keep you from doing?\"  (e.g., Scale 1-10; mild, moderate, or severe)    - MILD (1-3): doesn't interfere with normal activities, abdomen soft and not tender to " "touch     - MODERATE (4-7): interferes with normal activities or awakens from sleep, tender to touch     - SEVERE (8-10): excruciating pain, doubled over, unable to do any normal activities      3/10  7. RECURRENT SYMPTOM: \"Have you ever had this type of abdominal pain before?\" If so, ask: \"When was the last time?\" and \"What happened that time?\"       No, never  8. CAUSE: \"What do you think is causing the abdominal pain?\"      Pregnancy   9. RELIEVING/AGGRAVATING FACTORS: \"What makes it better or worse?\" (e.g., antacids, bowel movement, movement)      BM usually makes it go away for a time  10. OTHER SYMPTOMS: \"Has there been any vaginal bleeding, fever, vomiting, diarrhea, or urine problems?\"        none  11. EZRA: \"What date are you expecting to deliver?\"        April 2, 2022    Protocols used: PREGNANCY - ABDOMINAL PAIN LESS THAN 20 WEEKS EGA-A-OH      "

## 2021-08-09 ENCOUNTER — MYC MEDICAL ADVICE (OUTPATIENT)
Dept: FAMILY MEDICINE | Facility: CLINIC | Age: 26
End: 2021-08-09

## 2021-08-09 DIAGNOSIS — O21.9 NAUSEA AND VOMITING IN PREGNANCY: Primary | ICD-10-CM

## 2021-08-11 RX ORDER — ONDANSETRON 4 MG/1
4-8 TABLET, ORALLY DISINTEGRATING ORAL EVERY 8 HOURS PRN
Qty: 30 TABLET | Refills: 3 | Status: ON HOLD | OUTPATIENT
Start: 2021-08-11 | End: 2022-03-24

## 2021-09-01 ENCOUNTER — HOSPITAL ENCOUNTER (OUTPATIENT)
Dept: ULTRASOUND IMAGING | Facility: CLINIC | Age: 26
Discharge: HOME | End: 2021-09-01
Attending: FAMILY MEDICINE | Admitting: FAMILY MEDICINE
Payer: COMMERCIAL

## 2021-09-01 ENCOUNTER — LAB (OUTPATIENT)
Dept: LAB | Facility: CLINIC | Age: 26
End: 2021-09-01
Payer: COMMERCIAL

## 2021-09-01 ENCOUNTER — PRENATAL OFFICE VISIT (OUTPATIENT)
Dept: FAMILY MEDICINE | Facility: CLINIC | Age: 26
End: 2021-09-01
Payer: COMMERCIAL

## 2021-09-01 VITALS
RESPIRATION RATE: 16 BRPM | TEMPERATURE: 98.1 F | SYSTOLIC BLOOD PRESSURE: 122 MMHG | OXYGEN SATURATION: 98 % | HEART RATE: 110 BPM | BODY MASS INDEX: 43.09 KG/M2 | WEIGHT: 254.2 LBS | DIASTOLIC BLOOD PRESSURE: 82 MMHG

## 2021-09-01 DIAGNOSIS — N76.0 BV (BACTERIAL VAGINOSIS): ICD-10-CM

## 2021-09-01 DIAGNOSIS — Z34.81 ENCOUNTER FOR SUPERVISION OF OTHER NORMAL PREGNANCY IN FIRST TRIMESTER: Primary | ICD-10-CM

## 2021-09-01 DIAGNOSIS — Z34.90 SUPERVISION OF NORMAL PREGNANCY: ICD-10-CM

## 2021-09-01 DIAGNOSIS — B96.89 BV (BACTERIAL VAGINOSIS): ICD-10-CM

## 2021-09-01 DIAGNOSIS — O21.9 NAUSEA AND VOMITING IN PREGNANCY: ICD-10-CM

## 2021-09-01 LAB
ABO/RH(D): NORMAL
ANTIBODY SCREEN: NEGATIVE
CLUE CELLS: PRESENT
ERYTHROCYTE [DISTWIDTH] IN BLOOD BY AUTOMATED COUNT: 13.1 % (ref 10–15)
HBA1C MFR BLD: 5.4 % (ref 0–5.6)
HCT VFR BLD AUTO: 38 % (ref 35–47)
HGB BLD-MCNC: 13.3 G/DL (ref 11.7–15.7)
MCH RBC QN AUTO: 28.2 PG (ref 26.5–33)
MCHC RBC AUTO-ENTMCNC: 35 G/DL (ref 31.5–36.5)
MCV RBC AUTO: 81 FL (ref 78–100)
PLATELET # BLD AUTO: 230 10E3/UL (ref 150–450)
RBC # BLD AUTO: 4.72 10E6/UL (ref 3.8–5.2)
SPECIMEN EXPIRATION DATE: NORMAL
TRICHOMONAS, WET PREP: ABNORMAL
WBC # BLD AUTO: 8.9 10E3/UL (ref 4–11)
WBC'S/HIGH POWER FIELD, WET PREP: ABNORMAL
YEAST, WET PREP: ABNORMAL

## 2021-09-01 PROCEDURE — 86850 RBC ANTIBODY SCREEN: CPT

## 2021-09-01 PROCEDURE — 87591 N.GONORRHOEAE DNA AMP PROB: CPT | Performed by: FAMILY MEDICINE

## 2021-09-01 PROCEDURE — 87210 SMEAR WET MOUNT SALINE/INK: CPT | Performed by: FAMILY MEDICINE

## 2021-09-01 PROCEDURE — 83036 HEMOGLOBIN GLYCOSYLATED A1C: CPT

## 2021-09-01 PROCEDURE — 87086 URINE CULTURE/COLONY COUNT: CPT

## 2021-09-01 PROCEDURE — 87340 HEPATITIS B SURFACE AG IA: CPT

## 2021-09-01 PROCEDURE — 99207 PR FIRST OB VISIT: CPT | Performed by: FAMILY MEDICINE

## 2021-09-01 PROCEDURE — 85027 COMPLETE CBC AUTOMATED: CPT

## 2021-09-01 PROCEDURE — 87491 CHLMYD TRACH DNA AMP PROBE: CPT | Performed by: FAMILY MEDICINE

## 2021-09-01 PROCEDURE — 86762 RUBELLA ANTIBODY: CPT

## 2021-09-01 PROCEDURE — 87389 HIV-1 AG W/HIV-1&-2 AB AG IA: CPT

## 2021-09-01 PROCEDURE — 86900 BLOOD TYPING SEROLOGIC ABO: CPT

## 2021-09-01 PROCEDURE — 76801 OB US < 14 WKS SINGLE FETUS: CPT

## 2021-09-01 PROCEDURE — 86780 TREPONEMA PALLIDUM: CPT

## 2021-09-01 PROCEDURE — 36415 COLL VENOUS BLD VENIPUNCTURE: CPT

## 2021-09-01 PROCEDURE — 86901 BLOOD TYPING SEROLOGIC RH(D): CPT

## 2021-09-01 RX ORDER — ONDANSETRON 4 MG/1
4 TABLET, ORALLY DISINTEGRATING ORAL EVERY 6 HOURS PRN
Qty: 40 TABLET | Refills: 1 | Status: SHIPPED | OUTPATIENT
Start: 2021-09-01 | End: 2021-10-06

## 2021-09-01 ASSESSMENT — PAIN SCALES - GENERAL: PAINLEVEL: NO PAIN (0)

## 2021-09-01 ASSESSMENT — PATIENT HEALTH QUESTIONNAIRE - PHQ9: SUM OF ALL RESPONSES TO PHQ QUESTIONS 1-9: 4

## 2021-09-02 LAB
BACTERIA UR CULT: NORMAL
C TRACH DNA SPEC QL NAA+PROBE: NEGATIVE
HBV SURFACE AG SERPL QL IA: NONREACTIVE
HIV 1+2 AB+HIV1 P24 AG SERPL QL IA: NONREACTIVE
N GONORRHOEA DNA SPEC QL NAA+PROBE: NEGATIVE
RUBV IGG SERPL QL IA: 3.03 INDEX
RUBV IGG SERPL QL IA: POSITIVE
T PALLIDUM AB SER QL: NONREACTIVE

## 2021-09-02 ASSESSMENT — ASTHMA QUESTIONNAIRES: ACT_TOTALSCORE: 22

## 2021-09-03 RX ORDER — METRONIDAZOLE 500 MG/1
500 TABLET ORAL 2 TIMES DAILY
Qty: 14 TABLET | Refills: 0 | Status: SHIPPED | OUTPATIENT
Start: 2021-09-03 | End: 2021-09-10

## 2021-10-06 ENCOUNTER — PRENATAL OFFICE VISIT (OUTPATIENT)
Dept: FAMILY MEDICINE | Facility: CLINIC | Age: 26
End: 2021-10-06
Payer: COMMERCIAL

## 2021-10-06 VITALS
OXYGEN SATURATION: 98 % | HEART RATE: 110 BPM | BODY MASS INDEX: 42.04 KG/M2 | WEIGHT: 248 LBS | DIASTOLIC BLOOD PRESSURE: 78 MMHG | SYSTOLIC BLOOD PRESSURE: 126 MMHG | TEMPERATURE: 98 F | RESPIRATION RATE: 14 BRPM

## 2021-10-06 DIAGNOSIS — Z23 NEED FOR PROPHYLACTIC VACCINATION AND INOCULATION AGAINST INFLUENZA: ICD-10-CM

## 2021-10-06 DIAGNOSIS — F33.0 MAJOR DEPRESSIVE DISORDER, RECURRENT EPISODE, MILD (H): ICD-10-CM

## 2021-10-06 DIAGNOSIS — J45.30 MILD PERSISTENT ASTHMA WITHOUT COMPLICATION: ICD-10-CM

## 2021-10-06 DIAGNOSIS — E66.01 SEVERE OBESITY WITH BODY MASS INDEX (BMI) OF 35.0 TO 39.9 WITH SERIOUS COMORBIDITY (H): ICD-10-CM

## 2021-10-06 DIAGNOSIS — J30.2 SEASONAL ALLERGIC RHINITIS, UNSPECIFIED TRIGGER: ICD-10-CM

## 2021-10-06 DIAGNOSIS — F41.1 GENERALIZED ANXIETY DISORDER: ICD-10-CM

## 2021-10-06 DIAGNOSIS — O09.92 HIGH-RISK PREGNANCY IN SECOND TRIMESTER: Primary | ICD-10-CM

## 2021-10-06 PROBLEM — R20.2 LEFT HAND PARESTHESIA: Status: RESOLVED | Noted: 2018-08-23 | Resolved: 2021-10-06

## 2021-10-06 PROBLEM — Z87.59 HISTORY OF POSTPARTUM DEPRESSION: Status: ACTIVE | Noted: 2018-07-05

## 2021-10-06 PROBLEM — Z86.59 HISTORY OF POSTPARTUM DEPRESSION: Status: ACTIVE | Noted: 2018-07-05

## 2021-10-06 PROCEDURE — 99214 OFFICE O/P EST MOD 30 MIN: CPT | Mod: 25 | Performed by: FAMILY MEDICINE

## 2021-10-06 PROCEDURE — 90686 IIV4 VACC NO PRSV 0.5 ML IM: CPT | Performed by: FAMILY MEDICINE

## 2021-10-06 PROCEDURE — 99207 PR PRENATAL VISIT: CPT | Performed by: FAMILY MEDICINE

## 2021-10-06 PROCEDURE — 90471 IMMUNIZATION ADMIN: CPT | Performed by: FAMILY MEDICINE

## 2021-10-06 RX ORDER — BUDESONIDE AND FORMOTEROL FUMARATE DIHYDRATE 160; 4.5 UG/1; UG/1
AEROSOL RESPIRATORY (INHALATION)
Qty: 30.6 G | Refills: 3 | Status: SHIPPED | OUTPATIENT
Start: 2021-10-06 | End: 2022-11-07

## 2021-10-06 RX ORDER — VENLAFAXINE HYDROCHLORIDE 37.5 MG/1
37.5 TABLET, EXTENDED RELEASE ORAL DAILY
Qty: 90 TABLET | Refills: 1 | Status: SHIPPED | OUTPATIENT
Start: 2021-10-06 | End: 2022-04-13

## 2021-10-06 ASSESSMENT — PAIN SCALES - GENERAL: PAINLEVEL: NO PAIN (0)

## 2021-10-06 NOTE — PROGRESS NOTES
Chart review.  High risk OB due to obesity - BMI greater than 40 at the time of pregnancy.    1.  Depression and anxiety are controlled with Effexor.  Did not tolerate Zoloft and was allergic to Prozac.  Effexor hasbeen working well, but could not tolerate the higher dose.  No concern about her depression or anxiety.  Needs its refill today.  Stated she could not tolerate higher dose.   Effexor refilled today.  Symptoms that need to be seen or call in discussed.  Emphasized importance of controlling her mental health during the pregnancy.    2.  Her asthma has been well controlled with Symbicort.  Uses the rescue inhaler rarely.  No smoking.  No coughing or wheezing.  She has chronic nasal congestion due to chronic allergic rhinitis.  Not using the Nasonex by choice.  Taking the Zyrtec as needed and that has helped.   The Symbicort was refilled today.  Continue with the albuterol inhaler as needed.  Not smoking.  Symptoms that need to be seen or call in discussed.  Avoid Hemabate and use labetalol as the last resources.  Emphasized importance of controlling her allergic rhinitis.  Will continue with the Zyrtec as needed.  Still does not want the Nasonex or any type of nasal spray at this time.    3.  Pregnancy wise   - Discussed about second trimester screening tests.  She is interested genetic test.  I informed her that it may be too late for it, but if still interested, I will referred her to  for further evaluation.  She declined at this time - mainly wanting to find out the gender.  She is interested in a second trimester screening test with a quad scree, which was ordered ahnd she was recommended to get it done in the next 2 to 4 weeks.      - Otherwise, she is doing well;  no concern today. No fetal movement yet.  Minimal nausea , no vomitting and tolerated PO intake well. Been drinking a lot of water.  No cramping or abnormal discharge. No UTI symptoms.  No smoking, drug or alcohol.        - Went  over the lab results blood pressure check discussed with her about what to expect in the next four weeks. Educated her about symptoms to call in or be seen.  Continue with Prenatal vitamin and encourage to drink a adequate water. Weight loss noted - will monitor.  All of her questions answered.    Will schedule anatomy ultrasound at around 20 weeks.  RTC 4 weeks.      Rafael Lemus Mai, MD

## 2021-10-13 NOTE — PROGRESS NOTES
SUBJECTIVE:                                                    Kena Sorto is a 26 year old female  @ 15w4d  who presents to clinic today for a first OB:       Estimated Date of Delivery: 2022 is calculated from LMP  She does not have a history of STDs or illicit drug use.  This is a Planned pregnancy and father is involved.  We reviewed her prenatal labs done thus far.  Prenatal Labs:   Lab Results   Component Value Date    ABO O 2018    RH Pos 2018    AS Negative 2021    HEPBANG Nonreactive 2021    CHPCRT Negative 2021    GCPCRT Negative 2021    TREPAB Negative 2018    HGB 13.3 2021    HIV Negative 2012       She's feeling well, without significant fatigue or n/v.   She does plan on breast feeding. Benefits were discussed in detail.  First trimester aneuploidy screening discussed      Problem list and histories reviewed & adjusted, as indicated.  Additional history:     Patient Active Problem List   Diagnosis     Attention deficit disorder     Generalized anxiety disorder     Chronic adenoiditis     Severe obesity (BMI 35.0-39.9)     Major depressive disorder, recurrent episode, mild (H)     Mild persistent asthma without complication     Seasonal allergic rhinitis     History of postpartum depression     High-risk pregnancy     Past Surgical History:   Procedure Laterality Date     HC THERAPUTIC FRACTURE INFER TURBINATE  03/30/10     TONSILLECTOMY & ADENOIDECTOMY  03/30/10    turbinoplasty       Social History     Tobacco Use     Smoking status: Never Smoker     Smokeless tobacco: Never Used     Tobacco comment: no smokers in the household   Substance Use Topics     Alcohol use: No     Family History   Problem Relation Age of Onset     Gastrointestinal Disease Father      Heart Disease Father      Back Pain Father      Diabetes Mother      Hypertension Mother      Hyperlipidemia Mother      Anxiety Disorder Sister      Depression Sister       Hearing Loss Sister         congenital     Coronary Artery Disease Maternal Grandmother      Cerebrovascular Disease Maternal Grandmother      Diabetes Maternal Grandfather         Type II     Cerebrovascular Disease Maternal Grandfather      Cancer Paternal Grandmother         lung (she was a smoker)     Coronary Artery Disease Paternal Grandfather         aortic aneurysm     Food Allergy Daughter 3        peanut     Allergies Daughter      Asthma Daughter            ROS:  Constitutional, HEENT, cardiovascular, pulmonary, gi and gu systems are negative, except as otherwise noted.    OBJECTIVE:                                                    /82   Pulse 110   Temp 98.1  F (36.7  C) (Temporal)   Resp 16   Wt 115.3 kg (254 lb 3.2 oz)   LMP 06/26/2021   SpO2 98%   BMI 43.09 kg/m    Body mass index is 43.09 kg/m .  GENERAL: healthy, alert and no distress  NECK: no adenopathy, no asymmetry, masses, or scars and thyroid normal to palpation  RESP: lungs clear to auscultation - no rales, rhonchi or wheezes  CV: regular rate and rhythm, normal S1 S2, no S3 or S4, no murmur, click or rub, no peripheral edema and peripheral pulses strong  ABDOMEN: soft, nontender, no hepatosplenomegaly, no masses and bowel sounds normal   (female): normal female external genitalia, normal urethral meatus, vaginal mucosa, normal cervix/adnexa/uterus without masses or discharge  PELVIS:   Adequate  MS: no gross musculoskeletal defects noted, no edema       ASSESSMENT/PLAN:                                                        ICD-10-CM    1. Encounter for supervision of other normal pregnancy in first trimester  Z34.81 Wet prep - Clinic Collect     Chlamydia trachomatis PCR     Neisseria gonorrhoeae PCR     Neisseria gonorrhoeae PCR     Chlamydia trachomatis PCR   2. Nausea and vomiting in pregnancy  O21.9 DISCONTINUED: ondansetron (ZOFRAN-ODT) 4 MG ODT tab   3. BV (bacterial vaginosis)  N76.0 metroNIDAZOLE (FLAGYL) 500 MG  tablet    B96.89      declined Quad screen    Instructed on use of triage nurse line and contacting L/D after hours for an urgent need such as fever, vagina bleeding, bladder or vaginal infection, rupture of membranes,  or term labor.  Discussed the indications, uses for and false positives for quad screen and fetal survey ultrasound at 18-20 weeks gestation. Instructed on best evidence for: weight gain for her BMI for pregnancy; healthy diet and foods to avoid; exercise and activity during pregnancy;avoiding exposure to toxoplasmosis; and maintenance of a generally healthy lifestyle. Discussed the harms, benefits, side effects and alternative therapies for current prescribed and OTC medications.    Prenatal screening discussion: We discussed that a genetic test is optional. The cell free DNA test looks for a few issues with baby such as Down's syndrome and similar anueploidy; a positive in baby is suggestive of genetic disease and would be referred to perinatology, but false positives occur. The Standard panel can look into mom's DNA to screen for carrier status of multiple genetic issues, usually only effecting baby if father has the same carrier status.The test does not guarantee a healthy baby and cannot detect all diseases. A third option would be to test for cystic fibrosis and spinal muscular atrophy as recommended as basic screening per ACOG, and adding Fragile X premutation carrier screening with a family history of intellectual disability, developmental delay, or autism. Gender only testing is also an option. All of this requires a referral to a genetic counselor in [place] with [facility]. Can also consider quad screen at 15-18wks and nuchal translucency testing at 10-13wks. Cell free DNA testing can be done anytime after 10wks. AFP measurements can be done, but the 20 wk US adequately looks at the spine for spina bifida.     RECOMMENDED WEIGHT GAIN: 15-25 lbs.    Lance Tang MD  [facility]  [facility]

## 2021-10-14 ENCOUNTER — E-VISIT (OUTPATIENT)
Dept: FAMILY MEDICINE | Facility: CLINIC | Age: 26
End: 2021-10-14
Payer: COMMERCIAL

## 2021-10-14 DIAGNOSIS — J01.90 ACUTE SINUSITIS WITH SYMPTOMS > 10 DAYS: Primary | ICD-10-CM

## 2021-10-14 PROCEDURE — 99421 OL DIG E/M SVC 5-10 MIN: CPT

## 2021-10-16 ENCOUNTER — E-VISIT (OUTPATIENT)
Dept: FAMILY MEDICINE | Facility: CLINIC | Age: 26
End: 2021-10-16
Payer: COMMERCIAL

## 2021-10-16 DIAGNOSIS — J30.2 SEASONAL ALLERGIC RHINITIS, UNSPECIFIED TRIGGER: Primary | ICD-10-CM

## 2021-10-16 PROCEDURE — 99207 PR NON-BILLABLE SERV PER CHARTING: CPT

## 2021-10-18 ENCOUNTER — E-VISIT (OUTPATIENT)
Dept: FAMILY MEDICINE | Facility: CLINIC | Age: 26
End: 2021-10-18
Payer: COMMERCIAL

## 2021-10-18 ENCOUNTER — NURSE TRIAGE (OUTPATIENT)
Dept: NURSING | Facility: CLINIC | Age: 26
End: 2021-10-18

## 2021-10-18 DIAGNOSIS — J06.9 VIRAL URI: Primary | ICD-10-CM

## 2021-10-18 DIAGNOSIS — J06.9 ACUTE URI: Primary | ICD-10-CM

## 2021-10-18 PROCEDURE — 99421 OL DIG E/M SVC 5-10 MIN: CPT | Performed by: FAMILY MEDICINE

## 2021-10-18 NOTE — PATIENT INSTRUCTIONS
The symptoms you describe suggest a viral cause, which is much more common than a bacterial cause. Antibiotics will treat bacterial infections, but have no effect on viral infections. If possible, especially if improving, start with symptom care for the first 7-10 days, then consider seeking further treatment or taking an antibiotic. Bacterial infections generally are more severe, including symptoms such as pus, fever over 101degrees F, or rapidly worsening.

## 2021-10-18 NOTE — TELEPHONE ENCOUNTER
"Pt reports suspecting sinus infection.  Already did E-Visit today.  Provider response as follows:  Jaret Escudero - saw your evisit. Sounds like cold has moved into your sinus's. This is part of the same viral issue. Usually another 1-2 days and people are feeling MUCH better. For now you should continue with the 1000 mg tylenol every 6 hrs for pain/fever, and you can also rinse the nose/sinus's. Sounds weird, but you can Put a pinch of salt in some warm water and basically breath it slowly into the nose, and then let it back out before it goes into your throat. This will relieve the sinus pressure. If everything does not improve by wed, let me know we should consider some antibiotics then, because it takes a week until some bacteria can settle in there. So hope that helps, keep in touch this week if not improving. And lots of rest.  _______________    Pt reports \"This is not just a little sinus pressure.\"  \"This is a sinus infection.\"  \"I've had sinus infections in the past.\"    Cough and nasal congestion -> Onset six days ago.  Currently also pregnant.    Takes Zyrtec regularly and Symbicort for asthma and seasonal allergies.    Frontal headache has become severe per pt's report.  \"Has moved to both my ears.\"    Has been using saline nasal spray.  No relief.  Also tried Sudafed without benefit.    Has taken Tylenol for frontal headache.  No benefit.  Therefore took ibuprofen \"even though not recommended during pregnancy.\"    Pt hopes for antibiotic Rx please.  States \"This is not just sinus pressure.\"    \"Had some sweats overnight.\"  Current temp 99.3 (via temporal scanner) with 1000 mg Tylenol + 400 mg ibuprofen, all taken within the past four hours.    Pharmacy verified in chart -> [facility].    Best phone # for pt -> [phone]     Thank you-    Saira [facility] Nurse Advisor     Additional Information    Negative: Sounds like a life-threatening emergency to the triager    Negative: " "Difficulty breathing, and not from stuffy nose (e.g., not relieved by cleaning out the nose)    Negative: SEVERE headache and has fever    Negative: Patient sounds very sick or weak to the triager    SEVERE sinus pain    Negative: Severe headache    Redness or swelling on the cheek, forehead, or around the eye    Protocols used: SINUS PAIN AND CONGESTION-A-OH    _____________________    COVID 19 Nurse Triage Plan/Patient Instructions    Please be aware that novel coronavirus (COVID-19) may be circulating in the community. If you develop symptoms such as fever, cough, or SOB or if you have concerns about the presence of another infection including coronavirus (COVID-19), please contact your health care provider or visit [email or web address].     Disposition/Instructions    Additional COVID19 information to add for patients.   How can I protect others?  If you have symptoms (fever, cough, body aches or trouble breathing): Stay home and away from others (self-isolate) until:    At least 10 days have passed since your symptoms started, And     You ve had no fever--and no medicine that reduces fever--for 1 full day (24 hours), And      Your other symptoms have resolved (gotten better).     If you don t have symptoms, but a test showed that you have COVID-19 (you tested positive):    Stay home and away from others (self-isolate). Follow the tips under \"How do I self-isolate?\" below for 10 days (20 days if you have a weak immune system).    You don't need to be retested for COVID-19 before going back to school or work. As long as you're fever-free and feeling better, you can go back to school, work and other activities after waiting the 10 or 20 days.     How do I self-isolate?    Stay in your own room, even for meals. Use your own bathroom if you can.     Stay away from others in your home. No hugging, kissing or shaking hands. No visitors.    Don t go to work, school or anywhere else.     Clean  high touch  " surfaces often (doorknobs, counters, handles, etc.). Use a household cleaning spray or wipes. You ll find a full list on the EPA website:  www.epa.gov/pesticide-registration/list-n-disinfectants-use-against-sars-cov-2.    Cover your mouth and nose with a mask, tissue or washcloth to avoid spreading germs.    Wash your hands and face often. Use soap and water.    Caregivers in these groups are at risk for severe illness due to COVID-19:  o People 65 years and older  o People who live in a nursing home or long-term care facility  o People with chronic disease (lung, heart, cancer, diabetes, kidney, liver, immunologic)  o People who have a weakened immune system, including those who:  - Are in cancer treatment  - Take medicine that weakens the immune system, such as corticosteroids  - Had a bone marrow or organ transplant  - Have an immune deficiency  - Have poorly controlled HIV or AIDS  - Are obese (body mass index of 40 or higher)  - Smoke regularly    Caregivers should wear gloves while washing dishes, handling laundry and cleaning bedrooms and bathrooms.    Use caution when washing and drying laundry: Don t shake dirty laundry, and use the warmest water setting that you can.    For more tips, go to www.cdc.gov/coronavirus/2019-ncov/downloads/10Things.pdf.    How can I take care of myself?  1. Get lots of rest. Drink extra fluids (unless a doctor has told you not to).     2. Take Tylenol (acetaminophen) for fever or pain. If you have liver or kidney problems, ask your family doctor if it s okay to take Tylenol.     Adults can take either:     650 mg (two 325 mg pills) every 4 to 6 hours, or     1,000 mg (two 500 mg pills) every 8 hours as needed.     Note: Don t take more than 3,000 mg in one day.   Acetaminophen is found in many medicines (both prescribed and over-the-counter medicines). Read all labels to be sure you don t take too much.     For children, check the Tylenol bottle for the right dose. The dose is  based on the child s age or weight.    3. If you have other health problems (like cancer, heart failure, an organ transplant or severe kidney disease): Call your specialty clinic if you don t feel better in the next 2 days.    4. Know when to call 911: Emergency warning signs include:    Trouble breathing or shortness of breath    Pain or pressure in the chest that doesn t go away    Feeling confused like you haven t felt before, or not being able to wake up    Bluish-colored lips or face    What are the symptoms of COVID-19?     The most common symptoms are cough, fever and trouble breathing.     Less common symptoms include body aches, chills, diarrhea (loose, watery poops), fatigue (feeling very tired), headache, runny nose, sore throat and loss of smell.    COVID-19 can cause severe coughing (bronchitis) and lung infection (pneumonia).    How does it spread?     The virus may spread when a person coughs or sneezes into the air. The virus can travel about 6 feet this way, and it can live on surfaces.      Common  (household disinfectants) will kill the virus.    Who is at risk?  Anyone can catch COVID-19 if they re around someone who has the virus.    How can others protect themselves?     Stay away from people who have COVID-19 (or symptoms of COVID-19).    Wash hands often with soap and water. Or, use hand  with at least 60% alcohol.    Avoid touching the eyes, nose or mouth.     Wear a face mask when you go out in public, when sick or when caring for a sick person.    Where can I get more information?     [address]: About COVID-19: [email or web address]    CDC: What to Do If You re Sick: www.cdc.gov/coronavirus/2019-ncov/about/steps-when-sick.html    CDC: Ending Home Isolation: www.cdc.gov/coronavirus/2019-ncov/hcp/disposition-in-home-patients.html     CDC: Caring for Someone: www.cdc.gov/coronavirus/2019-ncov/if-you-are-sick/care-for-someone.html     [facility]: Interim Guidance  for Hospital Discharge to Home: [email or web address]/[email or web address]/hcp/hospdischarge.pdf    [facility] clinical trials (COVID-19 research studies): clinicalaffairs.[facility]-clinical-trials     Below are the COVID-19 hotlines at the [place] Department of [facility] ([facility]). Interpreters are available.   o For health questions: Call 690-065-0351 or 1-392.792.8142 (7 a.m. to 7 p.m.)  o For questions about schools and childcare: Call 870-355-7395 or 1-372.257.2422 (7 a.m. to 7 p.m.)          Thank you for taking steps to prevent the spread of this virus.  o Limit your contact with others.  o Wear a simple mask to cover your cough.  o Wash your hands well and often.    Resources    [facility]: About COVID-19: [email or web address]    CDC: What to Do If You're Sick: www.cdc.gov/coronavirus/2019-ncov/about/steps-when-sick.html    CDC: Ending Home Isolation: www.cdc.gov/coronavirus/2019-ncov/hcp/disposition-in-home-patients.html     CDC: Caring for Someone: www.cdc.gov/coronavirus/2019-ncov/if-you-are-sick/care-for-someone.html     [facility]: Interim Guidance for Hospital Discharge to Home: www.[email or web address]/[email or web address]/hcp/hospdischarge.pdf    [facility] clinical trials (COVID-19 research studies): [email or web address]/n-clinical-trials     Below are the COVID-19 hotlines at the [place] Department [facility] ([facility]). Interpreters are available.   o For health questions: Call 116-720-1355 or 1-573.217.9624 (7 a.m. to 7 p.m.)  o For questions about schools and childcare: Call 185-117-0309 or 1-848.999.6974 (7 a.m. to 7 p.m.)

## 2021-10-19 RX ORDER — CLINDAMYCIN HCL 300 MG
300 CAPSULE ORAL 3 TIMES DAILY
Qty: 21 CAPSULE | Refills: 0 | Status: SHIPPED | OUTPATIENT
Start: 2021-10-19 | End: 2021-10-26

## 2021-11-03 ENCOUNTER — PRENATAL OFFICE VISIT (OUTPATIENT)
Dept: FAMILY MEDICINE | Facility: CLINIC | Age: 26
End: 2021-11-03
Payer: COMMERCIAL

## 2021-11-03 VITALS
WEIGHT: 250 LBS | SYSTOLIC BLOOD PRESSURE: 118 MMHG | TEMPERATURE: 97.1 F | OXYGEN SATURATION: 99 % | BODY MASS INDEX: 42.38 KG/M2 | DIASTOLIC BLOOD PRESSURE: 68 MMHG | HEART RATE: 107 BPM | RESPIRATION RATE: 20 BRPM

## 2021-11-03 DIAGNOSIS — O09.92 HIGH-RISK PREGNANCY IN SECOND TRIMESTER: ICD-10-CM

## 2021-11-03 PROCEDURE — 99000 SPECIMEN HANDLING OFFICE-LAB: CPT | Performed by: FAMILY MEDICINE

## 2021-11-03 PROCEDURE — 81511 FTL CGEN ABNOR FOUR ANAL: CPT | Mod: 90 | Performed by: FAMILY MEDICINE

## 2021-11-03 PROCEDURE — 36415 COLL VENOUS BLD VENIPUNCTURE: CPT | Performed by: FAMILY MEDICINE

## 2021-11-03 PROCEDURE — 99207 PR PRENATAL VISIT: CPT | Performed by: FAMILY MEDICINE

## 2021-11-03 ASSESSMENT — PAIN SCALES - GENERAL: PAINLEVEL: NO PAIN (0)

## 2021-11-03 NOTE — PROGRESS NOTES
Concerns today:   Doing well.  No concerns today.  No nausea/vomiting. No heartburn.  No vaginal bleeding, no contractions/severe cramping, no leakage of fluid.  No vaginal discharge. No dysuria  No headache, vision changes, lower extremity swelling, upper abdominal pain, chest pain, shortness of breath.     Pregnancy Risks:   1. Asthma. Controlled on symbicort currently  2. Depression/anxiety. Controlled on effextor  3. Obesity. Pre-gravid BMI> 40    Prenatal Labs:   Lab Results   Component Value Date    ABO O 06/29/2018    RH Pos 06/29/2018    AS Negative 09/01/2021    HEPBANG Nonreactive 09/01/2021    CHPCRT Negative 09/01/2021    GCPCRT Negative 09/01/2021    TREPAB Negative 04/16/2018    HGB 13.3 09/01/2021    HIV Negative 11/20/2012        Lab Results   Component Value Date    PAP NIL 02/05/2020       Estimated Date of Delivery: Apr 2, 2022     3rd Trimester  GBS: __  Glucola:  ___  Imms: DtaP __, Flu__, COVID _2/2021_    MD Lance Bee MD

## 2021-11-08 ENCOUNTER — E-VISIT (OUTPATIENT)
Dept: URGENT CARE | Facility: URGENT CARE | Age: 26
End: 2021-11-08
Payer: COMMERCIAL

## 2021-11-08 DIAGNOSIS — Z20.822 CLOSE EXPOSURE TO 2019 NOVEL CORONAVIRUS: Primary | ICD-10-CM

## 2021-11-08 PROCEDURE — 99421 OL DIG E/M SVC 5-10 MIN: CPT | Performed by: PHYSICIAN ASSISTANT

## 2021-11-08 NOTE — PATIENT INSTRUCTIONS
"  Dear Kena Sorto,    Based on your exposure to COVID-19 (coronavirus), we would like to test you for this virus. I have placed an order for this test.The best time for testing is 5-7 days after the exposure.    How to schedule:  Go to your [employer] home page and scroll down to the section that says  You have an appointment that needs to be scheduled  and click the large green button that says  Schedule Now  and follow the steps to find the next available opening.     If you are unable to complete these [employer] scheduling steps, please call [phone] to schedule your testing.     Return to work/school/ guidance:   For people with high risk exposures outside the home    Please let your workplace manager and staffing office know when your quarantine ends.     We can not give you an exact date as it depends on the information below. You can calculate this on your own or work with your manager/staffing office to calculate this. (For example if you were exposed on 10/4, you would have to quarantine for 14 full days. That would be through 10/18. You could return on 10/19.)    Quarantine Guidelines:  Patients (\"contacts\") who have been in close prolonged contact of an infected person(s) (within six feet for at least 15 minutes within a 24 hour period), and remain asymptomatic should enter quarantine based on the following options:    14-day quarantine period (this remains the CDC recommendation for the greatest protection against spread of COVID-19) OR    Minimum 7-day quarantine with negative RT-PCR test collected on day 5 or later OR    10-day quarantine with no test  Quarantine Guideline exceptions are as follows:    People who have been fully vaccinated do not need to quarantine if the exposure was at least 2 weeks after the last vaccination. This includes vaccinated health care workers.    Not fully vaccinated and unvaccinated Individuals who work in health care, congregate care, or congregate living " should be off work for 14 days from their last date of exposure. Community activities for this group can be resumed based on options above. Fully vaccinated individuals in this group do not need to quarantine from work after exposure.    Not fully vaccinated and unvaccinated people whose high-risk exposure was a household member should always quarantine for 14 days from their last date of exposure. Fully vaccinated people in this category do not need to quarantine.    Not fully vaccinated or unvaccinated residents of congregate care and congregate living settings should always quarantine for 14 days from their last date of exposure. Fully vaccinated residents do not need to quarantine.  Note: If you have ongoing exposure to the covid positive person, this quarantine period may be more than 14 days. (For example, if you are continued to be exposed to your child who tested positive and cannot isolate from them, then the quarantine of 7-14 days can't start until your child is no longer contagious. This is typically 10 days from onset of the child's symptoms. So the total duration may be 17-24 days in this case.)    You should continue symptom monitoring until day 14 post-exposure. If you develop signs or symptoms of COVID-19, isolate and get tested (even if you have been tested already).    How to quarantine:   Stay home and away from others. Don't go to school or anywhere else. Generally quarantine means staying home from work but there are some exceptions to this. Please contact your workplace.  No hugging, kissing or shaking hands.  Don't let anyone visit.  Cover your mouth and nose with a mask, tissue or washcloth to avoid spreading germs.  Wash your hands and face often. Use soap and water.    What are the symptoms of COVID-19?  The most common symptoms are cough, fever and trouble breathing. Less common symptoms include headache, body aches, fatigue (feeling very tired), chills, sore throat, stuffy or runny nose,  diarrhea (loose poop), loss of taste or smell, belly pain, and nausea or vomiting (feeling sick to your stomach or throwing up).  After 14 days, if you have still don't have symptoms, you likely don't have this virus.  If you develop symptoms, follow these guidelines.  If you're normally healthy: Please start another eVisit.  If you have a serious health problem (like cancer, heart failure, an organ transplant or kidney disease): Call your specialty clinic. Let them know that you might have COVID-19.    Where can I get more information?  [facility] - About COVID-19: [email or web address]  CDC - What to Do If You're Sick: www.cdc.gov/coronavirus/2019-ncov/about/steps-when-sick.html  CDC - Ending Home Isolation: www.cdc.gov/coronavirus/2019-ncov/hcp/disposition-in-home-patients.html  CDC - Caring for Someone: www.cdc.gov/coronavirus/2019-ncov/if-you-are-sick/care-for-someone.html  [facility] clinical trials (COVID-19 research studies): clinicalaffairs.[facility]-clinical-trials  Below are the COVID-19 hotlines at the [place] Department of Health ([facility]). Interpreters are available.  For health questions: Call 623-497-4922 or 1-446.666.1583 (7 a.m. to 7 p.m.)  For questions about schools and childcare: Call 072-371-7452 or 1-432.202.9823 (7 a.m. to 7 p.m.)        November 8, 2021  RE:  Kena Sorto                                                                                                                   [address]  [facility] [address]      To whom it may concern:    I evaluated Kena Sorto on November 8, 2021. Kena Sorto should be excused from work/school.    They should let their workplace manager and staffing office know when their quarantine ends.    We can not give an exact date as it depends on the information below. They can calculate this on their own or work with their manager/staffing office to calculate this. (For example if they were exposed on 10/04,  "they would have to quarantine for 14 full days. That would be through 10/18. They could return on 10/19.)    Quarantine Guidelines:    Patients (\"contacts\") who have been in close prolonged contact of an infected person(s) (within six feet for at least 15 minutes within a 24 hour period) and remain asymptomatic should enter quarantine based on the following options:      14-day quarantine period (this remains the CDC recommendation for the greatest protection against spread of COVID-19) OR    Minimum 7-day quarantine with negative RT-PCR test collected on day 5 or later OR    10-day quarantine with no test   Quarantine Guideline exceptions are as follows:    People who have been fully vaccinated do not need to quarantine if the exposure was at least 2 weeks after the last vaccination. This includes vaccinated health care workers.    Not fully vaccinated and unvaccinated Individuals who work in health care, congregate care, or congregate living should be off work for 14 days from their last date of exposure. Community activities for this group can be resumed based on options above. Fully vaccinated individuals in this group do not need to quarantine from work after exposure.    Not fully vaccinated and unvaccinated people whose high-risk exposure was a household member should always quarantine for 14 days from their last date of exposure. Fully vaccinated people in this category do not need to quarantine.    Not fully vaccinated or unvaccinated residents of congregate care and congregate living settings should always quarantine for 14 days from their last date of exposure. Fully vaccinated residents do not need to quarantine.    Note: If there is ongoing exposure to the covid positive person, this quarantine period may be longer than 14 days. (For example, if they are continually exposed to their child, who tested positive and cannot isolate from them, then the quarantine of 7-14 days can't start until their child is " no longer contagious. This is typically 10 days from onset to the child's symptoms. So the total duration may be 17-24 days in this case.)    Kena Sorto should continue symptom monitoring until day 14 post-exposure. If they develop signs or symptoms of COVID-19, they should isolate and get tested (even if they have been tested already).    Sincerely,  Vanessa Monsalve

## 2021-11-15 ENCOUNTER — HOSPITAL ENCOUNTER (OUTPATIENT)
Dept: ULTRASOUND IMAGING | Facility: CLINIC | Age: 26
Discharge: HOME | End: 2021-11-15
Attending: FAMILY MEDICINE | Admitting: FAMILY MEDICINE
Payer: COMMERCIAL

## 2021-11-15 DIAGNOSIS — O09.92 HIGH-RISK PREGNANCY IN SECOND TRIMESTER: ICD-10-CM

## 2021-11-15 PROCEDURE — 76805 OB US >/= 14 WKS SNGL FETUS: CPT

## 2021-12-15 ENCOUNTER — PRENATAL OFFICE VISIT (OUTPATIENT)
Dept: FAMILY MEDICINE | Facility: CLINIC | Age: 26
End: 2021-12-15
Payer: COMMERCIAL

## 2021-12-15 VITALS
BODY MASS INDEX: 42.38 KG/M2 | SYSTOLIC BLOOD PRESSURE: 116 MMHG | HEART RATE: 104 BPM | DIASTOLIC BLOOD PRESSURE: 70 MMHG | OXYGEN SATURATION: 98 % | TEMPERATURE: 98 F | WEIGHT: 250 LBS | RESPIRATION RATE: 20 BRPM

## 2021-12-15 DIAGNOSIS — Z34.90 NORMAL PREGNANCY, ANTEPARTUM: Primary | ICD-10-CM

## 2021-12-15 PROCEDURE — 99207 PR PRENATAL VISIT: CPT | Performed by: FAMILY MEDICINE

## 2021-12-15 ASSESSMENT — PAIN SCALES - GENERAL: PAINLEVEL: NO PAIN (0)

## 2021-12-15 NOTE — PROGRESS NOTES
Concerns:   Doing well.  No concerns today.  No vaginal bleeding, no contractions, no leakage of fluid  No nausea/vomiting. No heartburn  No vaginal discharge. No dysuria.   No headache, vision changes, lower extremity swelling, upper abdominal pain, chest pain, shortness of breath  Tdap planned next visit  Discussed PTL, PROM, and when to call or come in.  Normal anatomy ultrasound.  RTC 4 weeks.  GTT and labs today     Pregnancy Risks:   1. Asthma. Controlled on symbicort currently  2. Depression/anxiety. Controlled on effextor  3. Obesity. Pre-gravid BMI> 40     Prenatal Labs:         Lab Results   Component Value Date     ABO O 06/29/2018     RH Pos 06/29/2018     AS Negative 09/01/2021     HEPBANG Nonreactive 09/01/2021     CHPCRT Negative 09/01/2021     GCPCRT Negative 09/01/2021     TREPAB Negative 04/16/2018     HGB 13.3 09/01/2021     HIV Negative 11/20/2012               Lab Results   Component Value Date     PAP NIL 02/05/2020         Estimated Date of Delivery: Apr 2, 2022      3rd Trimester  GBS: __  Glucola:  ___  Imms: DtaP __, Flu__, COVID _2/2021_    Lance Tang MD

## 2021-12-23 ENCOUNTER — MYC MEDICAL ADVICE (OUTPATIENT)
Dept: FAMILY MEDICINE | Facility: CLINIC | Age: 26
End: 2021-12-23
Payer: COMMERCIAL

## 2021-12-24 NOTE — TELEPHONE ENCOUNTER
Spoke with patient this morning, she reports that she has felt baby move often.  She states that she did start feeling her move last night and baby has had a lot of movement this morning.  Patient reports that she is feeling well.    Educated when to seek care.     Sharlene Askew Rn

## 2021-12-24 NOTE — TELEPHONE ENCOUNTER
Huddled with Clemencia Sarkar. Message sent per her suggestion.   Linda Steel CMA on 12/24/2021 at 6:54 AM

## 2022-01-19 ENCOUNTER — PRENATAL OFFICE VISIT (OUTPATIENT)
Dept: FAMILY MEDICINE | Facility: CLINIC | Age: 27
End: 2022-01-19
Payer: COMMERCIAL

## 2022-01-19 VITALS
BODY MASS INDEX: 43.54 KG/M2 | HEIGHT: 64 IN | OXYGEN SATURATION: 99 % | HEART RATE: 115 BPM | DIASTOLIC BLOOD PRESSURE: 80 MMHG | TEMPERATURE: 97.3 F | WEIGHT: 255 LBS | RESPIRATION RATE: 18 BRPM | SYSTOLIC BLOOD PRESSURE: 122 MMHG

## 2022-01-19 DIAGNOSIS — O09.92 HIGH-RISK PREGNANCY IN SECOND TRIMESTER: Primary | ICD-10-CM

## 2022-01-19 LAB — GLUCOSE 1H P 50 G GLC PO SERPL-MCNC: 123 MG/DL (ref 70–129)

## 2022-01-19 PROCEDURE — 82950 GLUCOSE TEST: CPT | Performed by: FAMILY MEDICINE

## 2022-01-19 PROCEDURE — 99207 PR PRENATAL VISIT: CPT | Performed by: FAMILY MEDICINE

## 2022-01-19 PROCEDURE — 36415 COLL VENOUS BLD VENIPUNCTURE: CPT | Performed by: FAMILY MEDICINE

## 2022-01-19 PROCEDURE — 91306 COVID-19,PF,MODERNA (18+ YRS BOOSTER .25ML): CPT | Performed by: FAMILY MEDICINE

## 2022-01-19 PROCEDURE — 0064A COVID-19,PF,MODERNA (18+ YRS BOOSTER .25ML): CPT | Performed by: FAMILY MEDICINE

## 2022-01-19 ASSESSMENT — PAIN SCALES - GENERAL: PAINLEVEL: NO PAIN (0)

## 2022-01-19 ASSESSMENT — MIFFLIN-ST. JEOR: SCORE: 1891.67

## 2022-01-19 NOTE — PROGRESS NOTES
Concerns:   Doing well.  No concerns today.  No vaginal bleeding, LOF.  No contractions.  Discussed kick counts and fetal movement.  Discussed PTL, PROM, and when to call or come in.  RTC 2 weeks.      Pregnancy Risks:   1. Asthma. Controlled on symbicort currently  2. Depression/anxiety. Controlled on effextor  3. Obesity. Pre-gravid BMI> 40    Prior uneventful , 90min of pushing     Prenatal Labs:             Lab Results   Component Value Date     ABO O 2018     RH Pos 2018     AS Negative 2021     HEPBANG Nonreactive 2021     CHPCRT Negative 2021     GCPCRT Negative 2021     TREPAB Negative 2018     HGB 13.3 2021     HIV Negative 2012                   Lab Results   Component Value Date     PAP NIL 2020         Estimated Date of Delivery: 2022      3rd Trimester  GBS: __  Glucola:  ___  Imms: DtaP __, Flu__, COVID _2021_      Lance Tang MD

## 2022-01-24 ENCOUNTER — E-VISIT (OUTPATIENT)
Dept: OBGYN | Facility: CLINIC | Age: 27
End: 2022-01-24
Payer: COMMERCIAL

## 2022-01-24 DIAGNOSIS — Z20.822 SUSPECTED COVID-19 VIRUS INFECTION: Primary | ICD-10-CM

## 2022-01-24 PROCEDURE — 99421 OL DIG E/M SVC 5-10 MIN: CPT | Performed by: FAMILY MEDICINE

## 2022-01-24 NOTE — PATIENT INSTRUCTIONS
Kena,      Based on your responses, you may have coronavirus (COVID-19). This illness can cause fever, cough and trouble breathing. Many people get a mild case and get better on their own. Some people can get very sick.    Will I be tested for COVID-19?  We would like to test you for COVID-19 virus. I have placed orders for this test.     To schedule: go to your [employer] home page and scroll down to the section that says  You have an appointment that needs to be scheduled  and click the large green button that says  Schedule Now  and follow the steps to find the next available openings.    If you are unable to complete these [employer] scheduling steps, please call [phone] to schedule your testing.     Return to work/school/ guidance:  Please let your workplace manager and staffing office know when your isolation ends.       If you receive a positive COVID-19 test result, follow the guidance of the those who are giving you the results. Usually the return to work is 10 days from symptom onset or positive test date, (or in some cases 20 days if you are immunocompromised). If your symptoms started after your positive test, the 10 days should start when your symptoms started.   o If you work at [employer], you must also be cleared by Employee Occupational Health and Safety to return to work.      If you receive a negative COVID-19 test result and did not have a high risk exposure to someone with a known positive COVID-19 test, you can return to work once you're free of fever for 24 hours without fever-reducing medication and your symptoms are improving or resolved.    If you receive a negative COVID-19 test and had a high-risk exposure to someone who has tested positive for COVID-19 then you can return to work 14 days after your last contact with the positive individual. Follow quarantine guidance given by your doctor or public health officials.     Sign up for Get[employer] Loop:  We know it's scary to hear  that you might have COVID-19. We want to track your symptoms to make sure you're okay over the next 2 weeks. Please look for an email from [employer]--this is a free, online program that we'll use to keep in touch. To sign up, follow the link in the email you will receive. Learn more at [email or web address]    How can I take care of myself?  Over the counter medications may help with your symptoms like congestion, cough, chills, or fever. I have sent in a prescription for .    There are not many effective prescription treatments for early COVID-19. Hydroxychloroquine, ivermectin, and azithromycin are not effective or recommended for COVID-19.    If your symptoms started in the last 10 days, you may be able to receive a treatment with monoclonal antibodies. This treatment can lower your risk of severe illness and going to the hospital. It is given through an IV or under your skin (subcutaneous) and must be given at an infusion center. You must be 12 or older, weight at least 88 pounds, and have a positive COVID-19 test.     If you would like to sign up to be considered to receive the monoclonal antibody medicine, please complete a participation form through the [facility] here: MNRAP ([email or web address]). You may also call the [facility] COVID-19 Public Hotline at 1-248.243.7376 (open Mon-Fri: 9am-7pm and Sat: 10am-6pm).     Not all people who are eligible will receive the medicine, since supply is limited. You will be contacted in the next 1 to 2 business days only if you are selected. If you do not receive a call, you have not been selected to receive the medicine. If you have any questions about this medication, please contact your primary care provider. For more information, see [email or web address]      Get lots of rest. Drink extra fluids (unless a doctor has told you not to)    Take Tylenol  (acetaminophen) or ibuprofen for fever or pain. If you have liver or kidney problems, ask your family doctor if it's okay to take Tylenol o ibuprofen    Take over the counter medications for your symptoms, as directed by your doctor. You may also talk to your pharmacist.      If you have other health problems (like cancer, heart failure, an organ transplant or severe kidney disease): Call your specialty clinic if you don't feel better in the next 2 days.    Know when to call 911. Emergency warning signs include:  o Trouble breathing or shortness of breath  o Pain or pressure in the chest that doesn't go away  o Feeling confused like you haven't felt before, or not being able to wake up  o Bluish-colored lips or face    Where can I get more information?    [facility] - About COVID-19: [email or web address]     CDC - What to Do If You're Sick:     www.cdc.gov/coronavirus/2019-ncov/about/steps-when-sick.html    CDC - Ending Home Isolation:  https://www.cdc.gov/coronavirus/2019-ncov/your-health/quarantine-isolation.html    CDC - Caring for Someone:  www.cdc.gov/coronavirus/2019-ncov/if-you-are-sick/care-for-someone.html    [facility] clinical trials (COVID-19 research studies): clinicalaffairs.[facility]/n-clinical-trials    Below are the COVID-19 hotlines at the [facility] ([facility]). Interpreters are available.  o For health questions: Call 150-298-5893 or 1-871.612.7788 (7 a.m. to 7 p.m.)  o For questions about schools and childcare: Call 945-155-9073 or 1-770.341.7953 (7 a.m. to 7 p.m.)

## 2022-01-26 ENCOUNTER — TELEPHONE (OUTPATIENT)
Dept: OBGYN | Facility: CLINIC | Age: 27
End: 2022-01-26
Payer: COMMERCIAL

## 2022-01-26 NOTE — TELEPHONE ENCOUNTER
"Coronavirus (COVID-19) Notification    Caller Name (Patient, parent, daughter/son, grandparent, etc)  Patient    Reason for call  Notify of Positive Coronavirus (COVID-19) lab results, assess symptoms,  review [facility] recommendations    Lab Result    Lab test:  2019-nCoV rRt-PCR or SARS-CoV-2 PCR    Oropharyngeal AND/OR nasopharyngeal swabs is POSITIVE for 2019-nCoV RNA/SARS-COV-2 PCR (COVID-19 virus)    RN Recommendations/Instructions per [facility] Coronavirus COVID-19 recommendations    Brief introduction script  Introduce self then review script:  \"I am calling on behalf of [employer]iando.  We were notified that your Coronavirus test (COVID-19) for was POSITIVE for the virus.    Patient refuses all Covid education, reporting she was already notified.    A Positive COVID-19 letter will be sent via [employer] or the mail. (Exception, no letters sent to Presurgerical/Preprocedure Patients)    Cris Tavera LPN      "

## 2022-02-09 ENCOUNTER — PRENATAL OFFICE VISIT (OUTPATIENT)
Dept: FAMILY MEDICINE | Facility: CLINIC | Age: 27
End: 2022-02-09
Payer: COMMERCIAL

## 2022-02-09 VITALS
RESPIRATION RATE: 20 BRPM | SYSTOLIC BLOOD PRESSURE: 122 MMHG | DIASTOLIC BLOOD PRESSURE: 84 MMHG | HEART RATE: 111 BPM | TEMPERATURE: 97.4 F | WEIGHT: 259 LBS | BODY MASS INDEX: 44.46 KG/M2 | OXYGEN SATURATION: 98 %

## 2022-02-09 DIAGNOSIS — O09.92 HIGH-RISK PREGNANCY IN SECOND TRIMESTER: Primary | ICD-10-CM

## 2022-02-09 LAB
HGB BLD-MCNC: 12.1 G/DL (ref 11.7–15.7)
T PALLIDUM AB SER QL: NONREACTIVE

## 2022-02-09 PROCEDURE — 90715 TDAP VACCINE 7 YRS/> IM: CPT | Performed by: FAMILY MEDICINE

## 2022-02-09 PROCEDURE — 99207 PR PRENATAL VISIT: CPT | Performed by: FAMILY MEDICINE

## 2022-02-09 PROCEDURE — 90471 IMMUNIZATION ADMIN: CPT | Performed by: FAMILY MEDICINE

## 2022-02-09 PROCEDURE — 85018 HEMOGLOBIN: CPT | Performed by: FAMILY MEDICINE

## 2022-02-09 PROCEDURE — 86780 TREPONEMA PALLIDUM: CPT | Performed by: FAMILY MEDICINE

## 2022-02-09 PROCEDURE — 36415 COLL VENOUS BLD VENIPUNCTURE: CPT | Performed by: FAMILY MEDICINE

## 2022-02-09 ASSESSMENT — PAIN SCALES - GENERAL: PAINLEVEL: NO PAIN (0)

## 2022-02-23 ENCOUNTER — PRENATAL OFFICE VISIT (OUTPATIENT)
Dept: FAMILY MEDICINE | Facility: CLINIC | Age: 27
End: 2022-02-23
Payer: COMMERCIAL

## 2022-02-23 VITALS
OXYGEN SATURATION: 99 % | SYSTOLIC BLOOD PRESSURE: 122 MMHG | HEART RATE: 105 BPM | WEIGHT: 264 LBS | TEMPERATURE: 97.2 F | BODY MASS INDEX: 45.32 KG/M2 | DIASTOLIC BLOOD PRESSURE: 68 MMHG

## 2022-02-23 DIAGNOSIS — O09.93 HIGH-RISK PREGNANCY IN THIRD TRIMESTER: Primary | ICD-10-CM

## 2022-02-23 PROCEDURE — 99207 PR PRENATAL VISIT: CPT | Performed by: FAMILY MEDICINE

## 2022-02-23 ASSESSMENT — ANXIETY QUESTIONNAIRES
1. FEELING NERVOUS, ANXIOUS, OR ON EDGE: SEVERAL DAYS
3. WORRYING TOO MUCH ABOUT DIFFERENT THINGS: SEVERAL DAYS
GAD7 TOTAL SCORE: 4
5. BEING SO RESTLESS THAT IT IS HARD TO SIT STILL: NOT AT ALL
6. BECOMING EASILY ANNOYED OR IRRITABLE: SEVERAL DAYS
2. NOT BEING ABLE TO STOP OR CONTROL WORRYING: SEVERAL DAYS
IF YOU CHECKED OFF ANY PROBLEMS ON THIS QUESTIONNAIRE, HOW DIFFICULT HAVE THESE PROBLEMS MADE IT FOR YOU TO DO YOUR WORK, TAKE CARE OF THINGS AT HOME, OR GET ALONG WITH OTHER PEOPLE: SOMEWHAT DIFFICULT
7. FEELING AFRAID AS IF SOMETHING AWFUL MIGHT HAPPEN: NOT AT ALL

## 2022-02-23 ASSESSMENT — PATIENT HEALTH QUESTIONNAIRE - PHQ9
5. POOR APPETITE OR OVEREATING: NOT AT ALL
SUM OF ALL RESPONSES TO PHQ QUESTIONS 1-9: 3

## 2022-02-23 ASSESSMENT — PAIN SCALES - GENERAL: PAINLEVEL: NO PAIN (0)

## 2022-02-23 ASSESSMENT — ASTHMA QUESTIONNAIRES: ACT_TOTALSCORE: 24

## 2022-02-23 NOTE — PROGRESS NOTES
Concerns: decreased movement, less than 'usual'    NST reactive, does have good movement (>10 kicks) at least once daily,  No vaginal bleeding, LOF.  No contractions.  Discussed kick counts and fetal movement.  Discussed PTL, PROM, and when to call or come in.  RTC 2 weeks.    Lance Tang MD       Pregnancy Risks:   1. Asthma. Controlled on symbicort currently  2. Depression/anxiety. Controlled on effextor  3. Obesity. Pre-gravid BMI> 40. Weekly BPP at 36 wks     Prior uneventful , 90min of pushing     Prenatal Labs:             Lab Results   Component Value Date     ABO O 2018     RH Pos 2018     AS Negative 2021     HEPBANG Nonreactive 2021     CHPCRT Negative 2021     GCPCRT Negative 2021     TREPAB Negative 2018     HGB 13.3 2021     HIV Negative 2012                   Lab Results   Component Value Date     PAP NIL 2020         Estimated Date of Delivery: 2022      3rd Trimester  GBS: __  Glucola:  ___  Imms: DtaP __, Flu__, COVID _2021_

## 2022-02-24 ASSESSMENT — ANXIETY QUESTIONNAIRES: GAD7 TOTAL SCORE: 4

## 2022-03-07 ENCOUNTER — E-VISIT (OUTPATIENT)
Dept: FAMILY MEDICINE | Facility: CLINIC | Age: 27
End: 2022-03-07
Payer: COMMERCIAL

## 2022-03-07 DIAGNOSIS — R11.0 NAUSEA: Primary | ICD-10-CM

## 2022-03-07 PROCEDURE — 99421 OL DIG E/M SVC 5-10 MIN: CPT | Performed by: FAMILY MEDICINE

## 2022-03-07 RX ORDER — ONDANSETRON 4 MG/1
4 TABLET, ORALLY DISINTEGRATING ORAL EVERY 8 HOURS PRN
Qty: 20 TABLET | Refills: 1 | Status: ON HOLD | OUTPATIENT
Start: 2022-03-07 | End: 2022-03-24

## 2022-03-07 NOTE — PATIENT INSTRUCTIONS
Thank you for choosing us for your care. I have placed an order for a prescription so that you can start treatment. View your full visit summary for details by clicking on the link below. Your pharmacist will able to address any questions you may have about the medication.     If you're not feeling better within 5-7 days, please schedule an appointment.  You can schedule an appointment right here in [facility], or call [phone]  If the visit is for the same symptoms as your eVisit, we'll refund the cost of your eVisit if seen within seven days.

## 2022-03-07 NOTE — TELEPHONE ENCOUNTER
Provider E-Visit time total (minutes): 5    Discussed with patient over the phone.  New onset stomach flu, starting to resolve in the past couple of hours.  Total time sick just about 7 hours.  Sending Zofran just in case.  Feeling baby move.  No overt signs of dehydration.  No lightheadedness.

## 2022-03-09 ENCOUNTER — PRENATAL OFFICE VISIT (OUTPATIENT)
Dept: FAMILY MEDICINE | Facility: CLINIC | Age: 27
End: 2022-03-09
Payer: COMMERCIAL

## 2022-03-09 VITALS
SYSTOLIC BLOOD PRESSURE: 128 MMHG | RESPIRATION RATE: 14 BRPM | WEIGHT: 264 LBS | BODY MASS INDEX: 45.32 KG/M2 | HEART RATE: 98 BPM | TEMPERATURE: 97.5 F | DIASTOLIC BLOOD PRESSURE: 88 MMHG | OXYGEN SATURATION: 99 %

## 2022-03-09 DIAGNOSIS — O09.93 HIGH-RISK PREGNANCY IN THIRD TRIMESTER: Primary | ICD-10-CM

## 2022-03-09 DIAGNOSIS — E66.01 SEVERE OBESITY WITH BODY MASS INDEX (BMI) OF 35.0 TO 39.9 WITH SERIOUS COMORBIDITY (H): ICD-10-CM

## 2022-03-09 PROCEDURE — 99207 PR PRENATAL VISIT: CPT | Performed by: FAMILY MEDICINE

## 2022-03-09 PROCEDURE — 87653 STREP B DNA AMP PROBE: CPT | Performed by: FAMILY MEDICINE

## 2022-03-09 ASSESSMENT — PAIN SCALES - GENERAL: PAINLEVEL: NO PAIN (0)

## 2022-03-09 NOTE — PROGRESS NOTES
Concerns:    .  Doing well.  No concerns today.  No vaginal bleeding, LOF, contractions.  No HA, RUQ pain, N/V, visual changes.  GBS done today.  Labor precautions discussed.  RTC 1 week.  Prenatal flowsheet information is reviewed.    Lance Tang MD       Pregnancy Risks:   1. Asthma. Controlled on symbicort currently  2. Depression/anxiety. Controlled on effextor  3. Obesity. Pre-gravid BMI> 40. Weekly BPP at 36 wks     Prior uneventful , 90min of pushing     Prenatal Labs:             Lab Results   Component Value Date     ABO O 2018     RH Pos 2018     AS Negative 2021     HEPBANG Nonreactive 2021     CHPCRT Negative 2021     GCPCRT Negative 2021     TREPAB Negative 2018     HGB 13.3 2021     HIV Negative 2012                   Lab Results   Component Value Date     PAP NIL 2020         Estimated Date of Delivery: 2022      3rd Trimester  GBS: __  Glucola:  ___  Imms: DtaP __, Flu__, COVID _2021_

## 2022-03-10 LAB — GP B STREP DNA SPEC QL NAA+PROBE: NEGATIVE

## 2022-03-16 ENCOUNTER — PRENATAL OFFICE VISIT (OUTPATIENT)
Dept: FAMILY MEDICINE | Facility: CLINIC | Age: 27
End: 2022-03-16
Payer: COMMERCIAL

## 2022-03-16 ENCOUNTER — HOSPITAL ENCOUNTER (OUTPATIENT)
Dept: ULTRASOUND IMAGING | Facility: CLINIC | Age: 27
Discharge: HOME | End: 2022-03-16
Attending: FAMILY MEDICINE | Admitting: FAMILY MEDICINE
Payer: COMMERCIAL

## 2022-03-16 VITALS
RESPIRATION RATE: 14 BRPM | HEART RATE: 94 BPM | OXYGEN SATURATION: 97 % | WEIGHT: 265 LBS | BODY MASS INDEX: 45.49 KG/M2 | SYSTOLIC BLOOD PRESSURE: 118 MMHG | TEMPERATURE: 97.9 F | DIASTOLIC BLOOD PRESSURE: 82 MMHG

## 2022-03-16 DIAGNOSIS — E66.01 SEVERE OBESITY WITH BODY MASS INDEX (BMI) OF 35.0 TO 39.9 WITH SERIOUS COMORBIDITY (H): ICD-10-CM

## 2022-03-16 DIAGNOSIS — O09.93 HIGH-RISK PREGNANCY IN THIRD TRIMESTER: ICD-10-CM

## 2022-03-16 DIAGNOSIS — O09.90 HIGH-RISK PREGNANCY SUPERVISION, UNSPECIFIED TRIMESTER: Primary | ICD-10-CM

## 2022-03-16 PROCEDURE — 76819 FETAL BIOPHYS PROFIL W/O NST: CPT

## 2022-03-16 PROCEDURE — 99207 PR PRENATAL VISIT: CPT | Performed by: FAMILY MEDICINE

## 2022-03-16 ASSESSMENT — PAIN SCALES - GENERAL: PAINLEVEL: NO PAIN (0)

## 2022-03-16 NOTE — PROGRESS NOTES
Concerns:    Doing well.  No concerns today.  No vaginal bleeding, LOF, contractions.  No HA, RUQ pain, N/V, visual changes.  Labor precautions discussed.  RTC 1 week.    Lance Tang MD  Pregnancy Risks:   1. Asthma. Controlled on symbicort currently  2. Depression/anxiety. Controlled on effextor  3. Obesity. Pre-gravid BMI> 40. Weekly BPP at 36 wks     Prior uneventful , 90min of pushing     Prenatal Labs:             Lab Results   Component Value Date     ABO O 2018     RH Pos 2018     AS Negative 2021     HEPBANG Nonreactive 2021     CHPCRT Negative 2021     GCPCRT Negative 2021     TREPAB Negative 2018     HGB 13.3 2021     HIV Negative 2012                   Lab Results   Component Value Date     PAP NIL 2020         Estimated Date of Delivery: 2022      3rd Trimester  GBS: _x_  Glucola:  _x__  Imms: DtaP _x_, Flu_x_, COVID _2021_

## 2022-03-22 ENCOUNTER — NURSE TRIAGE (OUTPATIENT)
Dept: FAMILY MEDICINE | Facility: CLINIC | Age: 27
End: 2022-03-22
Payer: COMMERCIAL

## 2022-03-22 ENCOUNTER — ANESTHESIA (OUTPATIENT)
Dept: OBGYN | Facility: CLINIC | Age: 27
End: 2022-03-22
Payer: COMMERCIAL

## 2022-03-22 ENCOUNTER — ANESTHESIA EVENT (OUTPATIENT)
Dept: OBGYN | Facility: CLINIC | Age: 27
End: 2022-03-22
Payer: COMMERCIAL

## 2022-03-22 ENCOUNTER — E-VISIT (OUTPATIENT)
Dept: FAMILY MEDICINE | Facility: CLINIC | Age: 27
End: 2022-03-22

## 2022-03-22 ENCOUNTER — HOSPITAL ENCOUNTER (INPATIENT)
Facility: CLINIC | Age: 27
LOS: 2 days | Discharge: HOME | End: 2022-03-24
Attending: FAMILY MEDICINE | Admitting: FAMILY MEDICINE
Payer: COMMERCIAL

## 2022-03-22 DIAGNOSIS — O09.93 HIGH-RISK PREGNANCY IN THIRD TRIMESTER: Primary | ICD-10-CM

## 2022-03-22 DIAGNOSIS — Z53.9 ERRONEOUS ENCOUNTER--DISREGARD: Primary | ICD-10-CM

## 2022-03-22 DIAGNOSIS — O14.13 PRE-ECLAMPSIA, SEVERE, ANTEPARTUM, THIRD TRIMESTER: ICD-10-CM

## 2022-03-22 PROBLEM — O09.90 HIGH-RISK PREGNANCY: Status: ACTIVE | Noted: 2021-10-06

## 2022-03-22 LAB
ABO/RH(D): NORMAL
ALT SERPL W P-5'-P-CCNC: 12 U/L (ref 0–50)
ANION GAP SERPL CALCULATED.3IONS-SCNC: 7 MMOL/L (ref 3–14)
ANTIBODY SCREEN: NEGATIVE
AST SERPL W P-5'-P-CCNC: 15 U/L (ref 0–45)
BUN SERPL-MCNC: 6 MG/DL (ref 7–30)
CALCIUM SERPL-MCNC: 8.3 MG/DL (ref 8.5–10.1)
CHLORIDE BLD-SCNC: 110 MMOL/L (ref 94–109)
CO2 SERPL-SCNC: 20 MMOL/L (ref 20–32)
CREAT SERPL-MCNC: 0.5 MG/DL (ref 0.52–1.04)
CREAT UR-MCNC: 10 MG/DL
ERYTHROCYTE [DISTWIDTH] IN BLOOD BY AUTOMATED COUNT: 13.2 % (ref 10–15)
GFR SERPL CREATININE-BSD FRML MDRD: >90 ML/MIN/1.73M2
GLUCOSE BLD-MCNC: 105 MG/DL (ref 70–99)
HCT VFR BLD AUTO: 33.9 % (ref 35–47)
HGB BLD-MCNC: 11.1 G/DL (ref 11.7–15.7)
MCH RBC QN AUTO: 25.6 PG (ref 26.5–33)
MCHC RBC AUTO-ENTMCNC: 32.7 G/DL (ref 31.5–36.5)
MCV RBC AUTO: 78 FL (ref 78–100)
PLATELET # BLD AUTO: 213 10E3/UL (ref 150–450)
POTASSIUM BLD-SCNC: 3.9 MMOL/L (ref 3.4–5.3)
PROT UR-MCNC: 0.16 G/L
PROT/CREAT 24H UR: 1.6 G/G CR (ref 0–0.2)
RBC # BLD AUTO: 4.34 10E6/UL (ref 3.8–5.2)
SARS-COV-2 RNA RESP QL NAA+PROBE: NEGATIVE
SODIUM SERPL-SCNC: 137 MMOL/L (ref 133–144)
SPECIMEN EXPIRATION DATE: NORMAL
WBC # BLD AUTO: 6.9 10E3/UL (ref 4–11)

## 2022-03-22 PROCEDURE — 80048 BASIC METABOLIC PNL TOTAL CA: CPT | Performed by: FAMILY MEDICINE

## 2022-03-22 PROCEDURE — 84156 ASSAY OF PROTEIN URINE: CPT | Performed by: FAMILY MEDICINE

## 2022-03-22 PROCEDURE — 85027 COMPLETE CBC AUTOMATED: CPT | Performed by: FAMILY MEDICINE

## 2022-03-22 PROCEDURE — 84460 ALANINE AMINO (ALT) (SGPT): CPT | Performed by: FAMILY MEDICINE

## 2022-03-22 PROCEDURE — 250N000009 HC RX 250: Performed by: NURSE ANESTHETIST, CERTIFIED REGISTERED

## 2022-03-22 PROCEDURE — 250N000011 HC RX IP 250 OP 636: Performed by: NURSE ANESTHETIST, CERTIFIED REGISTERED

## 2022-03-22 PROCEDURE — 722N000001 HC LABOR CARE VAGINAL DELIVERY SINGLE

## 2022-03-22 PROCEDURE — 120N000001 HC R&B MED SURG/OB

## 2022-03-22 PROCEDURE — 84450 TRANSFERASE (AST) (SGOT): CPT | Performed by: FAMILY MEDICINE

## 2022-03-22 PROCEDURE — 258N000003 HC RX IP 258 OP 636: Performed by: FAMILY MEDICINE

## 2022-03-22 PROCEDURE — 83735 ASSAY OF MAGNESIUM: CPT | Performed by: FAMILY MEDICINE

## 2022-03-22 PROCEDURE — 00HU33Z INSERTION OF INFUSION DEVICE INTO SPINAL CANAL, PERCUTANEOUS APPROACH: ICD-10-PCS | Performed by: FAMILY MEDICINE

## 2022-03-22 PROCEDURE — 59400 OBSTETRICAL CARE: CPT | Performed by: FAMILY MEDICINE

## 2022-03-22 PROCEDURE — 86850 RBC ANTIBODY SCREEN: CPT | Performed by: FAMILY MEDICINE

## 2022-03-22 PROCEDURE — 250N000011 HC RX IP 250 OP 636: Performed by: FAMILY MEDICINE

## 2022-03-22 PROCEDURE — 370N000003 HC ANESTHESIA WARD SERVICE

## 2022-03-22 PROCEDURE — 36415 COLL VENOUS BLD VENIPUNCTURE: CPT | Performed by: FAMILY MEDICINE

## 2022-03-22 PROCEDURE — 3E0R3BZ INTRODUCTION OF ANESTHETIC AGENT INTO SPINAL CANAL, PERCUTANEOUS APPROACH: ICD-10-PCS | Performed by: FAMILY MEDICINE

## 2022-03-22 PROCEDURE — 250N000009 HC RX 250: Performed by: FAMILY MEDICINE

## 2022-03-22 PROCEDURE — 86780 TREPONEMA PALLIDUM: CPT | Performed by: FAMILY MEDICINE

## 2022-03-22 PROCEDURE — 258N000003 HC RX IP 258 OP 636: Performed by: NURSE ANESTHETIST, CERTIFIED REGISTERED

## 2022-03-22 PROCEDURE — 10907ZC DRAINAGE OF AMNIOTIC FLUID, THERAPEUTIC FROM PRODUCTS OF CONCEPTION, VIA NATURAL OR ARTIFICIAL OPENING: ICD-10-PCS | Performed by: FAMILY MEDICINE

## 2022-03-22 PROCEDURE — 87635 SARS-COV-2 COVID-19 AMP PRB: CPT | Performed by: FAMILY MEDICINE

## 2022-03-22 RX ORDER — SODIUM CHLORIDE, SODIUM LACTATE, POTASSIUM CHLORIDE, CALCIUM CHLORIDE 600; 310; 30; 20 MG/100ML; MG/100ML; MG/100ML; MG/100ML
10-125 INJECTION, SOLUTION INTRAVENOUS CONTINUOUS
Status: DISCONTINUED | OUTPATIENT
Start: 2022-03-22 | End: 2022-03-24 | Stop reason: HOSPADM

## 2022-03-22 RX ORDER — MAGNESIUM SULFATE HEPTAHYDRATE 500 MG/ML
10 INJECTION, SOLUTION INTRAMUSCULAR; INTRAVENOUS
Status: DISCONTINUED | OUTPATIENT
Start: 2022-03-22 | End: 2022-03-24 | Stop reason: HOSPADM

## 2022-03-22 RX ORDER — LABETALOL HYDROCHLORIDE 5 MG/ML
20-80 INJECTION, SOLUTION INTRAVENOUS EVERY 10 MIN PRN
Status: DISCONTINUED | OUTPATIENT
Start: 2022-03-22 | End: 2022-03-22

## 2022-03-22 RX ORDER — MODIFIED LANOLIN
OINTMENT (GRAM) TOPICAL
Status: DISCONTINUED | OUTPATIENT
Start: 2022-03-22 | End: 2022-03-24 | Stop reason: HOSPADM

## 2022-03-22 RX ORDER — BISACODYL 10 MG
10 SUPPOSITORY, RECTAL RECTAL DAILY PRN
Status: DISCONTINUED | OUTPATIENT
Start: 2022-03-22 | End: 2022-03-24 | Stop reason: HOSPADM

## 2022-03-22 RX ORDER — PRENATAL VIT/IRON FUM/FOLIC AC 27MG-0.8MG
1 TABLET ORAL DAILY
Status: DISCONTINUED | OUTPATIENT
Start: 2022-03-22 | End: 2022-03-24 | Stop reason: HOSPADM

## 2022-03-22 RX ORDER — IBUPROFEN 800 MG/1
800 TABLET, FILM COATED ORAL
Status: DISCONTINUED | OUTPATIENT
Start: 2022-03-22 | End: 2022-03-22

## 2022-03-22 RX ORDER — MISOPROSTOL 200 UG/1
400 TABLET ORAL
Status: DISCONTINUED | OUTPATIENT
Start: 2022-03-22 | End: 2022-03-22

## 2022-03-22 RX ORDER — PROCHLORPERAZINE 25 MG
25 SUPPOSITORY, RECTAL RECTAL EVERY 12 HOURS PRN
Status: DISCONTINUED | OUTPATIENT
Start: 2022-03-22 | End: 2022-03-22

## 2022-03-22 RX ORDER — ACETAMINOPHEN 325 MG/1
650 TABLET ORAL EVERY 4 HOURS PRN
Status: DISCONTINUED | OUTPATIENT
Start: 2022-03-22 | End: 2022-03-22

## 2022-03-22 RX ORDER — CITRIC ACID/SODIUM CITRATE 334-500MG
30 SOLUTION, ORAL ORAL
Status: DISCONTINUED | OUTPATIENT
Start: 2022-03-22 | End: 2022-03-22

## 2022-03-22 RX ORDER — ACETAMINOPHEN 325 MG/1
650 TABLET ORAL EVERY 4 HOURS PRN
Status: DISCONTINUED | OUTPATIENT
Start: 2022-03-22 | End: 2022-03-24 | Stop reason: HOSPADM

## 2022-03-22 RX ORDER — FENTANYL CITRATE 50 UG/ML
100 INJECTION, SOLUTION INTRAMUSCULAR; INTRAVENOUS
Status: DISCONTINUED | OUTPATIENT
Start: 2022-03-22 | End: 2022-03-22

## 2022-03-22 RX ORDER — MAGNESIUM SULFATE HEPTAHYDRATE 40 MG/ML
4 INJECTION, SOLUTION INTRAVENOUS
Status: DISCONTINUED | OUTPATIENT
Start: 2022-03-22 | End: 2022-03-24 | Stop reason: HOSPADM

## 2022-03-22 RX ORDER — METOCLOPRAMIDE 5 MG/1
10 TABLET ORAL EVERY 6 HOURS PRN
Status: DISCONTINUED | OUTPATIENT
Start: 2022-03-22 | End: 2022-03-22

## 2022-03-22 RX ORDER — SODIUM CHLORIDE, SODIUM LACTATE, POTASSIUM CHLORIDE, CALCIUM CHLORIDE 600; 310; 30; 20 MG/100ML; MG/100ML; MG/100ML; MG/100ML
INJECTION, SOLUTION INTRAVENOUS CONTINUOUS PRN
Status: DISCONTINUED | OUTPATIENT
Start: 2022-03-22 | End: 2022-03-22

## 2022-03-22 RX ORDER — LIDOCAINE 40 MG/G
CREAM TOPICAL
Status: DISCONTINUED | OUTPATIENT
Start: 2022-03-22 | End: 2022-03-22

## 2022-03-22 RX ORDER — NALOXONE HYDROCHLORIDE 0.4 MG/ML
0.4 INJECTION, SOLUTION INTRAMUSCULAR; INTRAVENOUS; SUBCUTANEOUS
Status: DISCONTINUED | OUTPATIENT
Start: 2022-03-22 | End: 2022-03-24 | Stop reason: HOSPADM

## 2022-03-22 RX ORDER — CARBOPROST TROMETHAMINE 250 UG/ML
250 INJECTION, SOLUTION INTRAMUSCULAR
Status: DISCONTINUED | OUTPATIENT
Start: 2022-03-22 | End: 2022-03-22

## 2022-03-22 RX ORDER — KETOROLAC TROMETHAMINE 30 MG/ML
30 INJECTION, SOLUTION INTRAMUSCULAR; INTRAVENOUS
Status: DISCONTINUED | OUTPATIENT
Start: 2022-03-22 | End: 2022-03-22

## 2022-03-22 RX ORDER — NALOXONE HYDROCHLORIDE 0.4 MG/ML
0.2 INJECTION, SOLUTION INTRAMUSCULAR; INTRAVENOUS; SUBCUTANEOUS
Status: DISCONTINUED | OUTPATIENT
Start: 2022-03-22 | End: 2022-03-24 | Stop reason: HOSPADM

## 2022-03-22 RX ORDER — OXYTOCIN 10 [USP'U]/ML
10 INJECTION, SOLUTION INTRAMUSCULAR; INTRAVENOUS
Status: DISCONTINUED | OUTPATIENT
Start: 2022-03-22 | End: 2022-03-24 | Stop reason: HOSPADM

## 2022-03-22 RX ORDER — OMEPRAZOLE 10 MG/1
10 CAPSULE, DELAYED RELEASE ORAL DAILY
Status: ON HOLD | COMMUNITY
End: 2022-03-24

## 2022-03-22 RX ORDER — NALOXONE HYDROCHLORIDE 0.4 MG/ML
0.2 INJECTION, SOLUTION INTRAMUSCULAR; INTRAVENOUS; SUBCUTANEOUS
Status: DISCONTINUED | OUTPATIENT
Start: 2022-03-22 | End: 2022-03-22

## 2022-03-22 RX ORDER — NALOXONE HYDROCHLORIDE 0.4 MG/ML
0.4 INJECTION, SOLUTION INTRAMUSCULAR; INTRAVENOUS; SUBCUTANEOUS
Status: DISCONTINUED | OUTPATIENT
Start: 2022-03-22 | End: 2022-03-22

## 2022-03-22 RX ORDER — SODIUM CHLORIDE, SODIUM LACTATE, POTASSIUM CHLORIDE, CALCIUM CHLORIDE 600; 310; 30; 20 MG/100ML; MG/100ML; MG/100ML; MG/100ML
10-125 INJECTION, SOLUTION INTRAVENOUS CONTINUOUS
Status: DISCONTINUED | OUTPATIENT
Start: 2022-03-22 | End: 2022-03-22

## 2022-03-22 RX ORDER — METHYLERGONOVINE MALEATE 0.2 MG/ML
200 INJECTION INTRAVENOUS
Status: DISCONTINUED | OUTPATIENT
Start: 2022-03-22 | End: 2022-03-22

## 2022-03-22 RX ORDER — PANTOPRAZOLE SODIUM 20 MG/1
20 TABLET, DELAYED RELEASE ORAL DAILY
Status: DISCONTINUED | OUTPATIENT
Start: 2022-03-23 | End: 2022-03-24 | Stop reason: HOSPADM

## 2022-03-22 RX ORDER — LIDOCAINE HYDROCHLORIDE AND EPINEPHRINE 15; 5 MG/ML; UG/ML
INJECTION, SOLUTION EPIDURAL PRN
Status: DISCONTINUED | OUTPATIENT
Start: 2022-03-22 | End: 2022-03-22

## 2022-03-22 RX ORDER — BUPIVACAINE HYDROCHLORIDE 2.5 MG/ML
INJECTION, SOLUTION INFILTRATION; PERINEURAL PRN
Status: DISCONTINUED | OUTPATIENT
Start: 2022-03-22 | End: 2022-03-22

## 2022-03-22 RX ORDER — HYDROCORTISONE 2.5 %
CREAM (GRAM) TOPICAL 3 TIMES DAILY PRN
Status: DISCONTINUED | OUTPATIENT
Start: 2022-03-22 | End: 2022-03-24 | Stop reason: HOSPADM

## 2022-03-22 RX ORDER — EPHEDRINE SULFATE 50 MG/ML
5 INJECTION, SOLUTION INTRAMUSCULAR; INTRAVENOUS; SUBCUTANEOUS
Status: DISCONTINUED | OUTPATIENT
Start: 2022-03-22 | End: 2022-03-24 | Stop reason: HOSPADM

## 2022-03-22 RX ORDER — TRANEXAMIC ACID 10 MG/ML
1 INJECTION, SOLUTION INTRAVENOUS EVERY 30 MIN PRN
Status: DISCONTINUED | OUTPATIENT
Start: 2022-03-22 | End: 2022-03-24 | Stop reason: HOSPADM

## 2022-03-22 RX ORDER — PROCHLORPERAZINE MALEATE 5 MG
10 TABLET ORAL EVERY 6 HOURS PRN
Status: DISCONTINUED | OUTPATIENT
Start: 2022-03-22 | End: 2022-03-22

## 2022-03-22 RX ORDER — OXYCODONE HYDROCHLORIDE 5 MG/1
5 TABLET ORAL EVERY 4 HOURS PRN
Status: DISCONTINUED | OUTPATIENT
Start: 2022-03-22 | End: 2022-03-24 | Stop reason: HOSPADM

## 2022-03-22 RX ORDER — VENLAFAXINE HYDROCHLORIDE 37.5 MG/1
37.5 CAPSULE, EXTENDED RELEASE ORAL DAILY
Status: DISCONTINUED | OUTPATIENT
Start: 2022-03-23 | End: 2022-03-24 | Stop reason: HOSPADM

## 2022-03-22 RX ORDER — OXYTOCIN 10 [USP'U]/ML
10 INJECTION, SOLUTION INTRAMUSCULAR; INTRAVENOUS
Status: DISCONTINUED | OUTPATIENT
Start: 2022-03-22 | End: 2022-03-22

## 2022-03-22 RX ORDER — DOCUSATE SODIUM 100 MG/1
100 CAPSULE, LIQUID FILLED ORAL DAILY
Status: DISCONTINUED | OUTPATIENT
Start: 2022-03-23 | End: 2022-03-24 | Stop reason: HOSPADM

## 2022-03-22 RX ORDER — CARBOPROST TROMETHAMINE 250 UG/ML
250 INJECTION, SOLUTION INTRAMUSCULAR
Status: DISCONTINUED | OUTPATIENT
Start: 2022-03-22 | End: 2022-03-24 | Stop reason: HOSPADM

## 2022-03-22 RX ORDER — METHYLERGONOVINE MALEATE 0.2 MG/ML
200 INJECTION INTRAVENOUS
Status: DISCONTINUED | OUTPATIENT
Start: 2022-03-22 | End: 2022-03-24 | Stop reason: HOSPADM

## 2022-03-22 RX ORDER — NIFEDIPINE 10 MG/1
10-20 CAPSULE ORAL
Status: DISCONTINUED | OUTPATIENT
Start: 2022-03-22 | End: 2022-03-24 | Stop reason: HOSPADM

## 2022-03-22 RX ORDER — CALCIUM GLUCONATE 94 MG/ML
1 INJECTION, SOLUTION INTRAVENOUS
Status: DISCONTINUED | OUTPATIENT
Start: 2022-03-22 | End: 2022-03-24 | Stop reason: HOSPADM

## 2022-03-22 RX ORDER — ONDANSETRON 4 MG/1
4 TABLET, ORALLY DISINTEGRATING ORAL EVERY 6 HOURS PRN
Status: DISCONTINUED | OUTPATIENT
Start: 2022-03-22 | End: 2022-03-22

## 2022-03-22 RX ORDER — MAGNESIUM SULFATE HEPTAHYDRATE 40 MG/ML
4 INJECTION, SOLUTION INTRAVENOUS ONCE
Status: COMPLETED | OUTPATIENT
Start: 2022-03-22 | End: 2022-03-22

## 2022-03-22 RX ORDER — TRANEXAMIC ACID 10 MG/ML
1 INJECTION, SOLUTION INTRAVENOUS EVERY 30 MIN PRN
Status: DISCONTINUED | OUTPATIENT
Start: 2022-03-22 | End: 2022-03-22

## 2022-03-22 RX ORDER — NALBUPHINE HYDROCHLORIDE 10 MG/ML
2.5-5 INJECTION, SOLUTION INTRAMUSCULAR; INTRAVENOUS; SUBCUTANEOUS EVERY 6 HOURS PRN
Status: DISCONTINUED | OUTPATIENT
Start: 2022-03-22 | End: 2022-03-22

## 2022-03-22 RX ORDER — MAGNESIUM SULFATE HEPTAHYDRATE 40 MG/ML
2 INJECTION, SOLUTION INTRAVENOUS
Status: DISCONTINUED | OUTPATIENT
Start: 2022-03-22 | End: 2022-03-24 | Stop reason: HOSPADM

## 2022-03-22 RX ORDER — OXYTOCIN/0.9 % SODIUM CHLORIDE 30/500 ML
340 PLASTIC BAG, INJECTION (ML) INTRAVENOUS CONTINUOUS PRN
Status: DISCONTINUED | OUTPATIENT
Start: 2022-03-22 | End: 2022-03-24 | Stop reason: HOSPADM

## 2022-03-22 RX ORDER — CALCIUM GLUCONATE 94 MG/ML
1 INJECTION, SOLUTION INTRAVENOUS
Status: DISCONTINUED | OUTPATIENT
Start: 2022-03-22 | End: 2022-03-22

## 2022-03-22 RX ORDER — OXYTOCIN/0.9 % SODIUM CHLORIDE 30/500 ML
100-340 PLASTIC BAG, INJECTION (ML) INTRAVENOUS CONTINUOUS PRN
Status: DISCONTINUED | OUTPATIENT
Start: 2022-03-22 | End: 2022-03-22

## 2022-03-22 RX ORDER — OXYTOCIN/0.9 % SODIUM CHLORIDE 30/500 ML
1-24 PLASTIC BAG, INJECTION (ML) INTRAVENOUS CONTINUOUS
Status: DISCONTINUED | OUTPATIENT
Start: 2022-03-22 | End: 2022-03-22

## 2022-03-22 RX ORDER — METOCLOPRAMIDE HYDROCHLORIDE 5 MG/ML
10 INJECTION INTRAMUSCULAR; INTRAVENOUS EVERY 6 HOURS PRN
Status: DISCONTINUED | OUTPATIENT
Start: 2022-03-22 | End: 2022-03-22

## 2022-03-22 RX ORDER — MAGNESIUM SULFATE IN WATER 40 MG/ML
2 INJECTION, SOLUTION INTRAVENOUS CONTINUOUS
Status: DISCONTINUED | OUTPATIENT
Start: 2022-03-22 | End: 2022-03-23

## 2022-03-22 RX ORDER — HYDRALAZINE HYDROCHLORIDE 20 MG/ML
10 INJECTION INTRAMUSCULAR; INTRAVENOUS
Status: DISCONTINUED | OUTPATIENT
Start: 2022-03-22 | End: 2022-03-24 | Stop reason: HOSPADM

## 2022-03-22 RX ORDER — MAGNESIUM SULFATE IN WATER 40 MG/ML
2 INJECTION, SOLUTION INTRAVENOUS CONTINUOUS
Status: DISCONTINUED | OUTPATIENT
Start: 2022-03-22 | End: 2022-03-22

## 2022-03-22 RX ORDER — LORAZEPAM 2 MG/ML
2 INJECTION INTRAMUSCULAR
Status: DISCONTINUED | OUTPATIENT
Start: 2022-03-22 | End: 2022-03-24 | Stop reason: HOSPADM

## 2022-03-22 RX ORDER — MISOPROSTOL 200 UG/1
400 TABLET ORAL
Status: DISCONTINUED | OUTPATIENT
Start: 2022-03-22 | End: 2022-03-24 | Stop reason: HOSPADM

## 2022-03-22 RX ORDER — ONDANSETRON 2 MG/ML
4 INJECTION INTRAMUSCULAR; INTRAVENOUS EVERY 6 HOURS PRN
Status: DISCONTINUED | OUTPATIENT
Start: 2022-03-22 | End: 2022-03-22

## 2022-03-22 RX ORDER — OXYTOCIN/0.9 % SODIUM CHLORIDE 30/500 ML
340 PLASTIC BAG, INJECTION (ML) INTRAVENOUS CONTINUOUS PRN
Status: DISCONTINUED | OUTPATIENT
Start: 2022-03-22 | End: 2022-03-22

## 2022-03-22 RX ORDER — LORAZEPAM 2 MG/ML
2 INJECTION INTRAMUSCULAR
Status: DISCONTINUED | OUTPATIENT
Start: 2022-03-22 | End: 2022-03-22

## 2022-03-22 RX ORDER — LABETALOL HYDROCHLORIDE 5 MG/ML
20 INJECTION, SOLUTION INTRAVENOUS
Status: DISCONTINUED | OUTPATIENT
Start: 2022-03-22 | End: 2022-03-24 | Stop reason: HOSPADM

## 2022-03-22 RX ADMIN — LIDOCAINE HYDROCHLORIDE,EPINEPHRINE BITARTRATE 3.5 ML: 15; .005 INJECTION, SOLUTION EPIDURAL; INFILTRATION; INTRACAUDAL; PERINEURAL at 19:50

## 2022-03-22 RX ADMIN — HYDRALAZINE HYDROCHLORIDE 10 MG: 20 INJECTION INTRAMUSCULAR; INTRAVENOUS at 19:17

## 2022-03-22 RX ADMIN — SODIUM CHLORIDE, POTASSIUM CHLORIDE, SODIUM LACTATE AND CALCIUM CHLORIDE 75 ML/HR: 600; 310; 30; 20 INJECTION, SOLUTION INTRAVENOUS at 14:57

## 2022-03-22 RX ADMIN — BUPIVACAINE HYDROCHLORIDE: 5 INJECTION, SOLUTION EPIDURAL; INTRACAUDAL; PERINEURAL at 19:59

## 2022-03-22 RX ADMIN — MAGNESIUM SULFATE HEPTAHYDRATE 2 G/HR: 40 INJECTION, SOLUTION INTRAVENOUS at 16:07

## 2022-03-22 RX ADMIN — Medication 2 MILLI-UNITS/MIN: at 16:32

## 2022-03-22 RX ADMIN — BUPIVACAINE HYDROCHLORIDE 8 ML: 2.5 INJECTION, SOLUTION INFILTRATION; PERINEURAL at 19:55

## 2022-03-22 RX ADMIN — FENTANYL CITRATE 100 MCG: 50 INJECTION INTRAMUSCULAR; INTRAVENOUS at 19:32

## 2022-03-22 RX ADMIN — MAGNESIUM SULFATE HEPTAHYDRATE 4 G: 40 INJECTION, SOLUTION INTRAVENOUS at 14:58

## 2022-03-22 RX ADMIN — ONDANSETRON 4 MG: 2 INJECTION INTRAMUSCULAR; INTRAVENOUS at 20:18

## 2022-03-22 RX ADMIN — LABETALOL HYDROCHLORIDE 20 MG: 5 INJECTION, SOLUTION INTRAVENOUS at 14:29

## 2022-03-22 NOTE — PROGRESS NOTES
1400- Radha updated to patient condition and arrival. /84 and 164/87. VO to initiate hypertension order set and admit patient. Labs and urine obtained. He would like mag started and will be up to assess patient.

## 2022-03-22 NOTE — PROGRESS NOTES
S: Triage Arrival  B: Kena is a 27 y.o.  @ 38w 3d who presents with complaint of high blood pressure and feeling dizzy  A: EFM applied. FHT's140 with moderate variability, accels present, no decels noted on strip. Contractions x1.   R: Will notify MD to obtain further orders.

## 2022-03-22 NOTE — TELEPHONE ENCOUNTER
"Pt advised to go right to L&D  She is in agreement with this plan  L&D notified     Reason for Disposition    Pregnant > 20 weeks or postpartum (< 6 weeks after delivery) and new hand or face swelling    Additional Information    Negative: Sounds like a life-threatening emergency to the triager    Answer Assessment - Initial Assessment Questions  1. BLOOD PRESSURE: \"What is the blood pressure?\" \"Did you take at least two measurements 5 minutes apart?\"      1235 160/107       1245 156/101  2. ONSET: \"When did you take your blood pressure?\"      Bp elevated started this am   3. HOW: \"How did you obtain the blood pressure?\" (e.g., visiting nurse, automatic home BP monitor)      Home Bp monitor   4. HISTORY: \"Do you have a history of high blood pressure?\"      Pt is 38 weeks pregnant   5. MEDICATIONS: \"Are you taking any medications for blood pressure?\" \"Have you missed any doses recently?\"      DENIES   6. OTHER SYMPTOMS: \"Do you have any symptoms?\" (e.g., headache, chest pain, blurred vision, difficulty breathing, weakness)      Pt experiencing dizziness intermittently with this elevated Bp   7. PREGNANCY: \"Is there any chance you are pregnant?\" \"When was your last menstrual period?\"      Yes    Protocols used: HIGH BLOOD PRESSURE-A-OH    Lana Royal, RN, BSN    "

## 2022-03-22 NOTE — PROGRESS NOTES
S:  Admission  B:  Kena is a  @ 38w3d gestation, GBS -, Hep. B -, pregnancy complicated by preeclampsia. EFW 8 per MD.,  A:  Patient admitted to room 334 for inducation of labor due to pre-eclampsia. 2+ edema to BLE, no clonus, normal reflexes. Pt feeling better after dose of medication. BP improved. Magnesium started. Pt feeling flushed and hot from that but over all okay. SVE per Radha 2cm, 70%. AROM completed at 1533, moderate clear fluids.   R: Will plan to start pitocin.

## 2022-03-22 NOTE — PROGRESS NOTES
Pitocin started at 1632. FHTs reassuring. Clear fluids continues to leak from rupture. Patient feeling slightly more uncomfortable but denies need for pain medications. Radha assessed patient and will be following.

## 2022-03-22 NOTE — PROGRESS NOTES
Blood pressure remained to be in the 140s/80s.  Received 1 dose of labetalol total.  No headache or dizziness.  No abdominal pain.    NST is reactive with category 1.  Contractions every 2-3 minutes.  Feeling more pressure and pain with the contraction.    Will continue with the Mag infusion and Pitocin induction.  Recommend to recheck the cervix in an hour, if no cervical change, will consider IUPC to monitor for adequacy contraction adequacy and adjust the Pitocin appropriately.    Anticipate spontaneous vaginal delivery.    Assessments and plan discussed, all of her questions were answered.    Rafael Garcia MD.

## 2022-03-22 NOTE — H&P
[facility] Labor and Delivery History and Physical    Kena Chan MRN# [id number]   Age: 27 year old YOB: 1995     Date of Admission:  3/22/2022    Primary care provider: Lance Tang           Chief Complaint:   Kena Chan is a 27 year old female who is 38w3d pregnant and being admitted for induction of labor, indication pre-eclampsia with severe features.    Kena  is a 27 year old female,  who is 38w3d pregnant and being admitted for medical induction due to pre-eclampsia with severe feature.  Stated that she was suddenly feeling very dizzy that lasted for 2-3 minutes, checked her blood pressure and it was 160s/100s.  No history of high blood pressure or pre-eclampsia.  BP at presentation was 166/84 and 164/87 (15 minues a part).  She was given a dose of Labetalol and BP has been stable in the range of 140s-150s/80s-90s.  Not feeling contraction nor increased pelvic pelvic.  Normal fetal movement. No abnormal discharge or bleeding.  No leakage or UTI symptoms.  No headache or acute change in her vision.  No runny nose nose, nasal congestion, coughing, fever or chill.  No N/V/D/C.      Cervical check: 2/60/-3.  Morin score of 6.  NST was reactive with rare contraction.    High risk pregnancy due to obesity     No complication with the pregnancy with good prenatal care. EZRA was based accurate LMP with consistent EZRA based on early ultrasound. GBS was negative. Her one hour glucose test was normal.  Blood type is O+.  Immunized to Rubella.  UTD for her immunization.          Pregnancy history:     OBSTETRIC HISTORY:    OB History    Para Term  AB Living   2 1 1 0 0 1   SAB IAB Ectopic Multiple Live Births   0 0 0 0 1      # Outcome Date GA Lbr Nik/2nd Weight Sex Delivery Anes PTL Lv   2 Current            1 Term 18 38w5d 04:40 / 03:51 3.53 kg (7 lb 12.5 oz) F Vag-Spont  N ALIS      Complications: GBS (group B Streptococcus carrier), +RV  culture, currently pregnant      Name: Dinah      Apgar1: 8  Apgar5: 9      Obstetric Comments   EDC 4/1/2022 based on LMP.  Parenting with Vaughn       EDC: Estimated Date of Delivery: 4/2/22    Prenatal Labs:   Lab Results   Component Value Date    ABO O 06/29/2018    RH Pos 06/29/2018    AS Negative 03/22/2022    HEPBANG Nonreactive 09/01/2021    CHPCRT Negative 09/01/2021    GCPCRT Negative 09/01/2021    TREPAB Negative 04/16/2018    HGB 11.1 (L) 03/22/2022    HIV Negative 11/20/2012       GBS Status:   Lab Results   Component Value Date    GBS Negative 06/13/2018       Active Problem List  Patient Active Problem List   Diagnosis     Attention deficit disorder     Generalized anxiety disorder     Chronic adenoiditis     Severe obesity (BMI 35.0-39.9)     Major depressive disorder, recurrent episode, mild (H)     Mild persistent asthma without complication     Seasonal allergic rhinitis     History of postpartum depression     High-risk pregnancy     Labor and delivery, indication for care     Pre-eclampsia, severe, antepartum, third trimester       Medication Prior to Admission  Medications Prior to Admission   Medication Sig Dispense Refill Last Dose     albuterol (PROAIR HFA/PROVENTIL HFA/VENTOLIN HFA) 108 (90 Base) MCG/ACT inhaler Inhale 2 puffs into the lungs every 6 hours as needed for shortness of breath / dyspnea or wheezing 2 Inhaler 0 More than a month at Unknown time     budesonide-formoterol (SYMBICORT) 160-4.5 MCG/ACT Inhaler INHALE 2 PUFFS INTO THE LUNGS TWICE DAILY 30.6 g 3 3/22/2022 at 0800     cetirizine (ZYRTEC ALLERGY) 10 MG tablet Take 1 tablet (10 mg) by mouth daily 90 tablet 0 3/22/2022 at 0800     omeprazole (PRILOSEC) 10 MG DR capsule Take 10 mg by mouth daily   3/22/2022 at 0800     ondansetron (ZOFRAN-ODT) 4 MG ODT tab Take 1-2 tablets (4-8 mg) by mouth every 8 hours as needed for nausea 30 tablet 3 More than a month at Unknown time     Prenatal Vit-Fe Fumarate-FA (PRENATAL MULTIVITAMIN  W/IRON) 27-0.8 MG tablet Take 1 tablet by mouth daily   3/22/2022 at 0800     venlafaxine (EFFEXOR-ER) 37.5 MG 24 hr tablet Take 1 tablet (37.5 mg) by mouth daily TAKE 1 TABLET(37.5 MG) BY MOUTH DAILY 90 tablet 1 3/22/2022 at 0800     ondansetron (ZOFRAN-ODT) 4 MG ODT tab Take 1 tablet (4 mg) by mouth every 8 hours as needed for nausea (Patient not taking: Reported on 3/9/2022) 20 tablet 1    .        Maternal Past Medical History:     Past Medical History:   Diagnosis Date     Chronic adenoiditis 3/3/2010     Encounter for insertion of mirena IUD 10/2/2018    Remove by 10/2/2023     Hypertrophy of nasal turbinates 3/3/2010     Iron deficiency anemia, unspecified     TREATED WITH IRON SUPPLEMENTS     Mild intermittent asthma      Plantar fasciitis 3/15/2012     Pneumonia, organism unspecified(486)     Pneumonia     Post partum depression 7/5/2018     Seasonal allergies                        Family History:     Family History   Problem Relation Age of Onset     Gastrointestinal Disease Father      Heart Disease Father      Back Pain Father      Diabetes Mother      Hypertension Mother      Hyperlipidemia Mother      Anxiety Disorder Sister      Depression Sister      Hearing Loss Sister         congenital     Coronary Artery Disease Maternal Grandmother      Cerebrovascular Disease Maternal Grandmother      Diabetes Maternal Grandfather         Type II     Cerebrovascular Disease Maternal Grandfather      Cancer Paternal Grandmother         lung (she was a smoker)     Coronary Artery Disease Paternal Grandfather         aortic aneurysm     Food Allergy Daughter 3        peanut     Allergies Daughter      Asthma Daughter      Family history reviewed and updated in [facility]            Social History:     Social History     Tobacco Use     Smoking status: Never Smoker     Smokeless tobacco: Never Used     Tobacco comment: no smokers in the household   Substance Use Topics     Alcohol use: No            Review of Systems:    The Review of Systems is negative other than noted in the HPI          Physical Exam:     Vitals were reviewed  Patient Vitals for the past 8 hrs:   BP Temp Temp src Pulse Resp Weight   22 1700 (!) 145/76 -- -- -- -- --   22 1630 (!) 142/88 99.1  F (37.3  C) Axillary 90 16 --   22 1615 (!) 144/86 -- -- -- -- 128.2 kg (282 lb 11.2 oz)   22 1545 (!) 143/84 -- -- -- -- --   22 1515 (!) 154/93 -- -- -- -- --   22 1500 (!) 149/89 -- -- -- -- --   22 1445 (!) 147/92 -- -- -- -- --   22 1400 (!) 164/87 -- -- -- -- --   22 1343 (!) 166/84 98.3  F (36.8  C) Oral 101 16 --     Constitutional:   awake, alert, cooperative, no apparent distress, comfortable on bed     Lungs:   Clear, no wheezes or crackle     Cardiovascular:   Regular rate and rhythm no murmur     Abdomen:   Appropriate for gestational age.  No tender with palpation to the fundus.  No CVA tenderness.     Musculoskeletal:   +2 edema on lower legs bilaterally to below the knees.      Cervix:   Membranes: AROM   Dilation: 2   Effacement: 60%   Station:-3   Consistency: soft   Position: Posterior  Presentation:Cephalic  Fetal Heart Rate Tracing: reactive and reassuring, variables moderate, decelerations none, positive accelerations, tier 1 (normal)  Tocometer: external monitor                       Assessment:   Kena Evansmaged with  at 38w3d pregnant female admitted with induction of labor, indication preeclampsia with severe features.  Good prenatal care with no complication.  High risk pregnancy due to morbid obesity.  No history of high blood pressure or preeclampsia.  Her asthma is well controlled with Symbicort.  Group B strep was negative.  Normal 1 hour glucose test.        Plan:   - Admit - see IP orders  - Favorable cervix, AROM with clear fluid   -Pitocin per protocol augmentation/induction  - Pain medication: Options discussed; desires for Epidural.  -Group B strep negative  - Potential risks  associated with induction discussed.  She was informed that this is a medical indicated induction.  - Discussed about indications for episiotomy, vacuum-assisted delivery, or   - Magnesium infusion per protocol.    - Avoid labetalol for high blood pressure treatment due to asthma.   - Hydralazine or Procardia as needed for high blood pressure per protocol.  - Post partum hemorrhage risks is medium to high due to induction of labor and mag.     She is okay to be transfused if necessary.     Avoid Hemabate due to asthma and Methergine due to high blood pressure.    - Adequate pelvic on exam today - EFW around 7.5 pounds  - Anticipate   - [place] care: Okay with erythromycin ointment, vitamin K injection, and hepatitis B vaccination.  - A/P discussed with her and her mother.  All of her questions were answered.      Rafael Lemus Mai, MD

## 2022-03-23 LAB
ANION GAP SERPL CALCULATED.3IONS-SCNC: 9 MMOL/L (ref 3–14)
BUN SERPL-MCNC: 6 MG/DL (ref 7–30)
CALCIUM SERPL-MCNC: 8.1 MG/DL (ref 8.5–10.1)
CHLORIDE BLD-SCNC: 105 MMOL/L (ref 94–109)
CO2 SERPL-SCNC: 17 MMOL/L (ref 20–32)
CREAT SERPL-MCNC: 0.49 MG/DL (ref 0.52–1.04)
GFR SERPL CREATININE-BSD FRML MDRD: >90 ML/MIN/1.73M2
GLUCOSE BLD-MCNC: 114 MG/DL (ref 70–99)
HGB BLD-MCNC: 10 G/DL (ref 11.7–15.7)
MAGNESIUM SERPL-MCNC: 3.9 MG/DL (ref 1.6–2.3)
POTASSIUM BLD-SCNC: 3.9 MMOL/L (ref 3.4–5.3)
SODIUM SERPL-SCNC: 131 MMOL/L (ref 133–144)
T PALLIDUM AB SER QL: NONREACTIVE

## 2022-03-23 PROCEDURE — 250N000011 HC RX IP 250 OP 636: Performed by: FAMILY MEDICINE

## 2022-03-23 PROCEDURE — 258N000003 HC RX IP 258 OP 636: Performed by: FAMILY MEDICINE

## 2022-03-23 PROCEDURE — 36415 COLL VENOUS BLD VENIPUNCTURE: CPT | Performed by: FAMILY MEDICINE

## 2022-03-23 PROCEDURE — 85018 HEMOGLOBIN: CPT | Performed by: FAMILY MEDICINE

## 2022-03-23 PROCEDURE — 250N000013 HC RX MED GY IP 250 OP 250 PS 637: Performed by: FAMILY MEDICINE

## 2022-03-23 PROCEDURE — 120N000001 HC R&B MED SURG/OB

## 2022-03-23 RX ORDER — NIFEDIPINE 30 MG/1
30 TABLET, EXTENDED RELEASE ORAL DAILY
Status: DISCONTINUED | OUTPATIENT
Start: 2022-03-23 | End: 2022-03-24 | Stop reason: HOSPADM

## 2022-03-23 RX ORDER — IBUPROFEN 600 MG/1
600 TABLET, FILM COATED ORAL EVERY 6 HOURS PRN
Status: DISCONTINUED | OUTPATIENT
Start: 2022-03-23 | End: 2022-03-24 | Stop reason: HOSPADM

## 2022-03-23 RX ADMIN — IBUPROFEN 600 MG: 600 TABLET, FILM COATED ORAL at 15:45

## 2022-03-23 RX ADMIN — IBUPROFEN 600 MG: 600 TABLET, FILM COATED ORAL at 09:09

## 2022-03-23 RX ADMIN — VENLAFAXINE HYDROCHLORIDE 37.5 MG: 37.5 CAPSULE, EXTENDED RELEASE ORAL at 08:37

## 2022-03-23 RX ADMIN — PANTOPRAZOLE SODIUM 20 MG: 20 TABLET, DELAYED RELEASE ORAL at 08:37

## 2022-03-23 RX ADMIN — FLUTICASONE FUROATE AND VILANTEROL TRIFENATATE 1 PUFF: 200; 25 POWDER RESPIRATORY (INHALATION) at 08:36

## 2022-03-23 RX ADMIN — DOCUSATE SODIUM 100 MG: 100 CAPSULE, LIQUID FILLED ORAL at 08:31

## 2022-03-23 RX ADMIN — MAGNESIUM SULFATE HEPTAHYDRATE 2 G/HR: 40 INJECTION, SOLUTION INTRAVENOUS at 01:18

## 2022-03-23 RX ADMIN — NIFEDIPINE 30 MG: 30 TABLET, FILM COATED, EXTENDED RELEASE ORAL at 09:09

## 2022-03-23 RX ADMIN — SODIUM CHLORIDE, POTASSIUM CHLORIDE, SODIUM LACTATE AND CALCIUM CHLORIDE 75 ML/HR: 600; 310; 30; 20 INJECTION, SOLUTION INTRAVENOUS at 01:19

## 2022-03-23 RX ADMIN — Medication: at 00:15

## 2022-03-23 RX ADMIN — ACETAMINOPHEN 650 MG: 325 TABLET, FILM COATED ORAL at 00:15

## 2022-03-23 RX ADMIN — SODIUM CHLORIDE, POTASSIUM CHLORIDE, SODIUM LACTATE AND CALCIUM CHLORIDE 10 ML/HR: 600; 310; 30; 20 INJECTION, SOLUTION INTRAVENOUS at 09:08

## 2022-03-23 RX ADMIN — IBUPROFEN 600 MG: 600 TABLET, FILM COATED ORAL at 22:04

## 2022-03-23 RX ADMIN — ACETAMINOPHEN 650 MG: 325 TABLET, FILM COATED ORAL at 04:39

## 2022-03-23 RX ADMIN — ACETAMINOPHEN 650 MG: 325 TABLET, FILM COATED ORAL at 17:13

## 2022-03-23 RX ADMIN — ACETAMINOPHEN 650 MG: 325 TABLET, FILM COATED ORAL at 12:32

## 2022-03-23 RX ADMIN — MAGNESIUM SULFATE HEPTAHYDRATE 2 G/HR: 40 INJECTION, SOLUTION INTRAVENOUS at 11:00

## 2022-03-23 RX ADMIN — ACETAMINOPHEN 650 MG: 325 TABLET, FILM COATED ORAL at 08:31

## 2022-03-23 RX ADMIN — HYDRALAZINE HYDROCHLORIDE 10 MG: 20 INJECTION INTRAMUSCULAR; INTRAVENOUS at 00:03

## 2022-03-23 RX ADMIN — PRENATAL VIT W/ FE FUMARATE-FA TAB 27-0.8 MG 1 TABLET: 27-0.8 TAB at 08:32

## 2022-03-23 NOTE — PROGRESS NOTES
S: Delivery  B: induced Labor,  @ [id number] gestation, GBS negative .  A: Patient delivered Vaginal at 2136 with Dr. Garcia in attendance. Baby delivered and placed on mother's low abdomen for delayed cord clamping where baby was dried and stimulated. After cord clamped and cut, baby was placed skin to skin on mother's chest within 5 minutes following delivery . Apgars 5/7/9. Placenta was delivered @ 2140 followed by administration of oxytocin. Bonding initiated with mom and baby. Educated mother on importance of exclusive breast feeding, expected feeding readiness cues and encouraged her to observe for feeding cues. Mother informed that breast feeding assistance would be provided. See flowsheet for VS and PP checks. Labor care plan goals met.  R: Expect routine postpartum care. Anticipate first feeding within the hour.

## 2022-03-23 NOTE — PROGRESS NOTES
Patient up on Maria Luisa Steady to bathroom with assist x2. While voiding, patient had ringing in her ears, vomited and felt light headed. After emesis, patient felt better. Cool wash rags applied. Additional RN to bathroom to assist. Patient placed into wheelchair and transferred to room 355 in stable condition.     PCDs placed. Pulse ox placed. BPs set to monitor every hour. Patient instructed to not get out of bed on her own, must call for assist.     Patient feeling anxious and flushed. Requested formula to feed to baby. Encouraged her to continue to place baby to breast frequently if she is wanting to breast feed. Patient brought 3 kinds of pacifiers and has already started giving them to baby.     Patient's mom leaving. Her  remains at bedside.     Dr Garcia called for update. Mag level 3.9, Reviewed creatinine and gfr with Dr Garcia.  Order to continue Magnesium Sulfate at 2g/hr.

## 2022-03-23 NOTE — PROGRESS NOTES
S: Shift review   B:Kena is a  who delivered vaginally on 3/22/22 at   A: VSS, patient is independent with mobility to the bathroom, otherwise on bedrest while on mag.  Pain well controlled with p.o. pain meds. Handles baby with confidence. Breast feeding and formula feeding per maternal request.   R: Continue with routine PP care

## 2022-03-23 NOTE — ANESTHESIA POSTPROCEDURE EVALUATION
Patient: Kena Chan    Procedure: * No procedures listed *       Anesthesia Type:  Epidural    Note:  Disposition: Inpatient   Postop Pain Control: Uneventful            Sign Out: Well controlled pain   PONV: No   Neuro/Psych: Uneventful            Sign Out: Acceptable/Baseline neuro status   Airway/Respiratory: Uneventful            Sign Out: Acceptable/Baseline resp. status   CV/Hemodynamics: Uneventful            Sign Out: Acceptable CV status   Other NRE: NONE   DID A NON-ROUTINE EVENT OCCUR? No    Event details/Postop Comments:  Pt was happy with her anesthesia care.  No complications.  I advised the pt she may have some soreness at the epidural site and this is normal.  However if the soreness continues over a week or if redness is noted around the site to let anesthesia know.  I will follow up with the pt if needed.           Last vitals:  Vitals Value Taken Time   BP     Temp     Pulse     Resp     SpO2         Electronically Signed By: HILDA Pro CRNA  March 23, 2022  1:47 PM

## 2022-03-23 NOTE — L&D DELIVERY NOTE
VAGINAL DELIVERY NOTE:    STAGE 1:    Kena is a 27 year old female with  at 38w3d, who was admitted to the hospital on 22 at around 1400 for pre-eclampsia with severe feature.  Routine intrapartum care was initiated.  She was treated with mag infusion per protocol.  BP was controlled with one dose of Labetalol.  Fetal heart tone monitoring was continuous and was reassuring through the intrapartum period.  Vital signs were stable - BP was in 140s-150s/80s-90s after one dose of Labetalol. I/O was normal, kidney function was normal.  She is group B strep was negative.  The labor was induced and augmented with AROM and Pitocin per protocol.  Her membranes wa AROM at 1533 on 22.  She received epidural for pain withgood effect.  Cervix was completely dilated at 2110 on 22.    STAGE 2:    Kena delivered a healthy girl  at 0936 pm on 22.  Fetal position was vertex with VINOD  presentation.  Nuchal cord was once - loose and reduced easily before delivering of the shoulder.  There were no problems with delivery of the head, shoulders, or body.  After the body was delivered, the mouth was cleaned with bulb suctioned.  The  was placed on mom's tummy with nurses available to help dry and stimulate the .  The cord was clamped and then cut by FOB after 1 minute cord delay.  Apgar score at 1 minute was 5 with HR of 80s, low muscle tone, minimal respiratory effort.  The  was then transferred to the warmer under nursing attendance.  She was resuscitated for about 9 minutes and please see the resuscitation note for further details.  Apgar scores were 7 at 5 minute, 9 at 10 minutes.  Blood gases reviewed - pretty normal.  Birthweight was 8 # 4.6 Oz.  The patient has right vaginal wall laceration.    STAGE 3:    An intact placenta with 3-vessel cord delivered spontaneously at 0 on 22.  The placenta looks normal.  After the placenta was delivered, fundal massage was  performed aggressively.  Thirty units of Pitocin was added to the existing IV bag and infused at a bolus rate.  The patient had good hemostasis.  Estimated blood loss was about 400 ml.      The laceration repaired with 3-0 Vicryl suture in usual sterile manner after local infiltration of Lidocaine 1%, about 5 ml used.  All needles, gauss and instruments were counted correctly.    Kena was updated about the procedure.  Routine postpartum care was initiated.  My congratulations are to the Kena, her  and her mother, who were at the bedside during delivery.      Rafael Garcia MD.

## 2022-03-23 NOTE — PROGRESS NOTES
[facility]    Obstetrics Daily Post-Op Note    Assessment & Plan      -* No surgery found *  Active Problems:    Mild persistent asthma without complication    High-risk pregnancy    Labor and delivery, indication for care    Pre-eclampsia, severe, antepartum, third trimester       Assessment:  38w3d now  doing well, still with mild HA, is diuresing, bp's still elevated. Will start nifedipine and cont mag till symptoms resolve    Plan:  Postpartum:  - Breast feeding going well. But she's concerned about babies intake. Discussed natural process, and ok'd supplementing after every other feeding 15ml    -    Lab Results   Component Value Date    RH Pos 2018    Rhogam Not indicated  - Immunizations: No results found for: RUBELLAABIGG  S/p TDaP and flu vaccines  - PPBC: unsure    Post-Op:  - Pain: Tylenol, Motrin. Opiates    - Lowe removed, voiding  - Encourage ambulation    Dispo: 1 d        Active Problems:    Mild persistent asthma without complication    High-risk pregnancy    Labor and delivery, indication for care    Pre-eclampsia, severe, antepartum, third trimester      Lance Tang    Subjective:  Interval History   Stable.  Doing well.  Improving slowly.  Pain is reasonably controlled.  No fevers. Lochia as expected for this stage.  HA very mild, but present, did not respond to tylenol    Exam:  Physical Exam   Vitals:    22 0300 22 0400 22 0500 22 0536   BP: 122/63 126/73 (!) 148/82    BP Location:       Pulse: 95 111 105    Resp:   17    Temp:   98  F (36.7  C)    TempSrc:   Axillary    SpO2: 99% 99% 98%    Weight:    116.1 kg (256 lb)       Constitutional: healthy, alert and no distress  Abdomen:  Uterine fundus is firm, expected tenderness at the level of the umbilicus, incision:   Extremeties:  No edema, pulses intact, scd's in place        Intake/Output Summary (Last 24 hours) at 3/23/2022 0825  Last data filed at 3/23/2022  0700  Gross per 24 hour   Intake 6610.59 ml   Output 5233 ml   Net 1377.59 ml         Medications     lactated ringers 75 mL/hr (03/23/22 0119)     magnesium sulfate 2 g/hr (03/23/22 0118)     NIFEdipine (PROCARDIA) algorithm-medication instruction       - MEDICATION INSTRUCTIONS -       - MEDICATION INSTRUCTIONS -       oxytocin in 0.9% NaCl          docusate sodium  100 mg Oral Daily     fluticasone-vilanterol  1 puff Inhalation Daily     pantoprazole  20 mg Oral Daily     prenatal multivitamin w/iron  1 tablet Oral Daily     venlafaxine  37.5 mg Oral Daily       Data   Hemoglobin   Date Value Ref Range Status   03/23/2022 10.0 (L) 11.7 - 15.7 g/dL Final   03/22/2022 11.1 (L) 11.7 - 15.7 g/dL Final   07/29/2018 12.5 11.7 - 15.7 g/dL Final   06/13/2018 12.6 11.7 - 15.7 g/dL Final       Lab Results   Component Value Date    ABO O 06/29/2018           Lab Results   Component Value Date     03/22/2022     03/22/2022    GLC 81 07/29/2018    GLC 82 10/31/2001         Lance Tang MD  cell - [phone]

## 2022-03-23 NOTE — PLAN OF CARE
Goal Outcome Evaluation:    9 hours post vaginal delivery with repair, on Magnesium Sulfate for PIH. Was given hydralazine at midnight with improvement of BPs. No clonus, reflexes +2, LS clear, denies HA/change of vision. +2 dependent edema. Fundus firm at U with light to moderate bleeding. Tylenol every few hours for cramping pain. Spray and tucks for perineum. Magnesium sulfate continued at 2gm an hour with IVF to equal 125ml/hr. Voiding well. Morning weight 256lbs, improved from admit. Was wanting to breastfeed, but is very anxious that baby is not getting enough and has decided to mostly formula feed baby. Hgb being drawn this morning.

## 2022-03-23 NOTE — ANESTHESIA PREPROCEDURE EVALUATION
Anesthesia Pre-Procedure Evaluation    Patient: Kena Chan   MRN: [id number] : 1995        Procedure : * No procedures listed *          Past Medical History:   Diagnosis Date     Chronic adenoiditis 3/3/2010     Encounter for insertion of mirena IUD 10/2/2018    Remove by 10/2/2023     Hypertrophy of nasal turbinates 3/3/2010     Iron deficiency anemia, unspecified     TREATED WITH IRON SUPPLEMENTS     Mild intermittent asthma      Plantar fasciitis 3/15/2012     Pneumonia, organism unspecified(486)     Pneumonia     Post partum depression 2018     Seasonal allergies       Past Surgical History:   Procedure Laterality Date     HC THERAPUTIC FRACTURE INFER TURBINATE  03/30/10     TONSILLECTOMY & ADENOIDECTOMY  03/30/10    turbinoplasty      Allergies   Allergen Reactions     Keflex [Cephalexin Hcl] Hives     Penicillins Other (See Comments) and Swelling     Throat swelling       Prozac [Fluoxetine] Nausea     Patient requested a change      Social History     Tobacco Use     Smoking status: Never Smoker     Smokeless tobacco: Never Used     Tobacco comment: no smokers in the household   Substance Use Topics     Alcohol use: No      Wt Readings from Last 1 Encounters:   22 128.2 kg (282 lb 11.2 oz)        Anesthesia Evaluation   Pt has had prior anesthetic. Type: Regional.        ROS/MED HX  ENT/Pulmonary:     (+) asthma     Neurologic:  - neg neurologic ROS     Cardiovascular:     (+) hypertension--PIH and Mg ++ gtt and Mild/mod ---    METS/Exercise Tolerance:     Hematologic:  - neg hematologic  ROS     Musculoskeletal:       GI/Hepatic:  - neg GI/hepatic ROS     Renal/Genitourinary:       Endo:     (+) Obesity,     Psychiatric/Substance Use:  - neg psychiatric ROS     Infectious Disease:       Malignancy:       Other:            Physical Exam    Airway        Mallampati: II   TM distance: > 3 FB   Neck ROM: full   Mouth opening: > 3 cm    Respiratory Devices and Support         Dental  no  notable dental history         Cardiovascular   cardiovascular exam normal          Pulmonary   pulmonary exam normal                OUTSIDE LABS:  CBC:   Lab Results   Component Value Date    WBC 6.9 03/22/2022    WBC 8.9 09/01/2021    HGB 11.1 (L) 03/22/2022    HGB 12.1 02/09/2022    HCT 33.9 (L) 03/22/2022    HCT 38.0 09/01/2021     03/22/2022     09/01/2021     BMP:   Lab Results   Component Value Date     03/22/2022     07/29/2018    POTASSIUM 3.9 03/22/2022    POTASSIUM 3.7 07/29/2018    CHLORIDE 110 (H) 03/22/2022    CHLORIDE 108 07/29/2018    CO2 20 03/22/2022    CO2 22 07/29/2018    BUN 6 (L) 03/22/2022    BUN 7 07/29/2018    CR 0.50 (L) 03/22/2022    CR 0.67 07/29/2018     (H) 03/22/2022    GLC 81 07/29/2018     COAGS:   Lab Results   Component Value Date    PTT 25 10/31/2001    INR <1.0 10/31/2001     POC:   Lab Results   Component Value Date    HCG Negative 10/02/2018    HCGS  12/15/2010     Negative   This test provides a presumptive diagnosis of pregnancy or non-pregnancy. A   confirmed pregnancy diagnosis should only be made by a physician after all   clinical and laboratory findings have been evaluated.     HEPATIC:   Lab Results   Component Value Date    ALBUMIN 3.0 (L) 07/29/2018    PROTTOTAL 6.3 (L) 07/29/2018    ALT 12 03/22/2022    AST 15 03/22/2022    ALKPHOS 50 07/29/2018    BILITOTAL 0.4 07/29/2018     OTHER:   Lab Results   Component Value Date    PH 8.0 (H) 04/09/2002    LACT 0.5 (L) 07/29/2018    A1C 5.4 09/01/2021    SUMMER 8.3 (L) 03/22/2022    TSH 0.88 12/15/2010    CRP 29.2 (H) 07/29/2018    SED 4 10/31/2001       Anesthesia Plan    ASA Status:  2   NPO Status:  NPO Appropriate    Anesthesia Type: Epidural.              Consents    Anesthesia Plan(s) and associated risks, benefits, and realistic alternatives discussed. Questions answered and patient/representative(s) expressed understanding.    - Discussed:     - Discussed with:  Patient          Postoperative Care            Comments:    Other Comments: The risks and benefits of anesthesia, and the alternatives where applicable, have been discussed with the patient, and they wish to proceed.       neg OB ROS.       HILDA Casarez CRNA

## 2022-03-23 NOTE — ANESTHESIA PROCEDURE NOTES
Epidural catheter Procedure Note    Pre-Procedure   Staff -        Performed By: CRNA       Location: OB       Pre-Anesthestic Checklist: patient identified, IV checked, risks and benefits discussed, informed consent, monitors and equipment checked, pre-op evaluation and at physician/surgeon's request  Timeout:       Correct Patient: Yes        Correct Procedure: Yes        Correct Site: Yes        Correct Position: Yes   Procedure Documentation  Procedure: epidural catheter       Patient Position: sitting       Patient Prep/Sterile Barriers: sterile gloves, mask, patient draped       Skin prep: Betadine       Local skin infiltrated with 3 mL of 1% lidocaine.        Insertion Site: L3-4. (midline approach).       Technique: LORT air        CHARY at 8 cm.       Needle Type: ToHomejoyy needle and Gee       Needle Gauge: 17.        Needle Length (Inches): 3.5        Catheter: 19 G.         Catheter threaded easily.         4 cm epidural space.         # of attempts: 1 and  # of redirects:  0    Assessment/Narrative         Paresthesias: No.      Test dose of 3 mL lidocaine 1.5% w/ 1:200,000 epinephrine at.         Test dose negative, 3 minutes after injection, for signs of intravascular, subdural, or intrathecal injection.       Insertion/Infusion Method: LORT air       Aspiration negative for Heme or CSF via Epidural Catheter.       Sensory Level Left: T6.       Sensory Level Right: T6.     Comments:  Pt had expected physiological response from epidural placement.  She denied any paresthesias during placement.  Her contraction pain diminished after loading dose was administered.  She had a negative response to the test dose medication.  Will be available if further intervention is required.

## 2022-03-23 NOTE — PROGRESS NOTES
S: Patient desires Epidural  B: Patient is a  who presented for induction of labor, She is now 5 dialated, FHT's 140s, cat 1  A: Jason WATSON called and in room at 1940. Patient and procedure correctly identified/verified with CRNA. Consent signed. 500 fluid bolus given. Patient in position for epidural placement. Epidural placed without complications. Test dose/bolus given by CRNA and patient tolerated well. Patient rates her pain after procedure as 1/10.  R: Will continue to monitor.

## 2022-03-24 VITALS
BODY MASS INDEX: 43.5 KG/M2 | TEMPERATURE: 97.9 F | OXYGEN SATURATION: 98 % | SYSTOLIC BLOOD PRESSURE: 141 MMHG | HEART RATE: 90 BPM | DIASTOLIC BLOOD PRESSURE: 86 MMHG | WEIGHT: 253.4 LBS | RESPIRATION RATE: 18 BRPM

## 2022-03-24 PROCEDURE — 250N000013 HC RX MED GY IP 250 OP 250 PS 637: Performed by: FAMILY MEDICINE

## 2022-03-24 RX ORDER — ACETAMINOPHEN 325 MG/1
650 TABLET ORAL EVERY 4 HOURS PRN
COMMUNITY
Start: 2022-03-24

## 2022-03-24 RX ORDER — IBUPROFEN 600 MG/1
600 TABLET, FILM COATED ORAL EVERY 6 HOURS PRN
Qty: 3 TABLET | COMMUNITY
Start: 2022-03-24

## 2022-03-24 RX ORDER — NIFEDIPINE 30 MG
30 TABLET, EXTENDED RELEASE ORAL DAILY
Qty: 60 TABLET | Refills: 1 | Status: SHIPPED | OUTPATIENT
Start: 2022-03-25 | End: 2024-02-07

## 2022-03-24 RX ADMIN — FLUTICASONE FUROATE AND VILANTEROL TRIFENATATE 1 PUFF: 200; 25 POWDER RESPIRATORY (INHALATION) at 08:20

## 2022-03-24 RX ADMIN — PANTOPRAZOLE SODIUM 20 MG: 20 TABLET, DELAYED RELEASE ORAL at 08:18

## 2022-03-24 RX ADMIN — IBUPROFEN 600 MG: 600 TABLET, FILM COATED ORAL at 06:09

## 2022-03-24 RX ADMIN — DOCUSATE SODIUM 100 MG: 100 CAPSULE, LIQUID FILLED ORAL at 08:18

## 2022-03-24 RX ADMIN — PRENATAL VIT W/ FE FUMARATE-FA TAB 27-0.8 MG 1 TABLET: 27-0.8 TAB at 08:18

## 2022-03-24 RX ADMIN — VENLAFAXINE HYDROCHLORIDE 37.5 MG: 37.5 CAPSULE, EXTENDED RELEASE ORAL at 08:19

## 2022-03-24 RX ADMIN — NIFEDIPINE 30 MG: 30 TABLET, FILM COATED, EXTENDED RELEASE ORAL at 08:18

## 2022-03-24 NOTE — DISCHARGE SUMMARY
[facility] Discharge Summary    Kena Chan MRN# [id number]   Age: 27 year old YOB: 1995     Date of Admission:  3/22/2022  Date of Discharge::  3/24/2022  Admitting Physician:  Rafael Lemus Mai, MD  Discharge Physician:  Lance Tang MD            Admission Diagnoses:   Labor and delivery, indication for care [O75.9]  Normal labor [O80, Z37.9]  Active Problems:    Mild persistent asthma without complication    High-risk pregnancy    Labor and delivery, indication for care    Pre-eclampsia, severe, antepartum, third trimester               Discharge Diagnosis:     Active Problems:    Mild persistent asthma without complication    High-risk pregnancy    Labor and delivery, indication for care    Pre-eclampsia, severe, antepartum, third trimester               Procedures:     Procedure(s):             Medications Prior to Admission:     Medications Prior to Admission   Medication Sig Dispense Refill Last Dose     albuterol (PROAIR HFA/PROVENTIL HFA/VENTOLIN HFA) 108 (90 Base) MCG/ACT inhaler Inhale 2 puffs into the lungs every 6 hours as needed for shortness of breath / dyspnea or wheezing 2 Inhaler 0 More than a month at Unknown time     budesonide-formoterol (SYMBICORT) 160-4.5 MCG/ACT Inhaler INHALE 2 PUFFS INTO THE LUNGS TWICE DAILY 30.6 g 3 3/22/2022 at 0800     cetirizine (ZYRTEC ALLERGY) 10 MG tablet Take 1 tablet (10 mg) by mouth daily 90 tablet 0 3/22/2022 at 0800     Prenatal Vit-Fe Fumarate-FA (PRENATAL MULTIVITAMIN W/IRON) 27-0.8 MG tablet Take 1 tablet by mouth daily   3/22/2022 at 0800     venlafaxine (EFFEXOR-ER) 37.5 MG 24 hr tablet Take 1 tablet (37.5 mg) by mouth daily TAKE 1 TABLET(37.5 MG) BY MOUTH DAILY 90 tablet 1 3/22/2022 at 0800     [DISCONTINUED] omeprazole (PRILOSEC) 10 MG DR capsule Take 10 mg by mouth daily   3/22/2022 at 0800     [DISCONTINUED] ondansetron (ZOFRAN-ODT) 4 MG ODT tab Take 1 tablet (4 mg) by mouth every 8 hours as needed for nausea (Patient not  taking: Reported on 3/9/2022) 20 tablet 1      [DISCONTINUED] ondansetron (ZOFRAN-ODT) 4 MG ODT tab Take 1-2 tablets (4-8 mg) by mouth every 8 hours as needed for nausea 30 tablet 3 More than a month at Unknown time             Discharge Medications:     Current Discharge Medication List      START taking these medications    Details   acetaminophen (TYLENOL) 325 MG tablet Take 2 tablets (650 mg) by mouth every 4 hours as needed for mild pain or fever (greater than or equal to 38  C /100.4  F (oral) or 38.5  C/ 101.4  F (core).)      ibuprofen (ADVIL/MOTRIN) 600 MG tablet Take 1 tablet (600 mg) by mouth every 6 hours as needed for moderate pain  Qty: 3 tablet      NIFEdipine ER OSMOTIC (ADALAT CC) 30 MG 24 hr tablet Take 1 tablet (30 mg) by mouth daily  Qty: 60 tablet, Refills: 1    Associated Diagnoses: Pre-eclampsia, severe, antepartum, third trimester         CONTINUE these medications which have NOT CHANGED    Details   albuterol (PROAIR HFA/PROVENTIL HFA/VENTOLIN HFA) 108 (90 Base) MCG/ACT inhaler Inhale 2 puffs into the lungs every 6 hours as needed for shortness of breath / dyspnea or wheezing  Qty: 2 Inhaler, Refills: 0    Associated Diagnoses: Mild persistent asthma without complication      budesonide-formoterol (SYMBICORT) 160-4.5 MCG/ACT Inhaler INHALE 2 PUFFS INTO THE LUNGS TWICE DAILY  Qty: 30.6 g, Refills: 3    Associated Diagnoses: Mild persistent asthma without complication      cetirizine (ZYRTEC ALLERGY) 10 MG tablet Take 1 tablet (10 mg) by mouth daily  Qty: 90 tablet, Refills: 0    Associated Diagnoses: Seasonal allergic rhinitis, unspecified trigger      Prenatal Vit-Fe Fumarate-FA (PRENATAL MULTIVITAMIN W/IRON) 27-0.8 MG tablet Take 1 tablet by mouth daily      venlafaxine (EFFEXOR-ER) 37.5 MG 24 hr tablet Take 1 tablet (37.5 mg) by mouth daily TAKE 1 TABLET(37.5 MG) BY MOUTH DAILY  Qty: 90 tablet, Refills: 1    Associated Diagnoses: Generalized anxiety disorder; Major depressive disorder,  recurrent episode, mild (H)         STOP taking these medications       omeprazole (PRILOSEC) 10 MG DR capsule Comments:   Reason for Stopping:         ondansetron (ZOFRAN-ODT) 4 MG ODT tab Comments:   Reason for Stopping:         ondansetron (ZOFRAN-ODT) 4 MG ODT tab Comments:   Reason for Stopping:                       Consultations:   No consultations were requested during this admission          Brief History of Labor or Admission:   Admitted with severe features. Treated with anti HTN and mag. Delivered with uneventful .            Hospital Course:   The patient's hospital course was remarkable for elevated BP even post partum.  She had an uneventful . She recovered as anticipated and experienced -complications of hypertension was was started  On nifedipine. Diuresed nicely. On discharge, her pain was well controlled. Vaginal bleeding is similar to peak menstrual flow.  Voiding without difficulty.  Ambulating well and tolerating a normal diet.  No fever or significant wound drainage.  Both breast and bottle formula feeding well.  Infant is stable. She was discharged on post-partum day #2    On nifedipine for HTN. Recheck with baby check in < 1 wk. Does not need appt.    Post-partum hemoglobin:   Hemoglobin   Date Value Ref Range Status   2022 10.0 (L) 11.7 - 15.7 g/dL Final   2018 12.5 11.7 - 15.7 g/dL Final             Discharge Instructions and Follow-Up:     Discharge diet: Advance to a regular diet as tolerated   Discharge activity: No heavy lifting, pushing, pulling for 2 week(s)  Pelvic rest: abstain from intercourse and do not use tampons for 6 week(s)   Discharge follow-up: Follow up with primary care provider in 6-8 weeks, sooner if BP issues   Wound care: Drink plenty of fluids  Ice to area for comfort  Keep wound clean and dry           Discharge Disposition:     Discharged to home          Lance Tang MD

## 2022-03-24 NOTE — PLAN OF CARE
Goal Outcome Evaluation:      S-(situation): shift note    B-(background): , Preeclampsia, induced labor,     A-(assessment): VSS, mag sulfate discontinued at . Pt diuresing well. Up independently to BR. Denies headache or other symptoms. Is having yuko tenderness, taking ibuprofen, tylenol, using ice packs.     R-(recommendations): cont routine pp cares, and preeclampsia monitoring

## 2022-03-24 NOTE — PLAN OF CARE
Goal Outcome Evaluation:    2 days post vaginal delivery, complicated by PIH, vaginal laceration repaired. Magnesium was turned off during last evening, IV saline locked. Patient denies HA/change of vision, No clonus, Reflexes +2. Fundus firm, light bleeding noted. Voiding well. Edema improving. BPs 140s/80s. Ibuprofen PRN for cramping. Ice, tucks, spray to perineum. Has not  baby during the night, requested to formula feed baby. Independent in cares for self and baby. Hoping to be able to discharge home today.

## 2022-03-25 ENCOUNTER — E-VISIT (OUTPATIENT)
Dept: FAMILY MEDICINE | Facility: CLINIC | Age: 27
End: 2022-03-25
Payer: COMMERCIAL

## 2022-03-25 DIAGNOSIS — Z53.9 ERRONEOUS ENCOUNTER--DISREGARD: Primary | ICD-10-CM

## 2022-03-25 PROCEDURE — 99207 PR NON-BILLABLE SERV PER CHARTING: CPT | Performed by: FAMILY MEDICINE

## 2022-03-27 NOTE — PROGRESS NOTES
Kena Chan  Gender: female  : 1995  [address]   [place] [address]  [phone] (home)   Medical Record: [id number]  Primary Care Provider: Lance Tang       [facility]   ?   Discharge Phone Call: Key Words/Key Times     How are you and the baby? great    How are feedings going? well    Voiding & Stooling? yes    Any questions or concerns? no    Follow-up appointment? It's in 2 days      We want to provide excellent care here at [facility]. Do you have any feedback for us that would help us improve? You guys were fantastic    Call back COMMENTS:         Attempted Calls:   _________     __________

## 2022-04-05 ENCOUNTER — E-VISIT (OUTPATIENT)
Dept: FAMILY MEDICINE | Facility: CLINIC | Age: 27
End: 2022-04-05
Payer: COMMERCIAL

## 2022-04-05 DIAGNOSIS — N76.0 BV (BACTERIAL VAGINOSIS): ICD-10-CM

## 2022-04-05 DIAGNOSIS — B96.89 BV (BACTERIAL VAGINOSIS): ICD-10-CM

## 2022-04-05 DIAGNOSIS — N89.8 VAGINAL DISCHARGE: Primary | ICD-10-CM

## 2022-04-05 PROCEDURE — 99421 OL DIG E/M SVC 5-10 MIN: CPT | Performed by: FAMILY MEDICINE

## 2022-04-05 NOTE — TELEPHONE ENCOUNTER
Provider E-Visit time total (minutes):     Can an RN triage this. Is it for BV? Uterine infection (abd tenderness)?

## 2022-04-06 NOTE — PATIENT INSTRUCTIONS
Thank you for choosing us for your care. Given your symptoms, I would like you to do a lab-only visit to determine what is causing them.  I have placed the orders.  Please schedule an appointment with the lab right here in [place], or call [phone].  I will let you know when the results are back and next steps to take.

## 2022-04-06 NOTE — TELEPHONE ENCOUNTER
Patient returned call.  She reports discharge over the past week.  Discharge is grey and brown in color, but has a strong fishy type odor. She denies pelvic pain, abdominal pain, or fevers.     Do you want her to come in for swabs.     Sharlene Askew Rn

## 2022-04-08 ENCOUNTER — LAB (OUTPATIENT)
Dept: LAB | Facility: CLINIC | Age: 27
End: 2022-04-08
Payer: COMMERCIAL

## 2022-04-08 DIAGNOSIS — N89.8 VAGINAL DISCHARGE: ICD-10-CM

## 2022-04-08 LAB
ALBUMIN UR-MCNC: 30 MG/DL
APPEARANCE UR: ABNORMAL
BACTERIA #/AREA URNS HPF: ABNORMAL /HPF
BILIRUB UR QL STRIP: NEGATIVE
CLUE CELLS: PRESENT
COLOR UR AUTO: YELLOW
GLUCOSE UR STRIP-MCNC: NEGATIVE MG/DL
HGB UR QL STRIP: ABNORMAL
KETONES UR STRIP-MCNC: NEGATIVE MG/DL
LEUKOCYTE ESTERASE UR QL STRIP: ABNORMAL
MUCOUS THREADS #/AREA URNS LPF: PRESENT /LPF
NITRATE UR QL: NEGATIVE
PH UR STRIP: 7 [PH] (ref 5–7)
RBC URINE: 31 /HPF
SP GR UR STRIP: 1.01 (ref 1–1.03)
SQUAMOUS EPITHELIAL: 1 /HPF
TRICHOMONAS, WET PREP: ABNORMAL
UROBILINOGEN UR STRIP-MCNC: NORMAL MG/DL
WBC CLUMPS #/AREA URNS HPF: PRESENT /HPF
WBC URINE: >182 /HPF
WBC'S/HIGH POWER FIELD, WET PREP: ABNORMAL
YEAST, WET PREP: ABNORMAL

## 2022-04-08 PROCEDURE — 81001 URINALYSIS AUTO W/SCOPE: CPT

## 2022-04-08 PROCEDURE — 87591 N.GONORRHOEAE DNA AMP PROB: CPT

## 2022-04-08 PROCEDURE — 87491 CHLMYD TRACH DNA AMP PROBE: CPT

## 2022-04-08 PROCEDURE — 87086 URINE CULTURE/COLONY COUNT: CPT

## 2022-04-08 PROCEDURE — 87210 SMEAR WET MOUNT SALINE/INK: CPT | Performed by: FAMILY MEDICINE

## 2022-04-09 ENCOUNTER — HEALTH MAINTENANCE LETTER (OUTPATIENT)
Age: 27
End: 2022-04-09

## 2022-04-09 LAB
BACTERIA UR CULT: NORMAL
C TRACH DNA SPEC QL NAA+PROBE: NEGATIVE
N GONORRHOEA DNA SPEC QL NAA+PROBE: NEGATIVE

## 2022-04-10 DIAGNOSIS — F33.0 MAJOR DEPRESSIVE DISORDER, RECURRENT EPISODE, MILD (H): ICD-10-CM

## 2022-04-10 DIAGNOSIS — F41.1 GENERALIZED ANXIETY DISORDER: ICD-10-CM

## 2022-04-11 RX ORDER — METRONIDAZOLE 500 MG/1
500 TABLET ORAL 2 TIMES DAILY
Qty: 14 TABLET | Refills: 0 | Status: SHIPPED | OUTPATIENT
Start: 2022-04-11 | End: 2022-04-18

## 2022-04-11 NOTE — TELEPHONE ENCOUNTER
Routing refill request to provider for review/approval because:  Elevated BP on file.     Sharlene Askew Rn

## 2022-04-13 ENCOUNTER — MYC MEDICAL ADVICE (OUTPATIENT)
Dept: FAMILY MEDICINE | Facility: CLINIC | Age: 27
End: 2022-04-13
Payer: COMMERCIAL

## 2022-04-13 RX ORDER — VENLAFAXINE HYDROCHLORIDE 37.5 MG/1
TABLET, EXTENDED RELEASE ORAL
Qty: 90 TABLET | Refills: 1 | Status: SHIPPED | OUTPATIENT
Start: 2022-04-13 | End: 2022-11-07

## 2022-04-14 ENCOUNTER — APPOINTMENT (OUTPATIENT)
Dept: URGENT CARE | Facility: CLINIC | Age: 27
End: 2022-04-14
Payer: COMMERCIAL

## 2022-04-14 DIAGNOSIS — F41.1 GENERALIZED ANXIETY DISORDER: ICD-10-CM

## 2022-04-14 DIAGNOSIS — F33.0 MAJOR DEPRESSIVE DISORDER, RECURRENT EPISODE, MILD (H): ICD-10-CM

## 2022-04-14 RX ORDER — VENLAFAXINE HYDROCHLORIDE 37.5 MG/1
TABLET, EXTENDED RELEASE ORAL
Qty: 90 TABLET | Refills: 1 | OUTPATIENT
Start: 2022-04-14

## 2022-04-26 ENCOUNTER — MEDICAL CORRESPONDENCE (OUTPATIENT)
Dept: HEALTH INFORMATION MANAGEMENT | Facility: CLINIC | Age: 27
End: 2022-04-26
Payer: COMMERCIAL

## 2022-07-05 ENCOUNTER — OFFICE VISIT (OUTPATIENT)
Dept: FAMILY MEDICINE | Facility: CLINIC | Age: 27
End: 2022-07-05
Payer: COMMERCIAL

## 2022-07-05 VITALS
SYSTOLIC BLOOD PRESSURE: 112 MMHG | BODY MASS INDEX: 41.54 KG/M2 | DIASTOLIC BLOOD PRESSURE: 62 MMHG | OXYGEN SATURATION: 97 % | HEART RATE: 89 BPM | WEIGHT: 242 LBS | TEMPERATURE: 97 F

## 2022-07-05 DIAGNOSIS — Z30.430 ENCOUNTER FOR INSERTION OF INTRAUTERINE CONTRACEPTIVE DEVICE (IUD): ICD-10-CM

## 2022-07-05 DIAGNOSIS — Z00.00 ANNUAL PHYSICAL EXAM: Primary | ICD-10-CM

## 2022-07-05 LAB — HCG UR QL: NEGATIVE

## 2022-07-05 PROCEDURE — 58300 INSERT INTRAUTERINE DEVICE: CPT | Performed by: FAMILY MEDICINE

## 2022-07-05 PROCEDURE — 99395 PREV VISIT EST AGE 18-39: CPT | Mod: 25 | Performed by: FAMILY MEDICINE

## 2022-07-05 PROCEDURE — 81025 URINE PREGNANCY TEST: CPT | Performed by: FAMILY MEDICINE

## 2022-07-05 ASSESSMENT — ENCOUNTER SYMPTOMS
HEMATURIA: 0
FEVER: 0
ABDOMINAL PAIN: 0
ARTHRALGIAS: 0
MYALGIAS: 0
BREAST MASS: 0
NERVOUS/ANXIOUS: 0
HEADACHES: 0
CONSTIPATION: 0
SORE THROAT: 0
NAUSEA: 0
JOINT SWELLING: 0
COUGH: 1
PALPITATIONS: 0
HEARTBURN: 0
SHORTNESS OF BREATH: 0
HEMATOCHEZIA: 0
DIZZINESS: 0
CHILLS: 0
FREQUENCY: 0
WEAKNESS: 0
DYSURIA: 0
PARESTHESIAS: 0
EYE PAIN: 0
DIARRHEA: 0

## 2022-07-05 ASSESSMENT — PAIN SCALES - GENERAL: PAINLEVEL: NO PAIN (0)

## 2022-07-05 NOTE — PROGRESS NOTES
SUBJECTIVE:   CC: Kena Chan is an 27 year old woman who presents for preventive health visit.         Healthy Habits:     Getting at least 3 servings of Calcium per day:  Yes    Bi-annual eye exam:  Yes    Dental care twice a year:  NO    Sleep apnea or symptoms of sleep apnea:  Daytime drowsiness    Diet:  Regular (no restrictions)    Frequency of exercise:  1 day/week    Duration of exercise:  15-30 minutes    Taking medications regularly:  Yes    Medication side effects:  None    PHQ-2 Total Score: 1    Additional concerns today:  No    Today's PHQ-2 Score:   PHQ-2 ( 1999 [employer]) 7/5/2022   Q1: Little interest or pleasure in doing things 1   Q2: Feeling down, depressed or hopeless 0   PHQ-2 Score 1   PHQ-2 Total Score (12-17 Years)- Positive if 3 or more points; Administer PHQ-A if positive -   Q1: Little interest or pleasure in doing things Several days   Q2: Feeling down, depressed or hopeless Not at all   PHQ-2 Score 1       Abuse: Current or Past (Physical, Sexual or Emotional) - No  Do you feel safe in your environment? Yes        Social History     Tobacco Use     Smoking status: Never Smoker     Smokeless tobacco: Never Used     Tobacco comment: no smokers in the household   Substance Use Topics     Alcohol use: No         Alcohol Use 7/5/2022   Prescreen: >3 drinks/day or >7 drinks/week? No   Prescreen: >3 drinks/day or >7 drinks/week? -       Reviewed orders with patient.  Reviewed health maintenance and updated orders accordingly -       Breast Cancer Screening:    Breast CA Risk Assessment (FHS-7) 7/5/2022   Do you have a family history of breast, colon, or ovarian cancer? No / Unknown           Pertinent mammograms are reviewed under the imaging tab.    History of abnormal Pap smear:   PAP / HPV 2/5/2020   PAP (Historical) NIL     Reviewed and updated as needed this visit by clinical staff   Tobacco  Allergies  Meds   Med Hx  Surg Hx  Fam Hx  Soc Hx          Reviewed and updated as  needed this visit by Provider                       Review of Systems   Constitutional: Negative for chills and fever.   HENT: Positive for congestion. Negative for ear pain, hearing loss and sore throat.    Eyes: Negative for pain and visual disturbance.   Respiratory: Positive for cough. Negative for shortness of breath.    Cardiovascular: Negative for chest pain, palpitations and peripheral edema.   Gastrointestinal: Negative for abdominal pain, constipation, diarrhea, heartburn, hematochezia and nausea.   Breasts:  Negative for tenderness, breast mass and discharge.   Genitourinary: Positive for vaginal discharge. Negative for dysuria, frequency, genital sores, hematuria, pelvic pain, urgency and vaginal bleeding.   Musculoskeletal: Negative for arthralgias, joint swelling and myalgias.   Skin: Negative for rash.   Neurological: Negative for dizziness, weakness, headaches and paresthesias.   Psychiatric/Behavioral: Negative for mood changes. The patient is not nervous/anxious.           OBJECTIVE:   /62 (Cuff Size: Adult Large)   Pulse 89   Temp 97  F (36.1  C) (Temporal)   Wt 109.8 kg (242 lb)   LMP 06/20/2022   SpO2 97%   BMI 41.54 kg/m    Physical Exam    Well-appearing.  Heart regular rate lungs clear unlabored.  Abdomen benign.  Pelvic normal.  Chaperone present.  Extremities warm well perfused no edema.  Neurologically nonfocal.  Procedure    Procedure-after informed consent a placed the large speculum and visualize the cervix with some difficulty giving its very posterior position and sidewall redundant vaginal tissue.  I was able to grasped the cervix with a tenaculum and then after swabbing with Betadine and placed the Mirena without difficulty.  Tolerated well.      ASSESSMENT/PLAN:       ICD-10-CM    1. Annual physical exam  Z00.00    2. Encounter for insertion of intrauterine contraceptive device (IUD)  Z30.430 HCG Qual, Urine ([id number])     HCG Qual, Urine ([id number])     levonorgestrel  "(MIRENA) 20 MCG/DAY IUD 20 mcg     Annual topics reviewed  After discussion of her options, she elects for an IUD which she is had in the past.  Placed today  Encouraged weight loss and healthy diet and activity      COUNSELING:      Estimated body mass index is 41.54 kg/m  as calculated from the following:    Height as of 1/19/22: 1.626 m (5' 4\").    Weight as of this encounter: 109.8 kg (242 lb).        She reports that she has never smoked. She has never used smokeless tobacco.      Counseling Resources:  ATP IV Guidelines  Pooled Cohorts Equation Calculator  Breast Cancer Risk Calculator  BRCA-Related Cancer Risk Assessment: FHS-7 Tool  FRAX Risk Assessment  ICSI Preventive Guidelines  Dietary Guidelines for Americans, 2010  USDA's MyPlate  ASA Prophylaxis  Lung CA Screening    Lance Tang MD  [facility]  "

## 2022-07-06 ENCOUNTER — E-VISIT (OUTPATIENT)
Dept: FAMILY MEDICINE | Facility: CLINIC | Age: 27
End: 2022-07-06
Payer: COMMERCIAL

## 2022-07-06 DIAGNOSIS — J02.9 PHARYNGITIS, UNSPECIFIED ETIOLOGY: Primary | ICD-10-CM

## 2022-07-06 PROCEDURE — 99421 OL DIG E/M SVC 5-10 MIN: CPT | Performed by: FAMILY MEDICINE

## 2022-07-07 NOTE — PATIENT INSTRUCTIONS
Thank you for choosing us for your care. Given your symptoms, I would like you to do a lab-only visit to determine what is causing them.  I have placed the orders.  Please schedule an appointment with the lab right here in [facility], or call [phone].  I will let you know when the results are back and next steps to take.

## 2022-07-08 ENCOUNTER — ALLIED HEALTH/NURSE VISIT (OUTPATIENT)
Dept: FAMILY MEDICINE | Facility: CLINIC | Age: 27
End: 2022-07-08
Payer: COMMERCIAL

## 2022-07-08 DIAGNOSIS — J02.9 PHARYNGITIS, UNSPECIFIED ETIOLOGY: Primary | ICD-10-CM

## 2022-07-08 LAB
DEPRECATED S PYO AG THROAT QL EIA: NEGATIVE
GROUP A STREP BY PCR: NOT DETECTED
SARS-COV-2 RNA RESP QL NAA+PROBE: NEGATIVE

## 2022-07-08 PROCEDURE — 99207 PR NO CHARGE NURSE ONLY: CPT

## 2022-07-08 PROCEDURE — U0003 INFECTIOUS AGENT DETECTION BY NUCLEIC ACID (DNA OR RNA); SEVERE ACUTE RESPIRATORY SYNDROME CORONAVIRUS 2 (SARS-COV-2) (CORONAVIRUS DISEASE [COVID-19]), AMPLIFIED PROBE TECHNIQUE, MAKING USE OF HIGH THROUGHPUT TECHNOLOGIES AS DESCRIBED BY CMS-2020-01-R: HCPCS

## 2022-07-08 PROCEDURE — 87651 STREP A DNA AMP PROBE: CPT

## 2022-07-08 PROCEDURE — U0005 INFEC AGEN DETEC AMPLI PROBE: HCPCS

## 2022-07-08 NOTE — PROGRESS NOTES
Patient had E visit on 7/6/2022 with Dr. Tang. Covid and strep test was ordered. Swabs were completed today and brought to lab.   Margoth Lopez MA

## 2022-08-03 ENCOUNTER — MYC MEDICAL ADVICE (OUTPATIENT)
Dept: FAMILY MEDICINE | Facility: CLINIC | Age: 27
End: 2022-08-03

## 2022-08-04 NOTE — TELEPHONE ENCOUNTER
Patient calling would like to get in to see Dr. Clyde LEE to get her IUD removed as she is having problems with it. Please call patient to advise

## 2022-08-05 NOTE — TELEPHONE ENCOUNTER
Patient said the iud is making her mood change, do you want to make a appointment to get it removed or see if she wants to wait longer?

## 2022-08-11 ENCOUNTER — OFFICE VISIT (OUTPATIENT)
Dept: FAMILY MEDICINE | Facility: CLINIC | Age: 27
End: 2022-08-11
Payer: COMMERCIAL

## 2022-08-11 VITALS
SYSTOLIC BLOOD PRESSURE: 120 MMHG | DIASTOLIC BLOOD PRESSURE: 64 MMHG | HEART RATE: 118 BPM | WEIGHT: 251 LBS | RESPIRATION RATE: 16 BRPM | OXYGEN SATURATION: 96 % | BODY MASS INDEX: 43.08 KG/M2 | TEMPERATURE: 97.5 F

## 2022-08-11 DIAGNOSIS — I10 BENIGN ESSENTIAL HYPERTENSION: ICD-10-CM

## 2022-08-11 DIAGNOSIS — Z12.4 CERVICAL CANCER SCREENING: Primary | ICD-10-CM

## 2022-08-11 DIAGNOSIS — Z30.09 BIRTH CONTROL COUNSELING: ICD-10-CM

## 2022-08-11 DIAGNOSIS — F41.1 GENERALIZED ANXIETY DISORDER: ICD-10-CM

## 2022-08-11 PROCEDURE — 99214 OFFICE O/P EST MOD 30 MIN: CPT | Performed by: FAMILY MEDICINE

## 2022-08-11 PROCEDURE — G0145 SCR C/V CYTO,THINLAYER,RESCR: HCPCS | Performed by: FAMILY MEDICINE

## 2022-08-11 RX ORDER — NIFEDIPINE 30 MG
30 TABLET, EXTENDED RELEASE ORAL DAILY
Qty: 90 TABLET | Refills: 1 | Status: SHIPPED | OUTPATIENT
Start: 2022-08-11 | End: 2023-01-31

## 2022-08-11 RX ORDER — BUSPIRONE HYDROCHLORIDE 5 MG/1
5 TABLET ORAL 2 TIMES DAILY
Qty: 60 TABLET | Refills: 1 | Status: SHIPPED | OUTPATIENT
Start: 2022-08-11 | End: 2022-12-02

## 2022-08-11 RX ORDER — NORELGESTROMIN AND ETHINYL ESTRADIOL 35; 150 UG/MG; UG/MG
PATCH TRANSDERMAL
Qty: 3 PATCH | Refills: 1 | Status: SHIPPED | OUTPATIENT
Start: 2022-08-11 | End: 2024-02-07

## 2022-08-11 ASSESSMENT — PATIENT HEALTH QUESTIONNAIRE - PHQ9
SUM OF ALL RESPONSES TO PHQ QUESTIONS 1-9: 12
10. IF YOU CHECKED OFF ANY PROBLEMS, HOW DIFFICULT HAVE THESE PROBLEMS MADE IT FOR YOU TO DO YOUR WORK, TAKE CARE OF THINGS AT HOME, OR GET ALONG WITH OTHER PEOPLE: VERY DIFFICULT
SUM OF ALL RESPONSES TO PHQ QUESTIONS 1-9: 12

## 2022-08-11 ASSESSMENT — PAIN SCALES - GENERAL: PAINLEVEL: NO PAIN (0)

## 2022-08-11 NOTE — PROGRESS NOTES
Assessment & Plan       ICD-10-CM    1. Cervical cancer screening  Z12.4 Pap screen reflex to HPV if ASCUS - recommend age 25 - 29   2. Benign essential hypertension  I10 NIFEdipine ER (ADALAT CC) 30 MG 24 hr tablet   3. Generalized anxiety disorder  F41.1 busPIRone (BUSPAR) 5 MG tablet   4. Birth control counseling  Z30.09 norelgestromin-ethinyl estradiol (ORTHO EVRA) 150-35 MCG/24HR patch        IUD removed today based on her preference.  Preeclampsia now converted to likely longer standing hypertension.  Refill provided.  Should work aggressively on weight loss, blood pressure may normalized.  Anxiety uncontrolled.  Discussed options.  She will continue counseling.  Add BuSpar 5 mg twice daily, increase to 10 if not improved  For birth control, she will transition to a patch.  No history of stroke, blood clot, or migraine      No follow-ups on file.    Lance Tang MD  [facility] TA Escudero is a 27 year old female who presents to clinic today for the following health issues     HPI     Answers for HPI/ROS submitted by the patient on 8/11/2022  If you checked off any problems, how difficult have these problems made it for you to do your work, take care of things at home, or get along with other people?: Very difficult  PHQ9 TOTAL SCORE: 12  What is the reason for your visit today? : Iud  How many servings of fruits and vegetables do you eat daily?: 0-1  On average, how many sweetened beverages do you drink each day (Examples: soda, juice, sweet tea, etc.  Do NOT count diet or artificially sweetened beverages)?: 2  How many minutes a day do you exercise enough to make your heart beat faster?: 9 or less  How many days a week do you exercise enough to make your heart beat faster?: 3 or less  How many days per week do you miss taking your medication?: 0      IUD Removal, complains is affecting her mood  Anxiety worse, despite increasing her venlafaxine  Continues to be in  "counseling  We discussed her options in terms of birth control, sexually active with her significant other      Review of Systems   Constitutional, HEENT, cardiovascular, pulmonary, gi and gu systems are negative, except as otherwise noted.      Objective    /64 (BP Location: Left arm, Patient Position: Sitting, Cuff Size: Adult Large)   Pulse 118   Temp 97.5  F (36.4  C) (Temporal)   Resp 16   Wt 113.9 kg (251 lb)   LMP 06/20/2022   SpO2 96%   BMI 43.08 kg/m       Physical Exam   Well-appearing and in no distress. Mood \"okay\". Affect, mentation appropriate. Insight and judgment intact. speech normal rate and content. No evidence of emelyn, SI, HI. No psychosis.  Pelvic-normal external genitalia.  Longer speculum was placed to find a deeper and posterior cervix.  Strings visualized.  IUD removed intact.  Chaperone present               "

## 2022-08-16 LAB
BKR LAB AP GYN ADEQUACY: NORMAL
BKR LAB AP GYN INTERPRETATION: NORMAL
BKR LAB AP HPV REFLEX: NORMAL
BKR LAB AP PREVIOUS ABNORMAL: NORMAL
PATH REPORT.COMMENTS IMP SPEC: NORMAL
PATH REPORT.COMMENTS IMP SPEC: NORMAL
PATH REPORT.RELEVANT HX SPEC: NORMAL

## 2022-10-09 ENCOUNTER — HEALTH MAINTENANCE LETTER (OUTPATIENT)
Age: 27
End: 2022-10-09

## 2022-10-31 DIAGNOSIS — F41.1 GENERALIZED ANXIETY DISORDER: ICD-10-CM

## 2022-10-31 DIAGNOSIS — J45.30 MILD PERSISTENT ASTHMA WITHOUT COMPLICATION: ICD-10-CM

## 2022-10-31 DIAGNOSIS — F33.0 MAJOR DEPRESSIVE DISORDER, RECURRENT EPISODE, MILD (H): ICD-10-CM

## 2022-11-03 NOTE — TELEPHONE ENCOUNTER
Routing refill request to provider for review/approval because:    Requested Prescriptions   Pending Prescriptions Disp Refills    venlafaxine (EFFEXOR-ER) 37.5 MG 24 hr tablet [Pharmacy Med Name: VENLAFAXINE ER 37.5MG TABLETS] 90 tablet 1     Sig: TAKE 1 TABLET BY MOUTH ONCE DAILY       Serotonin-Norepinephrine Reuptake Inhibitors  Failed - 10/31/2022 12:14 AM        Failed - PHQ-9 score of less than 5 in past 6 months     Please review last PHQ-9 score.

## 2022-11-03 NOTE — TELEPHONE ENCOUNTER
"Routing refill request to provider for review/approval because:    Requested Prescriptions   Pending Prescriptions Disp Refills    SYMBICORT 160-4.5 MCG/ACT Inhaler [Pharmacy Med Name: SYMBICORT 160/4.5MCG (120 ORAL INH)] 10.2 g      Sig: INHALE 2 PUFFS INTO THE LUNGS TWICE DAILY       Inhaled Steroids Protocol Failed - 10/31/2022 12:14 AM        Failed - Asthma control assessment score within normal limits in last 6 months     Please review ACT score.           Passed - Patient is age 12 or older        Passed - Medication is active on med list        Passed - Recent (6 mo) or future (30 days) visit within the authorizing provider's specialty     Patient had office visit in the last 6 months or has a visit in the next 30 days with authorizing provider or within the authorizing provider's specialty.  See \"Patient Info\" tab in inbasket, or \"Choose Columns\" in Meds & Orders section of the refill encounter.           Long-Acting Beta Agonist Inhalers Protocol  Failed - 10/31/2022 12:14 AM        Failed - Asthma control assessment score within normal limits in last 6 months     Please review ACT score.        "

## 2022-11-07 RX ORDER — BUDESONIDE AND FORMOTEROL FUMARATE DIHYDRATE 160; 4.5 UG/1; UG/1
AEROSOL RESPIRATORY (INHALATION)
Qty: 10.2 G | Refills: 3 | Status: SHIPPED | OUTPATIENT
Start: 2022-11-07 | End: 2024-02-29

## 2022-11-07 RX ORDER — VENLAFAXINE HYDROCHLORIDE 37.5 MG/1
TABLET, EXTENDED RELEASE ORAL
Qty: 90 TABLET | Refills: 1 | Status: SHIPPED | OUTPATIENT
Start: 2022-11-07 | End: 2023-02-04

## 2022-11-21 ENCOUNTER — OFFICE VISIT (OUTPATIENT)
Dept: PODIATRY | Facility: CLINIC | Age: 27
End: 2022-11-21
Payer: COMMERCIAL

## 2022-11-21 DIAGNOSIS — L08.9 INFECTION OF TOE: ICD-10-CM

## 2022-11-21 DIAGNOSIS — L03.031 PARONYCHIA, TOE, RIGHT: Primary | ICD-10-CM

## 2022-11-21 PROCEDURE — 99203 OFFICE O/P NEW LOW 30 MIN: CPT | Mod: 25 | Performed by: PODIATRIST

## 2022-11-21 PROCEDURE — 11750 EXCISION NAIL&NAIL MATRIX: CPT | Mod: T5 | Performed by: PODIATRIST

## 2022-11-21 RX ORDER — AZITHROMYCIN 250 MG/1
TABLET, FILM COATED ORAL
Qty: 6 TABLET | Refills: 0 | Status: SHIPPED | OUTPATIENT
Start: 2022-11-21 | End: 2024-02-07

## 2022-11-21 RX ORDER — NEOMYCIN SULFATE, POLYMYXIN B SULFATE, HYDROCORTISONE 3.5; 10000; 1 MG/ML; [USP'U]/ML; MG/ML
1-2 SOLUTION/ DROPS AURICULAR (OTIC) 2 TIMES DAILY
Qty: 10 ML | Refills: 0 | Status: SHIPPED | OUTPATIENT
Start: 2022-11-21 | End: 2022-12-05

## 2022-11-21 NOTE — LETTER
11/21/2022         RE: Kena Chan  [address]  [facility] [address]        Dear Colleague,    Thank you for referring your patient, Kena Chan, to the [facility]. Please see a copy of my visit note below.    Past Medical History:   Diagnosis Date     Chronic adenoiditis 3/3/2010     Encounter for insertion of mirena IUD 10/2/2018    Remove by 10/2/2023     Hypertrophy of nasal turbinates 3/3/2010     Iron deficiency anemia, unspecified     TREATED WITH IRON SUPPLEMENTS     Mild intermittent asthma      Plantar fasciitis 3/15/2012     Pneumonia, organism unspecified(486)     Pneumonia     Post partum depression 7/5/2018     Seasonal allergies      Patient Active Problem List   Diagnosis     Attention deficit disorder     Generalized anxiety disorder     Chronic adenoiditis     Severe obesity (BMI 35.0-39.9)     Major depressive disorder, recurrent episode, mild (H)     Mild persistent asthma without complication     Seasonal allergic rhinitis     History of postpartum depression     High-risk pregnancy     Labor and delivery, indication for care     Pre-eclampsia, severe, antepartum, third trimester     Past Surgical History:   Procedure Laterality Date     HC THERAPUTIC FRACTURE INFER TURBINATE  03/30/10     TONSILLECTOMY & ADENOIDECTOMY  03/30/10    turbinoplasty     Social History     Socioeconomic History     Marital status:      Spouse name: Not on file     Number of children: Not on file     Years of education: Not on file     Highest education level: Not on file   Occupational History     Not on file   Tobacco Use     Smoking status: Never     Smokeless tobacco: Never     Tobacco comments:     no smokers in the household   Vaping Use     Vaping Use: Never used   Substance and Sexual Activity     Alcohol use: No     Drug use: No     Sexual activity: Yes     Partners: Male   Other Topics Concern     Not on file   Social History Narrative    1/2018  Lives in  [place] with  Vaughn and daughter, Dinah.  Vaughn smokes.  Has indoor cats.  Aware of toxoplasmosis precautions.  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  No concerns about domestic violence.     Social Determinants of Health     Financial Resource Strain: Not on file   Food Insecurity: Not on file   Transportation Needs: Not on file   Physical Activity: Not on file   Stress: Not on file   Social Connections: Not on file   Intimate Partner Violence: Not on file   Housing Stability: Not on file     Family History   Problem Relation Age of Onset     Gastrointestinal Disease Father      Heart Disease Father      Back Pain Father      Diabetes Mother      Hypertension Mother      Hyperlipidemia Mother      Anxiety Disorder Sister      Depression Sister      Hearing Loss Sister         congenital     Coronary Artery Disease Maternal Grandmother      Cerebrovascular Disease Maternal Grandmother      Diabetes Maternal Grandfather         Type II     Cerebrovascular Disease Maternal Grandfather      Cancer Paternal Grandmother         lung (she was a smoker)     Coronary Artery Disease Paternal Grandfather         aortic aneurysm     Food Allergy Daughter 3        peanut     Allergies Daughter      Asthma Daughter            SUBJECTIVE FINDINGS:  A 27 year old presents for a painful ingrown toenail on the right lateral hallux nail border that she feels is infected.  She relates it has been going on for about 3 weeks' duration.  She has soaked it in Epsom salt and used a Band-Aid and Neosporin on it, and it has not gotten any better.  She relates no injuries.  She relates it hurts.  She relates she did have the left big toenail border removed in the past for similar problem.  She relates she does have some allergies to penicillin and Keflex.  She relates she has taken Zithromax and clindamycin in the past with no problems.    ALLERGIES:  Penicillin and Keflex.    OBJECTIVE FINDINGS:  DP and PT are 2/4, right.  CFT is less than 3  seconds.  She has a right lateral hallux nail border with serosanguineous pustular drainage.  There is erythema and edema and pain on palpation of the lateral nail border.  There is an incurvated nail.  There is no odor, no calor.    ASSESSMENT AND PLAN:  Paronychia, right lateral hallux nail border, with infection. Diagnosis and treatment options discussed with the patient.  The patient requests a PNA procedure.  Procedure, potential risks, expected benefits, expected outcomes and followup discussed with the patient.  Consent signed.  The right hallux was anesthetized with 5 mL of 1% lidocaine plain using a hallux digit block.  The right foot was prepped and draped in the sterile technique.  Anesthesia was checked and achieved.  The right hallux was exsanguinated and a Penrose tourniquet applied to the base of the hallux.  The right lateral hallux nail border was freed from its margins using a dermal spatula.  The right lateral hallux nail border was removed using an English anvil and a hemostat.  The nail border was curetted of debris.  Phenol was applied using EZ phenol swabs x3 for 30 seconds each to the right lateral hallux nail border.  The right lateral hallux nail border was copiously irrigated with rubbing alcohol.  The tourniquet was released, and CFT returned to less than 3 seconds, which was preop level.  Bacitracin and sterile compression dressing applied to the right lateral hallux nail border.  The patient was instructed on postop wound cares and foot soaks.  She will use Tylenol or Advil for postoperative pain management.  Prescription for Zithromax and Cortisporin otic solution given and use discussed with her.  Dressings were dispensed.  Return to clinic and see me in 1 week.          Moderate level of medical decision making.      Again, thank you for allowing me to participate in the care of your patient.        Sincerely,        Hemant Steward DPM

## 2022-11-21 NOTE — NURSING NOTE
Kena Chan's chief complaint for this visit includes:  Chief Complaint   Patient presents with     Consult     Left foot big toe infection     PCP: Lance Tang    Referring Provider:  No referring provider defined for this encounter.    There were no vitals taken for this visit.  Data Unavailable        Allergies   Allergen Reactions     Keflex [Cephalexin Hcl] Hives     Penicillins Other (See Comments) and Swelling     Throat swelling       Prozac [Fluoxetine] Nausea     Patient requested a change         Do you need any medication refills at today's visit?

## 2022-12-12 ENCOUNTER — MYC MEDICAL ADVICE (OUTPATIENT)
Dept: FAMILY MEDICINE | Facility: CLINIC | Age: 27
End: 2022-12-12

## 2022-12-12 DIAGNOSIS — E66.01 SEVERE OBESITY WITH BODY MASS INDEX (BMI) OF 35.0 TO 39.9 WITH SERIOUS COMORBIDITY (H): Primary | ICD-10-CM

## 2023-01-28 DIAGNOSIS — I10 BENIGN ESSENTIAL HYPERTENSION: ICD-10-CM

## 2023-01-30 NOTE — TELEPHONE ENCOUNTER
Pending Prescriptions:                       Disp   Refills    NIFEdipine ER (ADALAT CC) 30 MG 24 hr tabl*90 tab*1        Sig: TAKE 1 TABLET(30 MG) BY MOUTH DAILY      Routing refill request to provider for review/approval because:  Labs out of range:  creatinine    Gudelia Cosme RN on 1/30/2023 at 12:16 PM

## 2023-01-31 RX ORDER — NIFEDIPINE 30 MG
TABLET, EXTENDED RELEASE ORAL
Qty: 90 TABLET | Refills: 1 | Status: SHIPPED | OUTPATIENT
Start: 2023-01-31 | End: 2024-02-07

## 2023-02-02 ENCOUNTER — E-VISIT (OUTPATIENT)
Dept: FAMILY MEDICINE | Facility: CLINIC | Age: 28
End: 2023-02-02

## 2023-02-02 ENCOUNTER — NURSE TRIAGE (OUTPATIENT)
Dept: FAMILY MEDICINE | Facility: CLINIC | Age: 28
End: 2023-02-02
Payer: COMMERCIAL

## 2023-02-02 ENCOUNTER — E-VISIT (OUTPATIENT)
Dept: URGENT CARE | Facility: CLINIC | Age: 28
End: 2023-02-02
Payer: COMMERCIAL

## 2023-02-02 DIAGNOSIS — J01.91 ACUTE RECURRENT SINUSITIS, UNSPECIFIED LOCATION: Primary | ICD-10-CM

## 2023-02-02 DIAGNOSIS — J01.90 ACUTE BACTERIAL SINUSITIS: Primary | ICD-10-CM

## 2023-02-02 DIAGNOSIS — B96.89 ACUTE BACTERIAL SINUSITIS: Primary | ICD-10-CM

## 2023-02-02 PROCEDURE — 99207 PR NON-BILLABLE SERV PER CHARTING: CPT | Performed by: FAMILY MEDICINE

## 2023-02-02 PROCEDURE — 99421 OL DIG E/M SVC 5-10 MIN: CPT | Performed by: FAMILY MEDICINE

## 2023-02-02 RX ORDER — DOXYCYCLINE HYCLATE 100 MG
100 TABLET ORAL 2 TIMES DAILY
Qty: 14 TABLET | Refills: 0 | Status: SHIPPED | OUTPATIENT
Start: 2023-02-02 | End: 2023-02-09

## 2023-02-02 NOTE — PATIENT INSTRUCTIONS
Dear Kena Chan,    We are sorry you are not feeling well. Based on the responses you provided, it is recommended that you be seen in-person in urgent care so we can better evaluate your symptoms. Please click here to find the nearest urgent care location to you.   You will not be charged for this Visit. Thank you for trusting us with your care.    Gabbie Dewitt MD

## 2023-02-02 NOTE — PATIENT INSTRUCTIONS
Dear Kena Chan    After reviewing your responses, I've been able to diagnose you with Acute bacterial sinusitis.      Based on your responses and diagnosis, I have prescribed   Orders Placed This Encounter     doxycycline hyclate (VIBRA-TABS) 100 MG tablet    to treat your symptoms. I have sent this to your pharmacy.?     It is also important to stay well hydrated, get lots of rest and take over-the-counter decongestants,?tylenol?or ibuprofen if you?are able to?take those medications per your primary care provider to help relieve discomfort.?     It is important that you take?all of?your prescribed medication even if your symptoms are improving after a few doses.? Taking?all of?your medicine helps prevent the symptoms from returning.?     If your symptoms worsen, you develop severe headache, vomiting, high fever (>102), or are not improving in 7 days, please contact your primary care provider for an appointment or visit any of our convenient Walk-in Care or Urgent Care Centers to be seen which can be found on our website?here.?     Thanks again for choosing?us?as your health care partner,?   ?  Lance Tang MD?

## 2023-02-02 NOTE — TELEPHONE ENCOUNTER
Patient has had nasal congestion for 10 days.  Her discharge is green.  She states her teeth are starting to hurt.  Pain level.    Per protocol patient advised to do an evisit today.    Gudelia Cosme RN on 2/2/2023 at 10:44 AM      Reason for Disposition    SEVERE sinus pain    Additional Information    Negative: Sounds like a life-threatening emergency to the triager    Negative: Difficulty breathing, and not from stuffy nose (e.g., not relieved by cleaning out the nose)    Negative: SEVERE headache and has fever    Negative: Patient sounds very sick or weak to the triager    Protocols used: SINUS PAIN OR CONGESTION-A-OH

## 2023-02-03 DIAGNOSIS — F41.1 GENERALIZED ANXIETY DISORDER: ICD-10-CM

## 2023-02-03 DIAGNOSIS — F33.0 MAJOR DEPRESSIVE DISORDER, RECURRENT EPISODE, MILD (H): ICD-10-CM

## 2023-02-03 NOTE — TELEPHONE ENCOUNTER
"Requested Prescriptions   Pending Prescriptions Disp Refills    venlafaxine (EFFEXOR-ER) 37.5 MG 24 hr tablet [Pharmacy Med Name: VENLAFAXINE ER 37.5MG TABLETS] 90 tablet 1     Sig: TAKE 1 TABLET BY MOUTH EVERY DAY       Serotonin-Norepinephrine Reuptake Inhibitors  Failed - 2/3/2023  8:03 AM        Failed - PHQ-9 score of less than 5 in past 6 months     Please review last PHQ-9 score.           Failed - Normal serum creatinine on file in past 12 months     Recent Labs   Lab Test 03/22/22  2358   CR 0.49*       Ok to refill medication if creatinine is low          Passed - Blood pressure under 140/90 in past 12 months     BP Readings from Last 3 Encounters:   08/11/22 120/64   07/05/22 112/62   03/24/22 (!) 141/86                 Passed - Medication is active on med list        Passed - Patient is age 18 or older        Passed - No active pregnancy on record        Passed - No positive pregnancy test in past 12 months        Passed - Recent (6 mo) or future (30 days) visit within the authorizing provider's specialty     Patient had office visit in the last 6 months or has a visit in the next 30 days with authorizing provider or within the authorizing provider's specialty.  See \"Patient Info\" tab in inbasket, or \"Choose Columns\" in Meds & Orders section of the refill encounter.                 "

## 2023-02-04 RX ORDER — VENLAFAXINE HYDROCHLORIDE 37.5 MG/1
TABLET, EXTENDED RELEASE ORAL
Qty: 90 TABLET | Refills: 1 | Status: SHIPPED | OUTPATIENT
Start: 2023-02-04 | End: 2023-08-17

## 2023-02-28 ENCOUNTER — E-VISIT (OUTPATIENT)
Dept: FAMILY MEDICINE | Facility: CLINIC | Age: 28
End: 2023-02-28
Payer: COMMERCIAL

## 2023-02-28 DIAGNOSIS — B96.89 ACUTE BACTERIAL SINUSITIS: Primary | ICD-10-CM

## 2023-02-28 DIAGNOSIS — J01.90 ACUTE BACTERIAL SINUSITIS: Primary | ICD-10-CM

## 2023-02-28 PROCEDURE — 99421 OL DIG E/M SVC 5-10 MIN: CPT | Performed by: FAMILY MEDICINE

## 2023-03-01 RX ORDER — LEVOFLOXACIN 500 MG/1
500 TABLET, FILM COATED ORAL DAILY
Qty: 5 TABLET | Refills: 0 | Status: SHIPPED | OUTPATIENT
Start: 2023-03-01 | End: 2023-03-06

## 2023-03-01 NOTE — PATIENT INSTRUCTIONS
Dear Kena Chan    After reviewing your responses, I've been able to diagnose you with Acute bacterial sinusitis.      Based on your responses and diagnosis, I have prescribed   Orders Placed This Encounter     levofloxacin (LEVAQUIN) 500 MG tablet    to treat your symptoms. I have sent this to your pharmacy.?     It is also important to stay well hydrated, get lots of rest and take over-the-counter decongestants,?tylenol?or ibuprofen if you?are able to?take those medications per your primary care provider to help relieve discomfort.?     It is important that you take?all of?your prescribed medication even if your symptoms are improving after a few doses.? Taking?all of?your medicine helps prevent the symptoms from returning.?     If your symptoms worsen, you develop severe headache, vomiting, high fever (>102), or are not improving in 7 days, please contact your primary care provider for an appointment or visit any of our convenient Walk-in Care or Urgent Care Centers to be seen which can be found on our website?here.?     Thanks again for choosing?us?as your health care partner,?   ?  Lance Tang MD?

## 2023-03-27 ENCOUNTER — E-VISIT (OUTPATIENT)
Dept: FAMILY MEDICINE | Facility: CLINIC | Age: 28
End: 2023-03-27
Payer: COMMERCIAL

## 2023-03-27 DIAGNOSIS — J02.9 SORE THROAT: Primary | ICD-10-CM

## 2023-03-27 PROCEDURE — 99421 OL DIG E/M SVC 5-10 MIN: CPT | Performed by: FAMILY MEDICINE

## 2023-03-27 RX ORDER — AZITHROMYCIN 250 MG/1
TABLET, FILM COATED ORAL
Qty: 6 TABLET | Refills: 0 | Status: SHIPPED | OUTPATIENT
Start: 2023-03-27 | End: 2023-04-01

## 2023-05-21 ENCOUNTER — HEALTH MAINTENANCE LETTER (OUTPATIENT)
Age: 28
End: 2023-05-21

## 2023-06-20 ENCOUNTER — MYC MEDICAL ADVICE (OUTPATIENT)
Dept: FAMILY MEDICINE | Facility: CLINIC | Age: 28
End: 2023-06-20
Payer: COMMERCIAL

## 2023-06-20 ENCOUNTER — NURSE TRIAGE (OUTPATIENT)
Dept: FAMILY MEDICINE | Facility: CLINIC | Age: 28
End: 2023-06-20
Payer: COMMERCIAL

## 2023-06-20 NOTE — TELEPHONE ENCOUNTER
patient sent in a mychart:    patient has been having flashes of light.  She did have this happen today.    No weakness or numbness on one side of her body.  She did have some dizziness and she was sitting down. This is now gone.  She does have a mild headache  No eye pain.    She lost half of her vision in her eye. It took 3 minutes to go away.    Per protocol patient advised to call her eye doctor for today.    Please advise on dizziness from earlier also.    Gudelia Cosme RN on 6/20/2023 at 12:34 PM    Reason for Disposition    Flashes of light  (Exception: brief from pressing on the eyeball)    Additional Information    Negative: Weakness of the face, arm or leg on one side of the body    Negative: Followed getting substance in the eye    Negative: Foreign body or object is or was lodged in the eye    Negative: Followed an eye injury    Negative: Followed sun lamp or sun exposure (UV keratitis)    Negative: Yellow or green discharge (pus) in the eye    Negative: Pregnant    Negative: Complete loss of vision in 1 or both eyes    Negative: SEVERE eye pain    Negative: Severe headache    Negative: Double vision    Negative: Blurred vision or visual changes and present now and sudden onset or new (e.g., minutes, hours, days)  (Exception: Seeing floaters / black specks OR previously diagnosed migraine headaches with same symptoms.)    Negative: Patient sounds very sick or weak to the triager    Protocols used: VISION LOSS OR CHANGE-A-OH

## 2023-06-27 ENCOUNTER — TRANSFERRED RECORDS (OUTPATIENT)
Dept: HEALTH INFORMATION MANAGEMENT | Facility: CLINIC | Age: 28
End: 2023-06-27
Payer: COMMERCIAL

## 2023-06-27 ENCOUNTER — TELEPHONE (OUTPATIENT)
Dept: FAMILY MEDICINE | Facility: CLINIC | Age: 28
End: 2023-06-27
Payer: COMMERCIAL

## 2023-06-27 NOTE — TELEPHONE ENCOUNTER
Eye clinic is calling regarding patient having blacking out of vision in her right eye.    They would like to know if he would be comfortable ordering and MRI of the brain and the Orbits.    Dominique is not seeing any abnormal occular findings, but is concerned that there may be something with the brain or occular nerve.    They would like a call back to either the eye clinic or the patient.  Please advise.      Gudelia Cosme RN on 6/27/2023 at 11:54 AM

## 2023-06-28 NOTE — TELEPHONE ENCOUNTER
I called this patient with the below message from Dr. Tang.  She stated she went to see her eye doctor in [place], ( [facility], Dr. aGrcia).  He stated he didn't find anything with her eye and recommended an MRI.

## 2023-06-28 NOTE — TELEPHONE ENCOUNTER
RN huddled with Dr. Tang and he would like patient to see an ophthamologist and not an optometrist to assure MRI of the brain in necessary. RN called patient and let her know. Patient stated understanding and no further concerns at this time.

## 2023-07-03 DIAGNOSIS — H53.129 TRANSIENT VISUAL LOSS, UNSPECIFIED LATERALITY: Primary | ICD-10-CM

## 2023-08-04 ENCOUNTER — E-VISIT (OUTPATIENT)
Dept: FAMILY MEDICINE | Facility: CLINIC | Age: 28
End: 2023-08-04
Payer: COMMERCIAL

## 2023-08-04 DIAGNOSIS — J06.9 VIRAL URI: Primary | ICD-10-CM

## 2023-08-04 PROCEDURE — 99421 OL DIG E/M SVC 5-10 MIN: CPT | Performed by: FAMILY MEDICINE

## 2023-08-07 NOTE — PATIENT INSTRUCTIONS
The symptoms you describe suggest a viral cause, which is much more common than a bacterial cause. Antibiotics will treat bacterial infections, but have no effect on viral infections. If possible, especially if improving, start with symptom care for the first 7-10 days, then consider seeking further treatment or taking an antibiotic. Bacterial infections generally are more severe, including symptoms such as pus, fever over 101degrees F, or rapidly worsening.  Dear Kena Chan    After reviewing your responses, I've been able to diagnose you with Viral URI.      Based on your responses and diagnosis, I have prescribed No orders of the defined types were placed in this encounter.   to treat your symptoms. I have sent this to your pharmacy.?     It is also important to stay well hydrated, get lots of rest and take over-the-counter decongestants,?tylenol?or ibuprofen if you?are able to?take those medications per your primary care provider to help relieve discomfort.?     It is important that you take?all of?your prescribed medication even if your symptoms are improving after a few doses.? Taking?all of?your medicine helps prevent the symptoms from returning.?     If your symptoms worsen, you develop severe headache, vomiting, high fever (>102), or are not improving in 7 days, please contact your primary care provider for an appointment or visit any of our convenient Walk-in Care or Urgent Care Centers to be seen which can be found on our website?here.?     Thanks again for choosing?us?as your health care partner,?   ?  Lance Tang MD?

## 2023-08-15 DIAGNOSIS — F41.1 GENERALIZED ANXIETY DISORDER: ICD-10-CM

## 2023-08-15 DIAGNOSIS — F33.0 MAJOR DEPRESSIVE DISORDER, RECURRENT EPISODE, MILD (H): ICD-10-CM

## 2023-08-17 ENCOUNTER — E-VISIT (OUTPATIENT)
Dept: FAMILY MEDICINE | Facility: CLINIC | Age: 28
End: 2023-08-17
Payer: COMMERCIAL

## 2023-08-17 DIAGNOSIS — K13.79 SORE MOUTH: Primary | ICD-10-CM

## 2023-08-17 PROCEDURE — 99207 PR NON-BILLABLE SERV PER CHARTING: CPT | Performed by: FAMILY MEDICINE

## 2023-08-17 RX ORDER — VENLAFAXINE HYDROCHLORIDE 37.5 MG/1
TABLET, EXTENDED RELEASE ORAL
Qty: 90 TABLET | Refills: 1 | Status: SHIPPED | OUTPATIENT
Start: 2023-08-17 | End: 2024-02-07

## 2023-09-20 ENCOUNTER — TELEPHONE (OUTPATIENT)
Dept: FAMILY MEDICINE | Facility: CLINIC | Age: 28
End: 2023-09-20
Payer: COMMERCIAL

## 2023-09-20 ENCOUNTER — E-VISIT (OUTPATIENT)
Dept: FAMILY MEDICINE | Facility: CLINIC | Age: 28
End: 2023-09-20
Payer: COMMERCIAL

## 2023-09-20 DIAGNOSIS — R30.0 DIFFICULT OR PAINFUL URINATION: Primary | ICD-10-CM

## 2023-09-20 PROCEDURE — 99421 OL DIG E/M SVC 5-10 MIN: CPT | Performed by: FAMILY MEDICINE

## 2023-09-20 NOTE — TELEPHONE ENCOUNTER
Patient calling to state she submitted an E-Visit approx 30 minutes ago. Informed it is noted on providers schedule and that he is with his last patient in clinic. Informed to sit tight and provider will address when he is able. Patient questioning if she could just get a lab order? Bina Larios LPN

## 2023-09-21 ENCOUNTER — LAB (OUTPATIENT)
Dept: LAB | Facility: CLINIC | Age: 28
End: 2023-09-21
Payer: COMMERCIAL

## 2023-09-21 DIAGNOSIS — R30.0 DIFFICULT OR PAINFUL URINATION: ICD-10-CM

## 2023-09-21 LAB
ALBUMIN UR-MCNC: NEGATIVE MG/DL
APPEARANCE UR: CLEAR
BACTERIA #/AREA URNS HPF: ABNORMAL /HPF
BILIRUB UR QL STRIP: NEGATIVE
CLUE CELLS: ABNORMAL
COLOR UR AUTO: YELLOW
GLUCOSE UR STRIP-MCNC: NEGATIVE MG/DL
HGB UR QL STRIP: NEGATIVE
KETONES UR STRIP-MCNC: NEGATIVE MG/DL
LEUKOCYTE ESTERASE UR QL STRIP: ABNORMAL
MUCOUS THREADS #/AREA URNS LPF: PRESENT /LPF
NITRATE UR QL: NEGATIVE
PH UR STRIP: 7 [PH] (ref 5–7)
RBC URINE: <1 /HPF
SP GR UR STRIP: 1.01 (ref 1–1.03)
SQUAMOUS EPITHELIAL: 2 /HPF
TRICHOMONAS, WET PREP: ABNORMAL
UROBILINOGEN UR STRIP-MCNC: NORMAL MG/DL
WBC URINE: 3 /HPF
WBC'S/HIGH POWER FIELD, WET PREP: ABNORMAL
YEAST, WET PREP: ABNORMAL

## 2023-09-21 PROCEDURE — 87210 SMEAR WET MOUNT SALINE/INK: CPT

## 2023-09-21 PROCEDURE — 81001 URINALYSIS AUTO W/SCOPE: CPT

## 2023-12-22 ENCOUNTER — E-VISIT (OUTPATIENT)
Dept: FAMILY MEDICINE | Facility: CLINIC | Age: 28
End: 2023-12-22
Payer: COMMERCIAL

## 2023-12-22 DIAGNOSIS — N89.8 VAGINAL DISCHARGE: Primary | ICD-10-CM

## 2023-12-22 PROCEDURE — 99421 OL DIG E/M SVC 5-10 MIN: CPT | Performed by: FAMILY MEDICINE

## 2023-12-22 RX ORDER — NITROFURANTOIN 25; 75 MG/1; MG/1
100 CAPSULE ORAL 2 TIMES DAILY
Qty: 14 CAPSULE | Refills: 0 | Status: SHIPPED | OUTPATIENT
Start: 2023-12-22 | End: 2023-12-29

## 2023-12-22 RX ORDER — METRONIDAZOLE 500 MG/1
500 TABLET ORAL 2 TIMES DAILY
Qty: 14 TABLET | Refills: 0 | Status: SHIPPED | OUTPATIENT
Start: 2023-12-22 | End: 2023-12-29

## 2023-12-22 RX ORDER — FLUCONAZOLE 150 MG/1
150 TABLET ORAL ONCE
Qty: 1 TABLET | Refills: 0 | Status: SHIPPED | OUTPATIENT
Start: 2023-12-22 | End: 2023-12-22

## 2023-12-23 ENCOUNTER — APPOINTMENT (OUTPATIENT)
Dept: LAB | Facility: CLINIC | Age: 28
End: 2023-12-23
Payer: COMMERCIAL

## 2023-12-27 DIAGNOSIS — F41.1 GENERALIZED ANXIETY DISORDER: ICD-10-CM

## 2023-12-27 RX ORDER — BUSPIRONE HYDROCHLORIDE 5 MG/1
5 TABLET ORAL 2 TIMES DAILY
Qty: 180 TABLET | Refills: 3 | Status: SHIPPED | OUTPATIENT
Start: 2023-12-27 | End: 2024-02-07

## 2024-01-04 ENCOUNTER — PATIENT OUTREACH (OUTPATIENT)
Dept: CARE COORDINATION | Facility: CLINIC | Age: 29
End: 2024-01-04
Payer: COMMERCIAL

## 2024-01-18 ENCOUNTER — PATIENT OUTREACH (OUTPATIENT)
Dept: CARE COORDINATION | Facility: CLINIC | Age: 29
End: 2024-01-18
Payer: COMMERCIAL

## 2024-01-31 ENCOUNTER — E-VISIT (OUTPATIENT)
Dept: FAMILY MEDICINE | Facility: CLINIC | Age: 29
End: 2024-01-31
Payer: COMMERCIAL

## 2024-01-31 DIAGNOSIS — R53.83 OTHER FATIGUE: Primary | ICD-10-CM

## 2024-01-31 PROCEDURE — 99421 OL DIG E/M SVC 5-10 MIN: CPT | Performed by: FAMILY MEDICINE

## 2024-01-31 NOTE — TELEPHONE ENCOUNTER
Provider E-Visit time total (minutes):   5    Lab ordered. She may want a visit if this is normal to discuss fatigue

## 2024-02-01 ENCOUNTER — LAB (OUTPATIENT)
Dept: LAB | Facility: CLINIC | Age: 29
End: 2024-02-01
Payer: COMMERCIAL

## 2024-02-01 ENCOUNTER — MYC MEDICAL ADVICE (OUTPATIENT)
Dept: FAMILY MEDICINE | Facility: CLINIC | Age: 29
End: 2024-02-01

## 2024-02-01 DIAGNOSIS — D64.9 ANEMIA, UNSPECIFIED TYPE: Primary | ICD-10-CM

## 2024-02-01 DIAGNOSIS — D64.9 ANEMIA, UNSPECIFIED TYPE: ICD-10-CM

## 2024-02-01 DIAGNOSIS — R53.83 OTHER FATIGUE: ICD-10-CM

## 2024-02-01 LAB — HGB BLD-MCNC: 7.6 G/DL (ref 11.7–15.7)

## 2024-02-01 PROCEDURE — 85027 COMPLETE CBC AUTOMATED: CPT

## 2024-02-01 PROCEDURE — 36415 COLL VENOUS BLD VENIPUNCTURE: CPT

## 2024-02-02 LAB
ERYTHROCYTE [DISTWIDTH] IN BLOOD BY AUTOMATED COUNT: 19.4 % (ref 10–15)
HCT VFR BLD AUTO: 30.2 % (ref 35–47)
HGB BLD-MCNC: 7.6 G/DL (ref 11.7–15.7)
MCH RBC QN AUTO: 14.8 PG (ref 26.5–33)
MCHC RBC AUTO-ENTMCNC: 25.2 G/DL (ref 31.5–36.5)
MCV RBC AUTO: 59 FL (ref 78–100)
PLATELET # BLD AUTO: 404 10E3/UL (ref 150–450)
RBC # BLD AUTO: 5.12 10E6/UL (ref 3.8–5.2)
WBC # BLD AUTO: 5.4 10E3/UL (ref 4–11)

## 2024-02-02 NOTE — TELEPHONE ENCOUNTER
Hemoglobin is very very low. I would like her to come back for more labs and set up clinic visit next week to discuss. She can start iron  daily to help for now. rh

## 2024-02-07 ENCOUNTER — OFFICE VISIT (OUTPATIENT)
Dept: FAMILY MEDICINE | Facility: CLINIC | Age: 29
End: 2024-02-07
Payer: COMMERCIAL

## 2024-02-07 VITALS
HEART RATE: 78 BPM | HEIGHT: 64 IN | OXYGEN SATURATION: 99 % | RESPIRATION RATE: 16 BRPM | DIASTOLIC BLOOD PRESSURE: 80 MMHG | TEMPERATURE: 97.3 F | WEIGHT: 246.2 LBS | BODY MASS INDEX: 42.03 KG/M2 | SYSTOLIC BLOOD PRESSURE: 120 MMHG

## 2024-02-07 DIAGNOSIS — K21.9 GASTRIC REFLUX: ICD-10-CM

## 2024-02-07 DIAGNOSIS — D64.9 ANEMIA, UNSPECIFIED TYPE: Primary | ICD-10-CM

## 2024-02-07 DIAGNOSIS — D50.8 OTHER IRON DEFICIENCY ANEMIA: ICD-10-CM

## 2024-02-07 LAB
ACANTHOCYTES BLD QL SMEAR: ABNORMAL
ALBUMIN SERPL BCG-MCNC: 4.3 G/DL (ref 3.5–5.2)
ALP SERPL-CCNC: 56 U/L (ref 40–150)
ALT SERPL W P-5'-P-CCNC: 16 U/L (ref 0–50)
ANION GAP SERPL CALCULATED.3IONS-SCNC: 10 MMOL/L (ref 7–15)
AST SERPL W P-5'-P-CCNC: 17 U/L (ref 0–45)
AUER BODIES BLD QL SMEAR: ABNORMAL
BASO STIPL BLD QL SMEAR: ABNORMAL
BASOPHILS # BLD AUTO: 0.1 10E3/UL (ref 0–0.2)
BASOPHILS NFR BLD AUTO: 1 %
BILIRUB SERPL-MCNC: 0.3 MG/DL
BITE CELLS BLD QL SMEAR: ABNORMAL
BLISTER CELLS BLD QL SMEAR: ABNORMAL
BUN SERPL-MCNC: 9.5 MG/DL (ref 6–20)
BURR CELLS BLD QL SMEAR: ABNORMAL
CALCIUM SERPL-MCNC: 9.2 MG/DL (ref 8.6–10)
CHLORIDE SERPL-SCNC: 102 MMOL/L (ref 98–107)
CREAT SERPL-MCNC: 0.62 MG/DL (ref 0.51–0.95)
DACRYOCYTES BLD QL SMEAR: ABNORMAL
DEPRECATED HCO3 PLAS-SCNC: 25 MMOL/L (ref 22–29)
EGFRCR SERPLBLD CKD-EPI 2021: >90 ML/MIN/1.73M2
ELLIPTOCYTES BLD QL SMEAR: ABNORMAL
EOSINOPHIL # BLD AUTO: 0.6 10E3/UL (ref 0–0.7)
EOSINOPHIL NFR BLD AUTO: 11 %
ERYTHROCYTE [DISTWIDTH] IN BLOOD BY AUTOMATED COUNT: 21.6 % (ref 10–15)
FERRITIN SERPL-MCNC: 5 NG/ML (ref 6–175)
FOLATE SERPL-MCNC: 12.8 NG/ML (ref 4.6–34.8)
FRAGMENTS BLD QL SMEAR: SLIGHT
GLUCOSE SERPL-MCNC: 87 MG/DL (ref 70–99)
HCT VFR BLD AUTO: 32.4 % (ref 35–47)
HGB BLD-MCNC: 8.4 G/DL (ref 11.7–15.7)
HGB C CRYSTALS: ABNORMAL
HOWELL-JOLLY BOD BLD QL SMEAR: ABNORMAL
IMM GRANULOCYTES # BLD: 0 10E3/UL
IMM GRANULOCYTES NFR BLD: 1 %
IRON BINDING CAPACITY (ROCHE): 474 UG/DL (ref 240–430)
IRON SATN MFR SERPL: 4 % (ref 15–46)
IRON SERPL-MCNC: 18 UG/DL (ref 37–145)
LYMPHOCYTES # BLD AUTO: 1.5 10E3/UL (ref 0.8–5.3)
LYMPHOCYTES NFR BLD AUTO: 28 %
MCH RBC QN AUTO: 15.1 PG (ref 26.5–33)
MCHC RBC AUTO-ENTMCNC: 25.9 G/DL (ref 31.5–36.5)
MCV RBC AUTO: 58 FL (ref 78–100)
MONOCYTES # BLD AUTO: 0.4 10E3/UL (ref 0–1.3)
MONOCYTES NFR BLD AUTO: 8 %
NEUTROPHILS # BLD AUTO: 2.8 10E3/UL (ref 1.6–8.3)
NEUTROPHILS NFR BLD AUTO: 51 %
NEUTS HYPERSEG BLD QL SMEAR: ABNORMAL
NRBC # BLD AUTO: 0 10E3/UL
NRBC BLD AUTO-RTO: 0 /100
PLAT MORPH BLD: ABNORMAL
PLATELET # BLD AUTO: 419 10E3/UL (ref 150–450)
POLYCHROMASIA BLD QL SMEAR: ABNORMAL
POTASSIUM SERPL-SCNC: 4.1 MMOL/L (ref 3.4–5.3)
PROT SERPL-MCNC: 7.3 G/DL (ref 6.4–8.3)
RBC # BLD AUTO: 5.55 10E6/UL (ref 3.8–5.2)
RBC AGGLUT BLD QL: ABNORMAL
RBC MORPH BLD: ABNORMAL
RETICS # AUTO: 0.17 10E6/UL (ref 0.03–0.1)
RETICS/RBC NFR AUTO: 3 % (ref 0.5–2)
ROULEAUX BLD QL SMEAR: ABNORMAL
SICKLE CELLS BLD QL SMEAR: ABNORMAL
SMUDGE CELLS BLD QL SMEAR: ABNORMAL
SODIUM SERPL-SCNC: 137 MMOL/L (ref 135–145)
SPHEROCYTES BLD QL SMEAR: ABNORMAL
STOMATOCYTES BLD QL SMEAR: ABNORMAL
TARGETS BLD QL SMEAR: ABNORMAL
TOXIC GRANULES BLD QL SMEAR: ABNORMAL
VARIANT LYMPHS BLD QL SMEAR: ABNORMAL
VIT B12 SERPL-MCNC: 469 PG/ML (ref 232–1245)
WBC # BLD AUTO: 5.4 10E3/UL (ref 4–11)

## 2024-02-07 PROCEDURE — 80053 COMPREHEN METABOLIC PANEL: CPT | Performed by: FAMILY MEDICINE

## 2024-02-07 PROCEDURE — 85025 COMPLETE CBC W/AUTO DIFF WBC: CPT | Performed by: FAMILY MEDICINE

## 2024-02-07 PROCEDURE — 82607 VITAMIN B-12: CPT | Performed by: FAMILY MEDICINE

## 2024-02-07 PROCEDURE — 83540 ASSAY OF IRON: CPT | Performed by: FAMILY MEDICINE

## 2024-02-07 PROCEDURE — 82746 ASSAY OF FOLIC ACID SERUM: CPT | Performed by: FAMILY MEDICINE

## 2024-02-07 PROCEDURE — 83550 IRON BINDING TEST: CPT | Performed by: FAMILY MEDICINE

## 2024-02-07 PROCEDURE — 36415 COLL VENOUS BLD VENIPUNCTURE: CPT | Performed by: FAMILY MEDICINE

## 2024-02-07 PROCEDURE — 85045 AUTOMATED RETICULOCYTE COUNT: CPT | Performed by: FAMILY MEDICINE

## 2024-02-07 PROCEDURE — 82728 ASSAY OF FERRITIN: CPT | Performed by: FAMILY MEDICINE

## 2024-02-07 PROCEDURE — 99214 OFFICE O/P EST MOD 30 MIN: CPT | Performed by: FAMILY MEDICINE

## 2024-02-07 PROCEDURE — 99207 BLOOD MORPHOLOGY PATHOLOGIST REVIEW: CPT | Performed by: PATHOLOGY

## 2024-02-07 ASSESSMENT — ASTHMA QUESTIONNAIRES
QUESTION_2 LAST FOUR WEEKS HOW OFTEN HAVE YOU HAD SHORTNESS OF BREATH: ONCE OR TWICE A WEEK
ACT_TOTALSCORE: 22
QUESTION_5 LAST FOUR WEEKS HOW WOULD YOU RATE YOUR ASTHMA CONTROL: WELL CONTROLLED
QUESTION_3 LAST FOUR WEEKS HOW OFTEN DID YOUR ASTHMA SYMPTOMS (WHEEZING, COUGHING, SHORTNESS OF BREATH, CHEST TIGHTNESS OR PAIN) WAKE YOU UP AT NIGHT OR EARLIER THAN USUAL IN THE MORNING: NOT AT ALL
QUESTION_4 LAST FOUR WEEKS HOW OFTEN HAVE YOU USED YOUR RESCUE INHALER OR NEBULIZER MEDICATION (SUCH AS ALBUTEROL): NOT AT ALL
QUESTION_1 LAST FOUR WEEKS HOW MUCH OF THE TIME DID YOUR ASTHMA KEEP YOU FROM GETTING AS MUCH DONE AT WORK, SCHOOL OR AT HOME: A LITTLE OF THE TIME
ACT_TOTALSCORE: 22

## 2024-02-07 ASSESSMENT — PATIENT HEALTH QUESTIONNAIRE - PHQ9
10. IF YOU CHECKED OFF ANY PROBLEMS, HOW DIFFICULT HAVE THESE PROBLEMS MADE IT FOR YOU TO DO YOUR WORK, TAKE CARE OF THINGS AT HOME, OR GET ALONG WITH OTHER PEOPLE: SOMEWHAT DIFFICULT
SUM OF ALL RESPONSES TO PHQ QUESTIONS 1-9: 4
SUM OF ALL RESPONSES TO PHQ QUESTIONS 1-9: 4

## 2024-02-07 ASSESSMENT — PAIN SCALES - GENERAL: PAINLEVEL: NO PAIN (0)

## 2024-02-07 NOTE — PROGRESS NOTES
"  Assessment & Plan       ICD-10-CM    1. Anemia, unspecified type  D64.9 Comprehensive metabolic panel (BMP + Alb, Alk Phos, ALT, AST, Total. Bili, TP)     Iron and iron binding capacity     Ferritin     Vitamin B12     Folate     Lab Blood Morphology Pathologist Review     Comprehensive metabolic panel (BMP + Alb, Alk Phos, ALT, AST, Total. Bili, TP)      2. Gastric reflux  K21.9 omeprazole (PRILOSEC) 20 MG DR capsule           Iron deficiency anemia.  Improving on current regimen of oral iron.  Has not seen any evidence of blood loss, but I like her evaluated with upper and lower endoscopy as per usual guidelines of CHIKA of unknown cause.  She will continue on the daily iron.    No follow-ups on file.    Lance Tang MD  [facility]      Moses Escudero is a 28 year old, presenting for the following health issues:  Hemoglobin (Low hemoglobin)        2/7/2024     8:33 AM   Additional Questions   Roomed by Diana MUELLER     History of Present Illness       Reason for visit:  Low hemoglobin  Symptom onset:  More than a month    She eats 0-1 servings of fruits and vegetables daily.She consumes 2 sweetened beverage(s) daily.She exercises with enough effort to increase her heart rate 9 or less minutes per day.  She exercises with enough effort to increase her heart rate 3 or less days per week.   She is taking medications regularly.     Low energy  Eating ice  Started oral Fe last few days, feeling better  Periods light  No blood in stool  Diet normal, not vegetarian  Took daily ibu for a while, now off (back and knee pain)              Review of Systems  Constitutional, HEENT, cardiovascular, pulmonary, gi and gu systems are negative, except as otherwise noted.      Objective    /80   Pulse 78   Temp 97.3  F (36.3  C) (Temporal)   Resp 16   Ht 1.626 m (5' 4\")   Wt 111.7 kg (246 lb 3.2 oz)   LMP 01/27/2024 (Exact Date)   SpO2 99%   BMI 42.26 kg/m    Body mass index is 42.26 " kg/m .  Physical Exam       Results for orders placed or performed in visit on 02/07/24   Iron and iron binding capacity     Status: Abnormal   Result Value Ref Range    Iron 18 (L) 37 - 145 ug/dL    Iron Binding Capacity 474 (H) 240 - 430 ug/dL    Iron Sat Index 4 (L) 15 - 46 %   Ferritin     Status: Abnormal   Result Value Ref Range    Ferritin 5 (L) 6 - 175 ng/mL   Comprehensive metabolic panel (BMP + Alb, Alk Phos, ALT, AST, Total. Bili, TP)     Status: Normal   Result Value Ref Range    Sodium 137 135 - 145 mmol/L    Potassium 4.1 3.4 - 5.3 mmol/L    Carbon Dioxide (CO2) 25 22 - 29 mmol/L    Anion Gap 10 7 - 15 mmol/L    Urea Nitrogen 9.5 6.0 - 20.0 mg/dL    Creatinine 0.62 0.51 - 0.95 mg/dL    GFR Estimate >90 >60 mL/min/1.73m2    Calcium 9.2 8.6 - 10.0 mg/dL    Chloride 102 98 - 107 mmol/L    Glucose 87 70 - 99 mg/dL    Alkaline Phosphatase 56 40 - 150 U/L    AST 17 0 - 45 U/L    ALT 16 0 - 50 U/L    Protein Total 7.3 6.4 - 8.3 g/dL    Albumin 4.3 3.5 - 5.2 g/dL    Bilirubin Total 0.3 <=1.2 mg/dL   CBC with platelets and differential     Status: Abnormal   Result Value Ref Range    WBC Count 5.4 4.0 - 11.0 10e3/uL    RBC Count 5.55 (H) 3.80 - 5.20 10e6/uL    Hemoglobin 8.4 (L) 11.7 - 15.7 g/dL    Hematocrit 32.4 (L) 35.0 - 47.0 %    MCV 58 (L) 78 - 100 fL    MCH 15.1 (L) 26.5 - 33.0 pg    MCHC 25.9 (L) 31.5 - 36.5 g/dL    RDW 21.6 (H) 10.0 - 15.0 %    Platelet Count 419 150 - 450 10e3/uL    % Neutrophils 51 %    % Lymphocytes 28 %    % Monocytes 8 %    % Eosinophils 11 %    % Basophils 1 %    % Immature Granulocytes 1 %    NRBCs per 100 WBC 0 <1 /100    Absolute Neutrophils 2.8 1.6 - 8.3 10e3/uL    Absolute Lymphocytes 1.5 0.8 - 5.3 10e3/uL    Absolute Monocytes 0.4 0.0 - 1.3 10e3/uL    Absolute Eosinophils 0.6 0.0 - 0.7 10e3/uL    Absolute Basophils 0.1 0.0 - 0.2 10e3/uL    Absolute Immature Granulocytes 0.0 <=0.4 10e3/uL    Absolute NRBCs 0.0 10e3/uL   Reticulocyte count     Status: Abnormal   Result Value  Ref Range    % Reticulocyte 3.0 (H) 0.5 - 2.0 %    Absolute Reticulocyte 0.166 (H) 0.025 - 0.095 10e6/uL   RBC and Platelet Morphology     Status: Abnormal   Result Value Ref Range    Platelet Assessment  Automated Count Confirmed. Platelet morphology is normal.     Automated Count Confirmed. Platelet morphology is normal.    Acanthocytes      Saul Rods      Basophilic Stippling      Bite Cells      Blister Cells      Ema Cells      Elliptocytes      Hgb C Crystals      Crowe-Jolly Bodies      Hypersegmented Neutrophils      Polychromasia      RBC agglutination      RBC Fragments Slight (A) None Seen    Reactive Lymphocytes      Rouleaux      Sickle Cells      Smudge Cells      Spherocytes      Stomatocytes      Target Cells      Teardrop Cells      Toxic Neutrophils      RBC Morphology Confirmed RBC Indices    Lab Blood Morphology Pathologist Review     Status: Abnormal (In process)    Narrative    The following orders were created for panel order Lab Blood Morphology Pathologist Review.  Procedure                               Abnormality         Status                     ---------                               -----------         ------                     Bld morphology pathology...[[id number]]                      In process                 CBC with platelets and d...[[id number]]  Abnormal            Final result               RBC and Platelet Morphology[[id number]]  Abnormal            Final result               Reticulocyte count[[id number]]           Abnormal            Final result               Morphology Tracking[[id number]]                              Final result                 Please view results for these tests on the individual orders.           Signed Electronically by: Lance Tang MD

## 2024-02-08 LAB
PATH REPORT.COMMENTS IMP SPEC: NORMAL
PATH REPORT.FINAL DX SPEC: NORMAL
PATH REPORT.MICROSCOPIC SPEC OTHER STN: NORMAL
PATH REPORT.MICROSCOPIC SPEC OTHER STN: NORMAL
PATH REPORT.RELEVANT HX SPEC: NORMAL

## 2024-02-09 ENCOUNTER — TELEPHONE (OUTPATIENT)
Dept: GASTROENTEROLOGY | Facility: CLINIC | Age: 29
End: 2024-02-09
Payer: COMMERCIAL

## 2024-02-09 ENCOUNTER — HOSPITAL ENCOUNTER (OUTPATIENT)
Facility: CLINIC | Age: 29
End: 2024-02-09
Attending: SURGERY | Admitting: SURGERY
Payer: COMMERCIAL

## 2024-02-09 NOTE — TELEPHONE ENCOUNTER
"Endoscopy Scheduling Screen    Have you had a positive Covid test in the last 14 days?  No    Are you active on MyChart?   Yes    What insurance is in the chart?  Other:  [employer]    Ordering/Referring Provider:     Lance Tang MD      (If ordering provider performs procedure, schedule with ordering provider unless otherwise instructed. )    BMI: Estimated body mass index is 42.26 kg/m  as calculated from the following:    Height as of 2/7/24: 1.626 m (5' 4\").    Weight as of 2/7/24: 111.7 kg (246 lb 3.2 oz).     Sedation Ordered  MAC/deep sedation.   BMI<= 45 45 < BMI <= 48 48 < BMI < = 50  BMI > 50   No Restrictions No [facility]  No [facility]  [facility] with exceptions Hospital Only OR Only       Are you taking any prescription medications for pain 3 or more times per week?   NO - No RN review required.    Do you have a history of malignant hyperthermia or adverse reaction to anesthesia?  No    (Females) Are you currently pregnant?   No     Have you been diagnosed or told you have pulmonary hypertension?   No    Do you have an LVAD?  No    Have you been told you have moderate to severe sleep apnea?  No    Have you been told you have COPD, asthma, or any other lung disease?  Yes     What breathing problems do you have?  Asthma     Do you use home oxygen?  No    Have your breathing problems required an ED visit or hospitalization in the last year?  No    Do you have any heart conditions?  No     Have you ever had an organ transplant?   No    Have you ever had or are you awaiting a heart or lung transplant?   No    Have you had a stroke or transient ischemic attack (TIA aka \"mini stroke\" in the last 6 months?   No    Have you been diagnosed with or been told you have cirrhosis of the liver?   No    Are you currently on dialysis?   No    Do you need assistance transferring?   No    BMI: Estimated body mass index is 42.26 kg/m  as calculated from the following:    Height as of 2/7/24: 1.626 m (5' 4\").    Weight as " of 2/7/24: 111.7 kg (246 lb 3.2 oz).     Is patients BMI > 40 and scheduling location [facility]?  Yes (If MAC sedation is ordered, schedule PAC eval)    Do you take an injectable medication for weight loss or diabetes (excluding insulin)?  No    Do you take the medication Naltrexone?  No    Do you take blood thinners?  No       Prep   Are you currently on dialysis or do you have chronic kidney disease?  No    Do you have a diagnosis of diabetes?  No    Do you have a diagnosis of cystic fibrosis (CF)?  No    On a regular basis do you go 3 -5 days between bowel movements?  No    BMI > 40?  Yes (Extended Prep)    Preferred Pharmacy:    [facility] - [place], [place] - [address]  [address]  [facility] [address]  Phone: [phone] Fax: [phone]      Final Scheduling Details   Colonoscopy prep sent?  N/A    Procedure scheduled  Colonoscopy / Upper endoscopy (EGD)    Surgeon:  She     Date of procedure:  03/18/2024     Pre-OP / PAC:   No - Not required for this site.    Location  [facility] - Per order.    Sedation   MAC/Deep Sedation  Location      Patient Reminders:   You will receive a call from a Nurse to review instructions and health history.  This assessment must be completed prior to your procedure.  Failure to complete the Nurse assessment may result in the procedure being cancelled.      On the day of your procedure, please designate an adult(s) who can drive you home stay with you for the next 24 hours. The medicines used in the exam will make you sleepy. You will not be able to drive.      You cannot take public transportation, ride share services, or non-medical taxi service without a responsible caregiver.  Medical transport services are allowed with the requirement that a responsible caregiver will receive you at your destination.  We require that drivers and caregivers are confirmed prior to your procedure.

## 2024-02-29 DIAGNOSIS — J45.30 MILD PERSISTENT ASTHMA WITHOUT COMPLICATION: ICD-10-CM

## 2024-02-29 RX ORDER — BUDESONIDE AND FORMOTEROL FUMARATE DIHYDRATE 160; 4.5 UG/1; UG/1
AEROSOL RESPIRATORY (INHALATION)
Qty: 30.6 G | Refills: 1 | Status: SHIPPED | OUTPATIENT
Start: 2024-02-29

## 2024-03-14 ENCOUNTER — TELEPHONE (OUTPATIENT)
Dept: GASTROENTEROLOGY | Facility: CLINIC | Age: 29
End: 2024-03-14
Payer: COMMERCIAL

## 2024-03-14 NOTE — TELEPHONE ENCOUNTER
Caller: No Call Made     Reason for Reschedule/Cancellation   (please be detailed, any staff messages or encounters to note?): PH RN transferred pt over to cancel procedure, but pt did not stay on line. RN called back to inform us that pt needs to cancel due to family emergency       Prior to reschedule please review:  Ordering Provider: MIHAELA ROSARIO   Sedation Determined: MAC  Does patient have any ASC Exclusions, please identify?: No      Notes on Cancelled Procedure:  Procedure: Upper and Lower Endoscopy [EGD and Colonoscopy]   Date: 03/18/2024  Location: [facility]; [address] [place] [address] , [place] [address]  Surgeon: KORIN      Rescheduled: No, Pt will call back at a later date to reschedule per PH RN        Did you cancel or rescheduled an EUS procedure? No.

## 2024-03-16 ENCOUNTER — HEALTH MAINTENANCE LETTER (OUTPATIENT)
Age: 29
End: 2024-03-16

## 2024-03-27 ENCOUNTER — LAB (OUTPATIENT)
Dept: LAB | Facility: CLINIC | Age: 29
End: 2024-03-27
Payer: COMMERCIAL

## 2024-03-27 DIAGNOSIS — Z79.899 HIGH RISK MEDICATION USE: Primary | ICD-10-CM

## 2024-03-27 LAB
ANION GAP SERPL CALCULATED.3IONS-SCNC: 11 MMOL/L (ref 7–15)
BUN SERPL-MCNC: 7.3 MG/DL (ref 6–20)
CALCIUM SERPL-MCNC: 8.7 MG/DL (ref 8.6–10)
CHLORIDE SERPL-SCNC: 106 MMOL/L (ref 98–107)
CHOLEST SERPL-MCNC: 112 MG/DL
CREAT SERPL-MCNC: 0.57 MG/DL (ref 0.51–0.95)
DEPRECATED HCO3 PLAS-SCNC: 25 MMOL/L (ref 22–29)
EGFRCR SERPLBLD CKD-EPI 2021: >90 ML/MIN/1.73M2
FASTING STATUS PATIENT QL REPORTED: YES
FERRITIN SERPL-MCNC: 7 NG/ML (ref 6–175)
GLUCOSE SERPL-MCNC: 97 MG/DL (ref 70–99)
HBA1C MFR BLD: 4.6 %
HDLC SERPL-MCNC: 30 MG/DL
IRON BINDING CAPACITY (ROCHE): 410 UG/DL (ref 240–430)
IRON SATN MFR SERPL: 3 % (ref 15–46)
IRON SERPL-MCNC: 11 UG/DL (ref 37–145)
LDLC SERPL CALC-MCNC: 59 MG/DL
MAGNESIUM SERPL-MCNC: 1.9 MG/DL (ref 1.7–2.3)
NONHDLC SERPL-MCNC: 82 MG/DL
POTASSIUM SERPL-SCNC: 3.8 MMOL/L (ref 3.4–5.3)
SODIUM SERPL-SCNC: 142 MMOL/L (ref 135–145)
T3FREE SERPL-MCNC: 4.1 PG/ML (ref 2–4.4)
T4 FREE SERPL-MCNC: 1.45 NG/DL (ref 0.9–1.7)
TRIGL SERPL-MCNC: 117 MG/DL
TSH SERPL DL<=0.005 MIU/L-ACNC: 0.46 UIU/ML (ref 0.3–4.2)
VIT B12 SERPL-MCNC: 546 PG/ML (ref 232–1245)

## 2024-03-27 PROCEDURE — 36415 COLL VENOUS BLD VENIPUNCTURE: CPT

## 2024-03-27 PROCEDURE — 83036 HEMOGLOBIN GLYCOSYLATED A1C: CPT

## 2024-03-27 PROCEDURE — 83550 IRON BINDING TEST: CPT

## 2024-03-27 PROCEDURE — 99000 SPECIMEN HANDLING OFFICE-LAB: CPT

## 2024-03-27 PROCEDURE — 84439 ASSAY OF FREE THYROXINE: CPT

## 2024-03-27 PROCEDURE — 80048 BASIC METABOLIC PNL TOTAL CA: CPT

## 2024-03-27 PROCEDURE — 82607 VITAMIN B-12: CPT

## 2024-03-27 PROCEDURE — 83540 ASSAY OF IRON: CPT

## 2024-03-27 PROCEDURE — 84630 ASSAY OF ZINC: CPT | Mod: 90

## 2024-03-27 PROCEDURE — 80061 LIPID PANEL: CPT

## 2024-03-27 PROCEDURE — 82728 ASSAY OF FERRITIN: CPT

## 2024-03-27 PROCEDURE — 84443 ASSAY THYROID STIM HORMONE: CPT

## 2024-03-27 PROCEDURE — 83735 ASSAY OF MAGNESIUM: CPT

## 2024-03-27 PROCEDURE — 84481 FREE ASSAY (FT-3): CPT

## 2024-03-27 PROCEDURE — 82306 VITAMIN D 25 HYDROXY: CPT

## 2024-03-29 LAB — ZINC SERPL-MCNC: 74.8 UG/DL

## 2024-03-30 LAB
DEPRECATED CALCIDIOL+CALCIFEROL SERPL-MC: <25 UG/L (ref 20–75)
VITAMIN D2 SERPL-MCNC: <5 UG/L
VITAMIN D3 SERPL-MCNC: 20 UG/L

## 2024-04-02 ENCOUNTER — MYC MEDICAL ADVICE (OUTPATIENT)
Dept: FAMILY MEDICINE | Facility: CLINIC | Age: 29
End: 2024-04-02
Payer: COMMERCIAL

## 2024-04-02 NOTE — TELEPHONE ENCOUNTER
Rescheduled: Yes,   Procedure: Upper and Lower Endoscopy [EGD and Colonoscopy]    Date: 5/9/24   Location: [facility]; [address] [place] [address] , [place] [address]   Surgeon: She   Sedation Level Scheduled  MAC ,  Reason for Sedation Level Location   Instructions updated and sent: Ayan     Does patient need PAC or Pre -Op Rescheduled? : No       Did you cancel or rescheduled an EUS procedure? No.

## 2024-04-03 ENCOUNTER — TELEPHONE (OUTPATIENT)
Dept: FAMILY MEDICINE | Facility: CLINIC | Age: 29
End: 2024-04-03
Payer: COMMERCIAL

## 2024-04-05 NOTE — TELEPHONE ENCOUNTER
Lance Tang MD P [facility] Pool  Caller: Unspecified (2 days ago,  1:54 PM)  She should continue her iron.  Next step is to do her upper and lower endoscopy to see if there is a source of bleeding in the stomach or colon.  She should let me know if her menses is heavy.  That would also cause low iron.  We need to identify if there is some source of iron and blood loss.          Mychart reply sent to patient on other encounter with Dr. Tang's response.

## 2024-04-17 NOTE — TELEPHONE ENCOUNTER
Extended Golytely Bowel Prep . Instructions were sent via [employer]. Bowel prep was sent 4/17/2024 to    [id number] - [place], [place] - [address]

## 2024-05-06 ENCOUNTER — TELEPHONE (OUTPATIENT)
Dept: FAMILY MEDICINE | Facility: CLINIC | Age: 29
End: 2024-05-06
Payer: COMMERCIAL

## 2024-05-06 NOTE — TELEPHONE ENCOUNTER
Phoned patient and informed her of documentation per Chantal Bowman MD as stated below.  Patient stated understanding.      Will forward to Dr. She MD team for review per Chantal Bowman MD.    Lorie Blakely RN

## 2024-05-06 NOTE — TELEPHONE ENCOUNTER
Below noted. Patient had no further questions or concerns.    Marissa Colon RN on 5/6/2024 at 2:47 PM

## 2024-05-06 NOTE — TELEPHONE ENCOUNTER
I haven't met the patient. But has anemia being worked up, so she should proceed if able. And Dr. Nowak's team is the only one who can tell her if she will need to reschedule. Stop now and we can check.  Please relay the messages to her and we can forward to She.  Chantal Bowman MD

## 2024-05-06 NOTE — TELEPHONE ENCOUNTER
Patient called with questions about her upcoming colonoscopy procedure that is scheduled for 5/9/24.     She states she was supposed to stop taking her iron supplement on Friday, but she forgot and has been taking it through this morning. She is wondering if she can still do the procedure as scheduled?    She states that Dr. Tang originally wanted her to do the colonoscopy to see if she was bleeding internally but now her craving for ice chips has stopped. She is wondering if her new provider (Dr. Bowman) wants her to do the colonoscopy now or wait until after her office visit on 5/20/24, to do blood testing first.    Bertha Rey, BSN, RN

## 2024-05-06 NOTE — TELEPHONE ENCOUNTER
Left message on machine to call back. Patient may proceed with colonoscopy as long as she has discontinued her iron supplement today.    Marissa Colon RN on 5/6/2024 at 2:37 PM

## 2024-05-09 ENCOUNTER — HOSPITAL ENCOUNTER (OUTPATIENT)
Facility: CLINIC | Age: 29
Discharge: HOME | End: 2024-05-09
Attending: SURGERY | Admitting: SURGERY
Payer: COMMERCIAL

## 2024-05-09 ENCOUNTER — ANESTHESIA (OUTPATIENT)
Dept: GASTROENTEROLOGY | Facility: CLINIC | Age: 29
End: 2024-05-09
Payer: COMMERCIAL

## 2024-05-09 ENCOUNTER — ANESTHESIA EVENT (OUTPATIENT)
Dept: GASTROENTEROLOGY | Facility: CLINIC | Age: 29
End: 2024-05-09
Payer: COMMERCIAL

## 2024-05-09 VITALS
HEIGHT: 64 IN | BODY MASS INDEX: 42 KG/M2 | DIASTOLIC BLOOD PRESSURE: 78 MMHG | SYSTOLIC BLOOD PRESSURE: 125 MMHG | OXYGEN SATURATION: 100 % | RESPIRATION RATE: 16 BRPM | TEMPERATURE: 97.9 F | WEIGHT: 246 LBS | HEART RATE: 82 BPM

## 2024-05-09 DIAGNOSIS — D50.9 IRON DEFICIENCY ANEMIA, UNSPECIFIED IRON DEFICIENCY ANEMIA TYPE: Primary | ICD-10-CM

## 2024-05-09 LAB
COLONOSCOPY: NORMAL
UPPER GI ENDOSCOPY: NORMAL

## 2024-05-09 PROCEDURE — 370N000017 HC ANESTHESIA TECHNICAL FEE, PER MIN: Performed by: SURGERY

## 2024-05-09 PROCEDURE — 250N000009 HC RX 250: Performed by: NURSE ANESTHETIST, CERTIFIED REGISTERED

## 2024-05-09 PROCEDURE — 250N000011 HC RX IP 250 OP 636: Performed by: NURSE ANESTHETIST, CERTIFIED REGISTERED

## 2024-05-09 PROCEDURE — 43235 EGD DIAGNOSTIC BRUSH WASH: CPT | Performed by: SURGERY

## 2024-05-09 PROCEDURE — 250N000011 HC RX IP 250 OP 636: Performed by: SURGERY

## 2024-05-09 PROCEDURE — 258N000003 HC RX IP 258 OP 636: Performed by: NURSE ANESTHETIST, CERTIFIED REGISTERED

## 2024-05-09 PROCEDURE — 45378 DIAGNOSTIC COLONOSCOPY: CPT | Performed by: SURGERY

## 2024-05-09 PROCEDURE — G0121 COLON CA SCRN NOT HI RSK IND: HCPCS | Performed by: SURGERY

## 2024-05-09 RX ORDER — PROCHLORPERAZINE MALEATE 5 MG
10 TABLET ORAL EVERY 6 HOURS PRN
Status: DISCONTINUED | OUTPATIENT
Start: 2024-05-09 | End: 2024-05-09 | Stop reason: HOSPADM

## 2024-05-09 RX ORDER — ONDANSETRON 2 MG/ML
4 INJECTION INTRAMUSCULAR; INTRAVENOUS EVERY 6 HOURS PRN
Status: DISCONTINUED | OUTPATIENT
Start: 2024-05-09 | End: 2024-05-09 | Stop reason: HOSPADM

## 2024-05-09 RX ORDER — LIDOCAINE 40 MG/G
CREAM TOPICAL
Status: DISCONTINUED | OUTPATIENT
Start: 2024-05-09 | End: 2024-05-09 | Stop reason: HOSPADM

## 2024-05-09 RX ORDER — PROPOFOL 10 MG/ML
INJECTION, EMULSION INTRAVENOUS CONTINUOUS PRN
Status: DISCONTINUED | OUTPATIENT
Start: 2024-05-09 | End: 2024-05-09

## 2024-05-09 RX ORDER — NALOXONE HYDROCHLORIDE 0.4 MG/ML
0.4 INJECTION, SOLUTION INTRAMUSCULAR; INTRAVENOUS; SUBCUTANEOUS
Status: DISCONTINUED | OUTPATIENT
Start: 2024-05-09 | End: 2024-05-09 | Stop reason: HOSPADM

## 2024-05-09 RX ORDER — ONDANSETRON 4 MG/1
4 TABLET, ORALLY DISINTEGRATING ORAL EVERY 6 HOURS PRN
Status: DISCONTINUED | OUTPATIENT
Start: 2024-05-09 | End: 2024-05-09 | Stop reason: HOSPADM

## 2024-05-09 RX ORDER — SODIUM CHLORIDE, SODIUM LACTATE, POTASSIUM CHLORIDE, CALCIUM CHLORIDE 600; 310; 30; 20 MG/100ML; MG/100ML; MG/100ML; MG/100ML
INJECTION, SOLUTION INTRAVENOUS CONTINUOUS
Status: DISCONTINUED | OUTPATIENT
Start: 2024-05-09 | End: 2024-05-09 | Stop reason: HOSPADM

## 2024-05-09 RX ORDER — NALOXONE HYDROCHLORIDE 0.4 MG/ML
0.2 INJECTION, SOLUTION INTRAMUSCULAR; INTRAVENOUS; SUBCUTANEOUS
Status: DISCONTINUED | OUTPATIENT
Start: 2024-05-09 | End: 2024-05-09 | Stop reason: HOSPADM

## 2024-05-09 RX ORDER — PROPOFOL 10 MG/ML
INJECTION, EMULSION INTRAVENOUS PRN
Status: DISCONTINUED | OUTPATIENT
Start: 2024-05-09 | End: 2024-05-09

## 2024-05-09 RX ORDER — ONDANSETRON 2 MG/ML
4 INJECTION INTRAMUSCULAR; INTRAVENOUS
Status: COMPLETED | OUTPATIENT
Start: 2024-05-09 | End: 2024-05-09

## 2024-05-09 RX ORDER — DEXAMETHASONE SODIUM PHOSPHATE 10 MG/ML
4 INJECTION, SOLUTION INTRAMUSCULAR; INTRAVENOUS
Status: DISCONTINUED | OUTPATIENT
Start: 2024-05-09 | End: 2024-05-09 | Stop reason: HOSPADM

## 2024-05-09 RX ORDER — NALOXONE HYDROCHLORIDE 0.4 MG/ML
0.1 INJECTION, SOLUTION INTRAMUSCULAR; INTRAVENOUS; SUBCUTANEOUS
Status: DISCONTINUED | OUTPATIENT
Start: 2024-05-09 | End: 2024-05-09 | Stop reason: HOSPADM

## 2024-05-09 RX ORDER — ONDANSETRON 4 MG/1
4 TABLET, ORALLY DISINTEGRATING ORAL EVERY 30 MIN PRN
Status: DISCONTINUED | OUTPATIENT
Start: 2024-05-09 | End: 2024-05-09 | Stop reason: HOSPADM

## 2024-05-09 RX ORDER — FLUMAZENIL 0.1 MG/ML
0.2 INJECTION, SOLUTION INTRAVENOUS
Status: DISCONTINUED | OUTPATIENT
Start: 2024-05-09 | End: 2024-05-09 | Stop reason: HOSPADM

## 2024-05-09 RX ORDER — ONDANSETRON 2 MG/ML
4 INJECTION INTRAMUSCULAR; INTRAVENOUS EVERY 30 MIN PRN
Status: DISCONTINUED | OUTPATIENT
Start: 2024-05-09 | End: 2024-05-09 | Stop reason: HOSPADM

## 2024-05-09 RX ORDER — LIDOCAINE HYDROCHLORIDE 20 MG/ML
INJECTION, SOLUTION INFILTRATION; PERINEURAL PRN
Status: DISCONTINUED | OUTPATIENT
Start: 2024-05-09 | End: 2024-05-09

## 2024-05-09 RX ADMIN — PROPOFOL 50 MG: 10 INJECTION, EMULSION INTRAVENOUS at 09:07

## 2024-05-09 RX ADMIN — PROPOFOL 100 MG: 10 INJECTION, EMULSION INTRAVENOUS at 08:58

## 2024-05-09 RX ADMIN — SODIUM CHLORIDE, POTASSIUM CHLORIDE, SODIUM LACTATE AND CALCIUM CHLORIDE 10 ML/HR: 600; 310; 30; 20 INJECTION, SOLUTION INTRAVENOUS at 08:42

## 2024-05-09 RX ADMIN — PROPOFOL 150 MCG/KG/MIN: 10 INJECTION, EMULSION INTRAVENOUS at 08:57

## 2024-05-09 RX ADMIN — LIDOCAINE HYDROCHLORIDE 50 MG: 20 INJECTION, SOLUTION INFILTRATION; PERINEURAL at 08:58

## 2024-05-09 RX ADMIN — ONDANSETRON 4 MG: 2 INJECTION INTRAMUSCULAR; INTRAVENOUS at 09:00

## 2024-05-09 RX ADMIN — PROPOFOL 50 MG: 10 INJECTION, EMULSION INTRAVENOUS at 08:59

## 2024-05-09 ASSESSMENT — ACTIVITIES OF DAILY LIVING (ADL): ADLS_ACUITY_SCORE: 35

## 2024-05-09 NOTE — ANESTHESIA PREPROCEDURE EVALUATION
Anesthesia Pre-Procedure Evaluation    Patient: Kena Sorto   MRN: [id number] : 1995        Procedure : Procedure(s):  Colonoscopy  Esophagoscopy, gastroscopy, duodenoscopy (EGD), combined          Past Medical History:   Diagnosis Date    Chronic adenoiditis 3/3/2010    Encounter for insertion of mirena IUD 10/2/2018    Remove by 10/2/2023    Hypertrophy of nasal turbinates 3/3/2010    Iron deficiency anemia, unspecified     TREATED WITH IRON SUPPLEMENTS    Mild intermittent asthma     Plantar fasciitis 3/15/2012    Pneumonia, organism unspecified(486)     Pneumonia    Post partum depression 2018    Seasonal allergies       Past Surgical History:   Procedure Laterality Date    HC THERAPUTIC FRACTURE INFER TURBINATE  03/30/10    TONSILLECTOMY & ADENOIDECTOMY  03/30/10    turbinoplasty      Allergies   Allergen Reactions    Keflex [Cephalexin Hcl] Hives    Penicillins Other (See Comments) and Swelling     Throat swelling      Prozac [Fluoxetine] Nausea     Patient requested a change      Social History     Tobacco Use    Smoking status: Never    Smokeless tobacco: Never    Tobacco comments:     no smokers in the household   Substance Use Topics    Alcohol use: No      Wt Readings from Last 1 Encounters:   24 111.7 kg (246 lb 3.2 oz)        Anesthesia Evaluation            ROS/MED HX  ENT/Pulmonary:     (+)                     Intermittent, asthma                  Neurologic:  - neg neurologic ROS     Cardiovascular:     (+) Dyslipidemia hypertension- -   -  - -                                      METS/Exercise Tolerance:     Hematologic:  - neg hematologic  ROS     Musculoskeletal:  - neg musculoskeletal ROS     GI/Hepatic:     (+) GERD,                   Renal/Genitourinary:  - neg Renal ROS     Endo:     (+)               Obesity,       Psychiatric/Substance Use:     (+) psychiatric history anxiety and depression       Infectious Disease:  - neg infectious disease ROS     Malignancy:  - neg  malignancy ROS     Other:  - neg other ROS          Physical Exam    Airway  airway exam normal      Mallampati: II   TM distance: > 3 FB   Neck ROM: full   Mouth opening: > 3 cm    Respiratory Devices and Support         Dental           Cardiovascular   cardiovascular exam normal       Rhythm and rate: regular and normal     Pulmonary   pulmonary exam normal        breath sounds clear to auscultation           OUTSIDE LABS:  CBC:   Lab Results   Component Value Date    WBC 5.4 02/07/2024    WBC 5.4 02/01/2024    HGB 8.4 (L) 02/07/2024    HGB 7.6 (L) 02/01/2024    HGB 7.6 (L) 02/01/2024    HCT 32.4 (L) 02/07/2024    HCT 30.2 (L) 02/01/2024     02/07/2024     02/01/2024     BMP:   Lab Results   Component Value Date     03/27/2024     02/07/2024    POTASSIUM 3.8 03/27/2024    POTASSIUM 4.1 02/07/2024    CHLORIDE 106 03/27/2024    CHLORIDE 102 02/07/2024    CO2 25 03/27/2024    CO2 25 02/07/2024    BUN 7.3 03/27/2024    BUN 9.5 02/07/2024    CR 0.57 03/27/2024    CR 0.62 02/07/2024    GLC 97 03/27/2024    GLC 87 02/07/2024     COAGS:   Lab Results   Component Value Date    PTT 25 10/31/2001    INR <1.0 10/31/2001     POC:   Lab Results   Component Value Date    HCG Negative 07/05/2022    HCGS  12/15/2010     Negative   This test provides a presumptive diagnosis of pregnancy or non-pregnancy. A   confirmed pregnancy diagnosis should only be made by a physician after all   clinical and laboratory findings have been evaluated.     HEPATIC:   Lab Results   Component Value Date    ALBUMIN 4.3 02/07/2024    PROTTOTAL 7.3 02/07/2024    ALT 16 02/07/2024    AST 17 02/07/2024    ALKPHOS 56 02/07/2024    BILITOTAL 0.3 02/07/2024     OTHER:   Lab Results   Component Value Date    PH 8.0 (H) 04/09/2002    LACT 0.5 (L) 07/29/2018    A1C 4.6 03/27/2024    SUMMER 8.7 03/27/2024    MAG 1.9 03/27/2024    TSH 0.46 03/27/2024    T4 1.45 03/27/2024    CRP 29.2 (H) 07/29/2018    SED 4 10/31/2001       Anesthesia  Plan    ASA Status:  2    NPO Status:  NPO Appropriate    Anesthesia Type: MAC.   Induction: Intravenous, Propofol.   Maintenance: TIVA.        Consents    Anesthesia Plan(s) and associated risks, benefits, and realistic alternatives discussed. Questions answered and patient/representative(s) expressed understanding.     - Discussed:     - Discussed with:  Patient       Use of blood products discussed: No .     Postoperative Care       PONV prophylaxis: Background Propofol Infusion     Comments:    Other Comments: The risks and benefits of anesthesia, and the alternatives where applicable, have been discussed with the patient, and they wish to proceed.              HILDA Casarez CRNA    I have reviewed the pertinent notes and labs in the chart from the past 30 days and (re)examined the patient.  Any updates or changes from those notes are reflected in this note.

## 2024-05-09 NOTE — ANESTHESIA POSTPROCEDURE EVALUATION
Patient: Kena Sorto    Procedure: Procedure(s):  Colonoscopy  Esophagoscopy, gastroscopy, duodenoscopy (EGD), combined       Anesthesia Type:  MAC    Note:  Disposition: Outpatient   Postop Pain Control: Uneventful            Sign Out: Well controlled pain   PONV: No   Neuro/Psych: Uneventful            Sign Out: Acceptable/Baseline neuro status   Airway/Respiratory: Uneventful            Sign Out: Acceptable/Baseline resp. status   CV/Hemodynamics: Uneventful            Sign Out: Acceptable CV status   Other NRE: NONE   DID A NON-ROUTINE EVENT OCCUR? No    Event details/Postop Comments:  Pt was happy with anesthesia care.  No complications.  I will follow up with the pt if needed.           Last vitals:  Vitals Value Taken Time   /72 05/09/24 0921   Temp     Pulse 86 05/09/24 0917   Resp 16 05/09/24 0921   SpO2 99 % 05/09/24 0923   Vitals shown include unfiled device data.    Electronically Signed By: HILDA Casarez CRNA  May 9, 2024  9:24 AM

## 2024-05-09 NOTE — H&P
Patient seen for Endoscopy    HPI:  Patient is a 29 year old female here for endoscopy. Not taking blood thinning medications. No MI or CVA history. No issues with previous sedation. No recent acute illness.    Review Of Systems    Skin: negative  Ears/Nose/Throat: negative  Respiratory: No shortness of breath, dyspnea on exertion, cough, or hemoptysis  Cardiovascular: negative  Gastrointestinal: negative  Genitourinary: negative  Musculoskeletal: negative  Neurologic: negative  Hematologic/Lymphatic/Immunologic: negative  Endocrine: negative      Past Medical History:   Diagnosis Date    Chronic adenoiditis 3/3/2010    Encounter for insertion of mirena IUD 10/2/2018    Remove by 10/2/2023    Hypertrophy of nasal turbinates 3/3/2010    Iron deficiency anemia, unspecified     TREATED WITH IRON SUPPLEMENTS    Mild intermittent asthma     Plantar fasciitis 3/15/2012    Pneumonia, organism unspecified(486)     Pneumonia    Post partum depression 7/5/2018    Seasonal allergies        Past Surgical History:   Procedure Laterality Date    HC THERAPUTIC FRACTURE INFER TURBINATE  03/30/10    TONSILLECTOMY & ADENOIDECTOMY  03/30/10    turbinoplasty       Family History   Problem Relation Age of Onset    Gastrointestinal Disease Father     Heart Disease Father     Back Pain Father     Diabetes Mother     Hypertension Mother     Hyperlipidemia Mother     Anxiety Disorder Sister     Depression Sister     Hearing Loss Sister         congenital    Coronary Artery Disease Maternal Grandmother     Cerebrovascular Disease Maternal Grandmother     Diabetes Maternal Grandfather         Type II    Cerebrovascular Disease Maternal Grandfather     Cancer Paternal Grandmother         lung (she was a smoker)    Coronary Artery Disease Paternal Grandfather         aortic aneurysm    Food Allergy Daughter 3        peanut    Allergies Daughter     Asthma Daughter        Social History     Socioeconomic History    Marital status: Single      "Spouse name: Not on file    Number of children: Not on file    Years of education: Not on file    Highest education level: Not on file   Occupational History    Not on file   Tobacco Use    Smoking status: Never    Smokeless tobacco: Never    Tobacco comments:     no smokers in the household   Vaping Use    Vaping status: Never Used   Substance and Sexual Activity    Alcohol use: No    Drug use: No    Sexual activity: Yes     Partners: Male   Other Topics Concern    Not on file   Social History Narrative    1/2018  Lives in [place] with  Vaughn and daughter, Dinah.  Vaughn smokes.  Has indoor cats.  Aware of toxoplasmosis precautions.  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  No concerns about domestic violence.     Social Determinants of Health     Financial Resource Strain: Not on file   Food Insecurity: Not on file   Transportation Needs: Not on file   Physical Activity: Not on file   Stress: Not on file   Social Connections: Not on file   Interpersonal Safety: Not on file   Housing Stability: Not on file       No current outpatient medications on file.       Medications and history reviewed    Physical exam:  Vitals: /87   Pulse 83   Temp 97.9  F (36.6  C) (Temporal)   Resp 18   Ht 1.626 m (5' 4\")   Wt 111.6 kg (246 lb)   SpO2 99%   BMI 42.23 kg/m    BMI= Body mass index is 42.23 kg/m .    Constitutional: Healthy, alert, non-distressed   Head: Normo-cephalic, atraumatic, no lesions, masses or tenderness   Cardiovascular: RRR, no new murmurs, +S1, +S2 heart sounds, no clicks, rubs or gallops   Respiratory: CTAB, no rales, rhonchi or wheezing, equal chest rise, good respiratory effort   Gastrointestinal: Soft, non-tender, non distended, no rebound rigidity or guarding, no masses or hernias palpated   : Deferred  Musculoskeletal: Moves all extremities, normal  strength, no deformities noted   Skin: No suspicious lesions or rashes   Psychiatric: Mentation appears normal, affect appropriate "   Hematologic/Lymphatic/Immunologic: Normal cervical and supraclavicular lymph nodes   Patient able to get up on table without difficulty.    Labs show:  No results found for this or any previous visit (from the past 24 hour(s)).    Assessment: Endoscopy  Plan: Pt cleared for anesthesia for proposed procedure.    Al Nowak DO

## 2024-05-09 NOTE — ANESTHESIA CARE TRANSFER NOTE
Patient: Kena Sorto    Procedure: Procedure(s):  Colonoscopy  Esophagoscopy, gastroscopy, duodenoscopy (EGD), combined       Diagnosis: Other iron deficiency anemia [D50.8]  Diagnosis Additional Information: No value filed.    Anesthesia Type:   MAC     Note:    Oropharynx: oropharynx clear of all foreign objects and spontaneously breathing  Level of Consciousness: drowsy  Oxygen Supplementation: face mask    Independent Airway: airway patency satisfactory and stable  Dentition: dentition unchanged  Vital Signs Stable: post-procedure vital signs reviewed and stable  Report to RN Given: handoff report given  Patient transferred to: Phase II    Handoff Report: Identifed the Patient, Identified the Reponsible Provider, Reviewed the pertinent medical history, Discussed the surgical course, Reviewed Intra-OP anesthesia mangement and issues during anesthesia, Set expectations for post-procedure period and Allowed opportunity for questions and acknowledgement of understanding      Vitals:  Vitals Value Taken Time   BP     Temp     Pulse     Resp     SpO2         Electronically Signed By: HILDA Casarez CRNA  May 9, 2024  9:15 AM

## 2024-05-09 NOTE — DISCHARGE INSTRUCTIONS
[facility]    Home Care Following Endoscopy          Activity:  You have just undergone an endoscopic procedure usually performed with conscious sedation.  Do not work or operate machinery (including a car) for at least 12 hours.    I encourage you to walk and attempt to pass this air as soon as possible.    Diet:  Return to the diet you were on before your procedure but eat lightly for the first 12-24 hours.  Drink plenty of water.  Resume any regular medications unless otherwise advised by your physician.  Please begin any new medication prescribed as a result of your procedure as directed by your physician.   If you had any biopsy or polyp removed please refrain from aspirin or aspirin products for 2 days.  If on Coumadin please restart as instructed by your physician.   Pain:  You may take Tylenol as needed for pain.  Expected Recovery:  You can expect some mild abdominal fullness and/or discomfort due to the air used to inflate your intestinal tract. It is also normal to have a mild sore throat after upper endoscopy.    Call Your Physician if You Have:  After Upper Endoscopy:  Shoulder, back or chest pain.  Difficulty breathing or swallowing.  Vomiting blood.  After Colonoscopy:  Worsening persisting abdominal pain which is worse with activity.  Fevers (>101 degrees F), chills or shakes.  Passage of continued blood with bowel movements.     Any questions or concerns about your recovery, please call [phone] or after hours [phone] ([phone]) Nurse Advice Line.    Follow-up Care:  IF YOU HAD  polyps/biopsy tissue sample(s) removed.  The polyps/biopsy tissue sample(s) will be sent to pathology.    You should receive letter in your My Chart from with your results within 1-2 weeks. If you do not participate in My Chart a physical letter will come in the mail in 2-3 weeks.  Please call if you have not received a notification of your results.  If asked to return to clinic please make an  appointment 1 week after your procedure.  Call [phone].

## 2024-05-20 ENCOUNTER — OFFICE VISIT (OUTPATIENT)
Dept: FAMILY MEDICINE | Facility: OTHER | Age: 29
End: 2024-05-20
Payer: COMMERCIAL

## 2024-05-20 VITALS
SYSTOLIC BLOOD PRESSURE: 118 MMHG | TEMPERATURE: 98.4 F | OXYGEN SATURATION: 98 % | HEIGHT: 64 IN | DIASTOLIC BLOOD PRESSURE: 64 MMHG | BODY MASS INDEX: 43.02 KG/M2 | WEIGHT: 252 LBS | HEART RATE: 78 BPM | RESPIRATION RATE: 16 BRPM

## 2024-05-20 DIAGNOSIS — R06.83 SNORING: ICD-10-CM

## 2024-05-20 DIAGNOSIS — D50.0 IRON DEFICIENCY ANEMIA DUE TO CHRONIC BLOOD LOSS: Primary | ICD-10-CM

## 2024-05-20 DIAGNOSIS — R53.82 CHRONIC FATIGUE: ICD-10-CM

## 2024-05-20 DIAGNOSIS — E66.01 SEVERE OBESITY WITH BODY MASS INDEX (BMI) OF 35.0 TO 39.9 WITH SERIOUS COMORBIDITY (H): ICD-10-CM

## 2024-05-20 DIAGNOSIS — F33.0 MAJOR DEPRESSIVE DISORDER, RECURRENT EPISODE, MILD (H): ICD-10-CM

## 2024-05-20 DIAGNOSIS — J45.30 MILD PERSISTENT ASTHMA WITHOUT COMPLICATION: ICD-10-CM

## 2024-05-20 PROBLEM — J45.909 ASTHMA: Status: ACTIVE | Noted: 2023-09-11

## 2024-05-20 PROCEDURE — 99214 OFFICE O/P EST MOD 30 MIN: CPT | Performed by: FAMILY MEDICINE

## 2024-05-20 RX ORDER — CLONIDINE HYDROCHLORIDE 0.1 MG/1
0.1 TABLET ORAL 2 TIMES DAILY
COMMUNITY
Start: 2024-02-28 | End: 2024-05-20

## 2024-05-20 RX ORDER — ERGOCALCIFEROL 1.25 MG/1
50000 CAPSULE, LIQUID FILLED ORAL WEEKLY
COMMUNITY
Start: 2024-05-08 | End: 2024-07-30

## 2024-05-20 RX ORDER — ALBUTEROL SULFATE 90 UG/1
2 AEROSOL, METERED RESPIRATORY (INHALATION) EVERY 6 HOURS PRN
Qty: 18 G | Refills: 1 | Status: SHIPPED | OUTPATIENT
Start: 2024-05-20

## 2024-05-20 ASSESSMENT — ANXIETY QUESTIONNAIRES
IF YOU CHECKED OFF ANY PROBLEMS ON THIS QUESTIONNAIRE, HOW DIFFICULT HAVE THESE PROBLEMS MADE IT FOR YOU TO DO YOUR WORK, TAKE CARE OF THINGS AT HOME, OR GET ALONG WITH OTHER PEOPLE: SOMEWHAT DIFFICULT
5. BEING SO RESTLESS THAT IT IS HARD TO SIT STILL: NOT AT ALL
1. FEELING NERVOUS, ANXIOUS, OR ON EDGE: SEVERAL DAYS
4. TROUBLE RELAXING: NOT AT ALL
GAD7 TOTAL SCORE: 5
2. NOT BEING ABLE TO STOP OR CONTROL WORRYING: SEVERAL DAYS
3. WORRYING TOO MUCH ABOUT DIFFERENT THINGS: SEVERAL DAYS
GAD7 TOTAL SCORE: 5
6. BECOMING EASILY ANNOYED OR IRRITABLE: MORE THAN HALF THE DAYS
7. FEELING AFRAID AS IF SOMETHING AWFUL MIGHT HAPPEN: NOT AT ALL

## 2024-05-20 ASSESSMENT — PAIN SCALES - GENERAL: PAINLEVEL: NO PAIN (0)

## 2024-05-20 NOTE — PROGRESS NOTES
Assessment & Plan         ICD-10-CM    1. Iron deficiency anemia due to chronic blood loss  D50.0 CBC with platelets     Iron and iron binding capacity     Vitamin D Deficiency      2. Snoring  R06.83 Adult Sleep Eval & Management  Referral      3. Chronic fatigue  R53.82 Adult Sleep Eval & Management  Referral      4. Mild persistent asthma without complication  J45.30 albuterol (PROAIR HFA/PROVENTIL HFA/VENTOLIN HFA) 108 (90 Base) MCG/ACT inhaler      5. Major depressive disorder, recurrent episode, mild (H24)  F33.0       6. Severe obesity with body mass index (BMI) of 35.0 to 39.9 with serious comorbidity (H)  E66.01           Patient receives care with [facility] for mental health and discovered a vitamin D and iron deficiency anemia.  She would like us to manage these and we did offer to repeat labs though she has not finished her 8-week course of the vitamin D supplementation and so we did ask her to do her labs at the last week after her final dose.  We can manage her iron deficiency labs at this time as well as she has been feeling better.  Patient has had evaluation for this and although she had not been having heavy menstrual cycles she has returned the since starting the iron back.  As her anemia improves she is finding cycling and ovulation more common and likely this was a nutritional deficit that had suppressed her cycles.  As she is interested in getting pregnant in the future we did talk about timing.  She is not interested in the near future and is thinking about trying to suppress her menstrual cycles as she recovers if necessary so that she does not lose as much blood again we did talk about the risk and benefits of Mirena she does not well tolerate her birth control previously.  This may make it longer before she is able to return to attempts at pregnancy but she has been thinking more long-term on this than in the short run.  She would like to see her self  "improve on her overall weight and health before she was attempting pregnancy.  She is aware of that the iron high foods and we gave her another list today as well as the intake necessary to get back on track but at this time has been taking iron supplementation to make sure she gets enough and is already started increasing her hemoglobin.  As these improve her mood has improved and she is hopeful that this will help her get back into regular exercise program and maintain her weight as well.    No LOS data to display   Time spent by me doing chart review, history and exam, documentation and further activities per the note    Chantal Bowman MD           BMI  Estimated body mass index is 43.18 kg/m  as calculated from the following:    Height as of this encounter: 1.627 m (5' 4.06\").    Weight as of this encounter: 114.3 kg (252 lb).   Weight management plan: Discussed healthy diet and exercise guidelines          Moses Escudero is a 29 year old, presenting for the following health issues:  Establish Care      5/20/2024     8:40 AM   Additional Questions   Roomed by neil   Accompanied by alone         5/20/2024     8:40 AM   Patient Reported Additional Medications   Patient reports taking the following new medications b-12, biotin     History of Present Illness       Reason for visit:  Iron level and new primary    She eats 0-1 servings of fruits and vegetables daily.She consumes 2 sweetened beverage(s) daily.She exercises with enough effort to increase her heart rate 9 or less minutes per day.  She exercises with enough effort to increase her heart rate 3 or less days per week.   She is taking medications regularly.           Objective    /64   Pulse 78   Temp 98.4  F (36.9  C) (Temporal)   Resp 16   Ht 1.627 m (5' 4.06\")   Wt 114.3 kg (252 lb)   LMP 05/10/2024 (Exact Date)   SpO2 98%   Breastfeeding No   BMI 43.18 kg/m    Body mass index is 43.18 kg/m .  Physical Exam   GENERAL: alert and no " distress  RESP: lungs clear to auscultation - no rales, rhonchi or wheezes  CV: regular rate and rhythm, normal S1 S2, no S3 or S4, no murmur, click or rub, no peripheral edema  MS: no gross musculoskeletal defects noted, no edema  SKIN: no suspicious lesions or rashes  NEURO: Normal strength and tone, mentation intact and speech normal  PSYCH: mentation appears normal, affect normal/bright            Signed Electronically by: Chantal Bowman MD, MD

## 2024-06-12 ENCOUNTER — LAB (OUTPATIENT)
Dept: LAB | Facility: CLINIC | Age: 29
End: 2024-06-12
Payer: COMMERCIAL

## 2024-06-12 DIAGNOSIS — D50.0 IRON DEFICIENCY ANEMIA DUE TO CHRONIC BLOOD LOSS: ICD-10-CM

## 2024-06-12 LAB
ERYTHROCYTE [DISTWIDTH] IN BLOOD BY AUTOMATED COUNT: 19.8 % (ref 10–15)
HCT VFR BLD AUTO: 41 % (ref 35–47)
HGB BLD-MCNC: 12.9 G/DL (ref 11.7–15.7)
IRON BINDING CAPACITY (ROCHE): 426 UG/DL (ref 240–430)
IRON SATN MFR SERPL: 66 % (ref 15–46)
IRON SERPL-MCNC: 281 UG/DL (ref 37–145)
MCH RBC QN AUTO: 23.2 PG (ref 26.5–33)
MCHC RBC AUTO-ENTMCNC: 31.5 G/DL (ref 31.5–36.5)
MCV RBC AUTO: 74 FL (ref 78–100)
PLATELET # BLD AUTO: 311 10E3/UL (ref 150–450)
RBC # BLD AUTO: 5.56 10E6/UL (ref 3.8–5.2)
VIT D+METAB SERPL-MCNC: 35 NG/ML (ref 20–50)
WBC # BLD AUTO: 6.7 10E3/UL (ref 4–11)

## 2024-06-12 PROCEDURE — 85027 COMPLETE CBC AUTOMATED: CPT

## 2024-06-12 PROCEDURE — 83540 ASSAY OF IRON: CPT

## 2024-06-12 PROCEDURE — 83550 IRON BINDING TEST: CPT

## 2024-06-12 PROCEDURE — 82306 VITAMIN D 25 HYDROXY: CPT

## 2024-06-12 PROCEDURE — 36415 COLL VENOUS BLD VENIPUNCTURE: CPT

## 2024-07-29 ENCOUNTER — PATIENT OUTREACH (OUTPATIENT)
Dept: CARE COORDINATION | Facility: CLINIC | Age: 29
End: 2024-07-29
Payer: COMMERCIAL

## 2024-07-30 ENCOUNTER — OFFICE VISIT (OUTPATIENT)
Dept: FAMILY MEDICINE | Facility: OTHER | Age: 29
End: 2024-07-30
Payer: COMMERCIAL

## 2024-07-30 ENCOUNTER — NURSE TRIAGE (OUTPATIENT)
Dept: FAMILY MEDICINE | Facility: OTHER | Age: 29
End: 2024-07-30

## 2024-07-30 ENCOUNTER — MYC MEDICAL ADVICE (OUTPATIENT)
Dept: FAMILY MEDICINE | Facility: OTHER | Age: 29
End: 2024-07-30

## 2024-07-30 VITALS
SYSTOLIC BLOOD PRESSURE: 110 MMHG | DIASTOLIC BLOOD PRESSURE: 70 MMHG | BODY MASS INDEX: 43.54 KG/M2 | WEIGHT: 255 LBS | RESPIRATION RATE: 16 BRPM | HEART RATE: 84 BPM | OXYGEN SATURATION: 98 % | TEMPERATURE: 97.7 F | HEIGHT: 64 IN

## 2024-07-30 DIAGNOSIS — I83.92 SPIDER VEIN OF LEFT LOWER EXTREMITY: Primary | ICD-10-CM

## 2024-07-30 PROBLEM — O14.13 PRE-ECLAMPSIA, SEVERE, ANTEPARTUM, THIRD TRIMESTER: Status: RESOLVED | Noted: 2022-03-22 | Resolved: 2024-07-30

## 2024-07-30 PROBLEM — J45.909 ASTHMA: Status: RESOLVED | Noted: 2023-09-11 | Resolved: 2024-07-30

## 2024-07-30 PROBLEM — O09.90 HIGH-RISK PREGNANCY: Status: RESOLVED | Noted: 2021-10-06 | Resolved: 2024-07-30

## 2024-07-30 PROCEDURE — 99212 OFFICE O/P EST SF 10 MIN: CPT | Performed by: FAMILY MEDICINE

## 2024-07-30 RX ORDER — PROPRANOLOL HYDROCHLORIDE 10 MG/1
TABLET ORAL
COMMUNITY
Start: 2024-07-18

## 2024-07-30 ASSESSMENT — PAIN SCALES - GENERAL: PAINLEVEL: NO PAIN (0)

## 2024-07-30 ASSESSMENT — ANXIETY QUESTIONNAIRES
2. NOT BEING ABLE TO STOP OR CONTROL WORRYING: SEVERAL DAYS
7. FEELING AFRAID AS IF SOMETHING AWFUL MIGHT HAPPEN: NOT AT ALL
IF YOU CHECKED OFF ANY PROBLEMS ON THIS QUESTIONNAIRE, HOW DIFFICULT HAVE THESE PROBLEMS MADE IT FOR YOU TO DO YOUR WORK, TAKE CARE OF THINGS AT HOME, OR GET ALONG WITH OTHER PEOPLE: SOMEWHAT DIFFICULT
6. BECOMING EASILY ANNOYED OR IRRITABLE: SEVERAL DAYS
GAD7 TOTAL SCORE: 6
4. TROUBLE RELAXING: SEVERAL DAYS
1. FEELING NERVOUS, ANXIOUS, OR ON EDGE: SEVERAL DAYS
GAD7 TOTAL SCORE: 6
GAD7 TOTAL SCORE: 6
7. FEELING AFRAID AS IF SOMETHING AWFUL MIGHT HAPPEN: NOT AT ALL
8. IF YOU CHECKED OFF ANY PROBLEMS, HOW DIFFICULT HAVE THESE MADE IT FOR YOU TO DO YOUR WORK, TAKE CARE OF THINGS AT HOME, OR GET ALONG WITH OTHER PEOPLE?: SOMEWHAT DIFFICULT
3. WORRYING TOO MUCH ABOUT DIFFERENT THINGS: SEVERAL DAYS
5. BEING SO RESTLESS THAT IT IS HARD TO SIT STILL: SEVERAL DAYS

## 2024-07-30 ASSESSMENT — ASTHMA QUESTIONNAIRES
ACT_TOTALSCORE: 25
QUESTION_3 LAST FOUR WEEKS HOW OFTEN DID YOUR ASTHMA SYMPTOMS (WHEEZING, COUGHING, SHORTNESS OF BREATH, CHEST TIGHTNESS OR PAIN) WAKE YOU UP AT NIGHT OR EARLIER THAN USUAL IN THE MORNING: NOT AT ALL
ACT_TOTALSCORE: 25
QUESTION_5 LAST FOUR WEEKS HOW WOULD YOU RATE YOUR ASTHMA CONTROL: COMPLETELY CONTROLLED
QUESTION_2 LAST FOUR WEEKS HOW OFTEN HAVE YOU HAD SHORTNESS OF BREATH: NOT AT ALL
QUESTION_4 LAST FOUR WEEKS HOW OFTEN HAVE YOU USED YOUR RESCUE INHALER OR NEBULIZER MEDICATION (SUCH AS ALBUTEROL): NOT AT ALL
QUESTION_1 LAST FOUR WEEKS HOW MUCH OF THE TIME DID YOUR ASTHMA KEEP YOU FROM GETTING AS MUCH DONE AT WORK, SCHOOL OR AT HOME: NONE OF THE TIME

## 2024-07-30 ASSESSMENT — PATIENT HEALTH QUESTIONNAIRE - PHQ9
10. IF YOU CHECKED OFF ANY PROBLEMS, HOW DIFFICULT HAVE THESE PROBLEMS MADE IT FOR YOU TO DO YOUR WORK, TAKE CARE OF THINGS AT HOME, OR GET ALONG WITH OTHER PEOPLE: SOMEWHAT DIFFICULT
SUM OF ALL RESPONSES TO PHQ QUESTIONS 1-9: 5
SUM OF ALL RESPONSES TO PHQ QUESTIONS 1-9: 5

## 2024-07-30 NOTE — TELEPHONE ENCOUNTER
S-(situation): Bruise    B-(background): Patient states she has a bruise on her left lower calf above her achilles. Bruise has been present for months and has showed no signs of improvement. Patient denies any known injury to the area. Patient states there is very mild swelling to the area and noticed the area was warm to touch after itching the area.     A-(assessment): Per RN protocol, patient should be seen within 3 days.     R-(recommendations): RN scheduled patient for clinic appointment today.     Reason for Disposition   After 10 days and bruise not fading    Additional Information   Negative: Bruise(s) of forehead or head   Negative: Bruise(s) of face or jaw   Negative: Patient has a concerning injury (e.g., chest, neck, leg)   Negative: Post-operative bruising   Negative: Shock suspected (e.g., cold/pale/clammy skin, too weak to stand, low BP, rapid pulse)   Negative: Fever and purple or blood-colored spots or dots   Negative: Sounds like a life-threatening emergency to the triager   Negative: Dizziness or lightheadedness   Negative: Less than 5 unxplained bruises now, NOT caused by an injury   Negative: Taking Coumadin (warfarin) or other strong blood thinner, or known bleeding disorder (e.g., thrombocytopenia)   Negative: Suspicious history for the injury   Negative: Minor bruising at site of heparin injection (e.g., heparin, Fragmin, Innohep, Lovenox)   Negative: SEVERE pain and not improved 2 hours after pain medicine/ice packs   Negative: Purple or blood-colored spots or dots that are not from injury or friction (no fever and sounds well to triager)   Negative: 5 or more bruises now, NOT caused by an injury   Negative: Raised bruise and size > 2 inches (5 cm) and getting bigger   Negative: Bruise on head, face, chest, or abdomen and taking Coumadin (warfarin) or other strong blood thinner, or known bleeding disorder (e.g., thrombocytopenia)   Negative: Unexplained bleeding from another site (e.g.,  gums, nose, urine) as well   Negative: Patient sounds very sick or weak to the triager    Protocols used: Bruises-A-OH

## 2024-08-01 ENCOUNTER — LAB REQUISITION (OUTPATIENT)
Dept: LAB | Facility: CLINIC | Age: 29
End: 2024-08-01
Payer: COMMERCIAL

## 2024-08-01 DIAGNOSIS — B37.32 CHRONIC CANDIDIASIS OF VULVA AND VAGINA: ICD-10-CM

## 2024-08-01 PROCEDURE — 87563 M. GENITALIUM AMP PROBE: CPT | Mod: ORL | Performed by: PHYSICIAN ASSISTANT

## 2024-08-05 LAB
M GENITALIUM DNA SPEC QL NAA+PROBE: NOT DETECTED
M HOMINIS DNA SPEC QL NAA+PROBE: NOT DETECTED
U PARVUM DNA SPEC QL NAA+PROBE: NOT DETECTED
U UREALYTICUM DNA SPEC QL NAA+PROBE: DETECTED

## 2024-09-24 ENCOUNTER — HOSPITAL ENCOUNTER (EMERGENCY)
Facility: CLINIC | Age: 29
Discharge: HOME | End: 2024-09-24
Attending: FAMILY MEDICINE | Admitting: FAMILY MEDICINE
Payer: COMMERCIAL

## 2024-09-24 ENCOUNTER — LAB REQUISITION (OUTPATIENT)
Dept: LAB | Facility: CLINIC | Age: 29
End: 2024-09-24
Payer: COMMERCIAL

## 2024-09-24 VITALS
SYSTOLIC BLOOD PRESSURE: 137 MMHG | RESPIRATION RATE: 20 BRPM | DIASTOLIC BLOOD PRESSURE: 82 MMHG | TEMPERATURE: 97.7 F | BODY MASS INDEX: 44.05 KG/M2 | WEIGHT: 258 LBS | HEART RATE: 95 BPM | OXYGEN SATURATION: 100 % | HEIGHT: 64 IN

## 2024-09-24 DIAGNOSIS — S03.40XA SPRAIN OF TEMPOROMANDIBULAR JOINT, INITIAL ENCOUNTER: ICD-10-CM

## 2024-09-24 DIAGNOSIS — R89.5 ABNORMAL MICROBIOLOGICAL FINDINGS IN SPECIMENS FROM OTHER ORGANS, SYSTEMS AND TISSUES: ICD-10-CM

## 2024-09-24 PROCEDURE — 99283 EMERGENCY DEPT VISIT LOW MDM: CPT | Performed by: FAMILY MEDICINE

## 2024-09-24 PROCEDURE — 250N000012 HC RX MED GY IP 250 OP 636 PS 637: Performed by: FAMILY MEDICINE

## 2024-09-24 PROCEDURE — 87563 M. GENITALIUM AMP PROBE: CPT | Mod: ORL | Performed by: PHYSICIAN ASSISTANT

## 2024-09-24 RX ADMIN — DEXAMETHASONE 6 MG: 2 TABLET ORAL at 08:07

## 2024-09-24 ASSESSMENT — COLUMBIA-SUICIDE SEVERITY RATING SCALE - C-SSRS
1. IN THE PAST MONTH, HAVE YOU WISHED YOU WERE DEAD OR WISHED YOU COULD GO TO SLEEP AND NOT WAKE UP?: NO
6. HAVE YOU EVER DONE ANYTHING, STARTED TO DO ANYTHING, OR PREPARED TO DO ANYTHING TO END YOUR LIFE?: NO
2. HAVE YOU ACTUALLY HAD ANY THOUGHTS OF KILLING YOURSELF IN THE PAST MONTH?: NO

## 2024-09-24 ASSESSMENT — ACTIVITIES OF DAILY LIVING (ADL): ADLS_ACUITY_SCORE: 33

## 2024-09-24 NOTE — ED TRIAGE NOTES
"Patient yawned this morning at about 0630 and felt a \"pop\" in her left jaw. C/o pain to jaw and neck now.      Triage Assessment (Adult)       Row Name 09/24/24 0645          Triage Assessment    Airway WDL WDL        Respiratory WDL    Respiratory WDL WDL        Skin Circulation/Temperature WDL    Skin Circulation/Temperature WDL WDL                     "

## 2024-09-24 NOTE — ED PROVIDER NOTES
History     Chief Complaint   Patient presents with    Jaw Pain     HPI  Kena Sorto is a 29 year old female who presents with left-sided jaw pain.  This started after she yawned.  Patient states since then she has had pain kind of on the left inside of her throat and down into her neck.  It hurts when she swallows.  Denies any trouble breathing.  Patient states that she is having absolutely no symptoms beforehand.  She feels like she can talk okay and open and close her jaw okay right now.    Allergies:  Allergies   Allergen Reactions    Keflex [Cephalexin Hcl] Hives    Penicillins Other (See Comments) and Swelling     Throat swelling    Other Reaction(s): Swelling, lips/tongue    Prozac [Fluoxetine] Nausea     Patient requested a change       Problem List:    Patient Active Problem List    Diagnosis Date Noted    Spider vein of left lower extremity 07/30/2024     Priority: Medium    History of postpartum depression 07/05/2018     Priority: Medium    Seasonal allergic rhinitis 04/06/2017     Priority: Medium    Major depressive disorder, recurrent episode, mild (H24) 08/24/2016     Priority: Medium    Mild persistent asthma without complication 08/24/2016     Priority: Medium    Severe obesity (BMI 35.0-39.9) 05/02/2012     Priority: Medium    Chronic adenoiditis 03/03/2010     Priority: Medium     Per ENT visit      Attention deficit disorder 01/31/2008     Priority: Medium    Generalized anxiety disorder 01/31/2008     Priority: Medium        Past Medical History:    Past Medical History:   Diagnosis Date    Chronic adenoiditis 03/03/2010    Encounter for insertion of mirena IUD 10/02/2018    Hypertrophy of nasal turbinates 03/03/2010    Iron deficiency anemia, unspecified     Mild intermittent asthma     Plantar fasciitis 03/15/2012    Pneumonia, organism unspecified(486)     Post partum depression 07/05/2018    Pre-eclampsia, severe, antepartum, third trimester 03/22/2022    Seasonal allergies        Past  Surgical History:    Past Surgical History:   Procedure Laterality Date    COLONOSCOPY N/A 2024    Procedure: Colonoscopy;  Surgeon: Al Nowak DO;  Location: [facility]    ESOPHAGOSCOPY, GASTROSCOPY, DUODENOSCOPY (EGD), COMBINED N/A 2024    Procedure: Esophagoscopy, gastroscopy, duodenoscopy (EGD), combined;  Surgeon: Al Nowak DO;  Location: [facility]    HC THERAPUTIC FRACTURE INFER TURBINATE  03/30/10    TONSILLECTOMY & ADENOIDECTOMY  03/30/10    turbinoplasty       Family History:    Family History   Problem Relation Age of Onset    Diabetes Mother     Hypertension Mother     Hyperlipidemia Mother     Deep Vein Thrombosis Mother     Gastrointestinal Disease Father     Heart Disease Father     Back Pain Father     Deep Vein Thrombosis Father     Anxiety Disorder Sister     Depression Sister     Hearing Loss Sister         congenital    Coronary Artery Disease Maternal Grandmother     Cerebrovascular Disease Maternal Grandmother     Diabetes Maternal Grandfather         Type II    Cerebrovascular Disease Maternal Grandfather     Cancer Paternal Grandmother         lung (she was a smoker)    Coronary Artery Disease Paternal Grandfather         aortic aneurysm    Food Allergy Daughter 3        peanut    Allergies Daughter     Asthma Daughter        Social History:  Marital Status:  Single [1]  Social History     Tobacco Use    Smoking status: Former     Current packs/day: 0.00     Types: Cigarettes     Quit date:      Years since quittin.7    Smokeless tobacco: Never    Tobacco comments:     no smokers in the household   Vaping Use    Vaping status: Never Used   Substance Use Topics    Alcohol use: No    Drug use: No        Medications:    acetaminophen (TYLENOL) 325 MG tablet  albuterol (PROAIR HFA/PROVENTIL HFA/VENTOLIN HFA) 108 (90 Base) MCG/ACT inhaler  budesonide-formoterol (SYMBICORT) 160-4.5 MCG/ACT Inhaler  cetirizine (ZYRTEC ALLERGY) 10 MG tablet  cholecalciferol (VITAMIN  "D3) 125 mcg (5000 units) capsule  ibuprofen (ADVIL/MOTRIN) 600 MG tablet  omeprazole (PRILOSEC) 20 MG DR capsule  propranolol (INDERAL) 10 MG tablet          Review of Systems   All other systems reviewed and are negative.      Physical Exam   BP: 137/82  Pulse: 95  Temp: 97.7  F (36.5  C)  Resp: 20  Height: 162.6 cm (5' 4\")  Weight: 117 kg (258 lb)  SpO2: 100 %      Physical Exam  Vitals and nursing note reviewed.   Constitutional:       General: She is not in acute distress.     Appearance: Normal appearance. She is not ill-appearing.   HENT:      Head: Normocephalic.      Jaw: Tenderness present. No trismus, pain on movement or malocclusion.      Right Ear: Tympanic membrane normal. There is no impacted cerumen.      Left Ear: Tympanic membrane normal. There is no impacted cerumen.      Nose: Nose normal.      Mouth/Throat:      Pharynx: No oropharyngeal exudate or posterior oropharyngeal erythema.      Comments: No signs of any inflammation or swelling over the left tonsil pillar area  Musculoskeletal:      Cervical back: No rigidity or tenderness.   Lymphadenopathy:      Cervical: Cervical adenopathy present.   Neurological:      Mental Status: She is alert.         ED Course        Procedures        No results found for this or any previous visit (from the past 24 hour(s)).    Medications   dexAMETHasone (DECADRON) tablet 6 mg (has no administration in time range)     Patient does have some tenderness over her left TMJ area, I think patient likely has a sprain of this that is causing the pain.  I do not see anything inside the mouth that shows any signs of inflammation or signs of infection.  I will give her a one-time dose of steroids right now which may help calm the inflammation, she will stick with soft solids for the next day or 2, continue ibuprofen as needed for pain.  Patient was told to follow-up if there is no improvement towards the end of the week.    Assessments & Plan (with Medical Decision " Making)  Left TMJ sprain     I have reviewed the nursing notes.    I have reviewed the findings, diagnosis, plan and need for follow up with the patient.      New Prescriptions    No medications on file       Final diagnoses:   Sprain of temporomandibular joint, initial encounter       9/24/2024   [facility] EMERGENCY DEPT       Maksim Farr MD  09/24/24 0836

## 2024-09-24 NOTE — DISCHARGE INSTRUCTIONS
1.  Please stick with soft solids when eating for the next 24 to 48 hours.  2.  Please continue to alternate ibuprofen and Tylenol as needed for discomfort.

## 2024-09-27 LAB
M GENITALIUM DNA SPEC QL NAA+PROBE: NOT DETECTED
M HOMINIS DNA SPEC QL NAA+PROBE: NOT DETECTED
U PARVUM DNA SPEC QL NAA+PROBE: NOT DETECTED
U UREALYTICUM DNA SPEC QL NAA+PROBE: NOT DETECTED

## 2024-10-27 NOTE — TELEPHONE ENCOUNTER
I called pt and informed her of the below msg. She asked if she should still tamper off the effexor. I spoke with Darcy and she states that she may be feeling sick because she is tampering off the effexor and she should keep tampering if she and tolerate it and stay on the Prozac and that we can send in a rx of zofran for her. She would like the zofran and also miralax cause she states that the Prozac is making her constipated.  She will let us know how she is feeling after she is done with the zoloft.  Chantal Bland MA      upper/abdomen/Right:

## 2024-11-27 ENCOUNTER — MYC MEDICAL ADVICE (OUTPATIENT)
Dept: FAMILY MEDICINE | Facility: OTHER | Age: 29
End: 2024-11-27
Payer: COMMERCIAL

## 2024-11-27 DIAGNOSIS — E66.01 SEVERE OBESITY WITH BODY MASS INDEX (BMI) OF 35.0 TO 39.9 WITH SERIOUS COMORBIDITY (H): Primary | ICD-10-CM

## 2024-12-09 ASSESSMENT — SLEEP AND FATIGUE QUESTIONNAIRES
HOW LIKELY ARE YOU TO NOD OFF OR FALL ASLEEP WHILE WATCHING TV: MODERATE CHANCE OF DOZING
HOW LIKELY ARE YOU TO NOD OFF OR FALL ASLEEP WHILE SITTING QUIETLY AFTER LUNCH WITHOUT ALCOHOL: MODERATE CHANCE OF DOZING
HOW LIKELY ARE YOU TO NOD OFF OR FALL ASLEEP WHILE SITTING AND READING: MODERATE CHANCE OF DOZING
HOW LIKELY ARE YOU TO NOD OFF OR FALL ASLEEP WHILE LYING DOWN TO REST IN THE AFTERNOON WHEN CIRCUMSTANCES PERMIT: HIGH CHANCE OF DOZING
HOW LIKELY ARE YOU TO NOD OFF OR FALL ASLEEP WHILE SITTING AND TALKING TO SOMEONE: SLIGHT CHANCE OF DOZING
HOW LIKELY ARE YOU TO NOD OFF OR FALL ASLEEP WHILE SITTING INACTIVE IN A PUBLIC PLACE: SLIGHT CHANCE OF DOZING
HOW LIKELY ARE YOU TO NOD OFF OR FALL ASLEEP IN A CAR, WHILE STOPPED FOR A FEW MINUTES IN TRAFFIC: SLIGHT CHANCE OF DOZING
HOW LIKELY ARE YOU TO NOD OFF OR FALL ASLEEP WHEN YOU ARE A PASSENGER IN A CAR FOR AN HOUR WITHOUT A BREAK: MODERATE CHANCE OF DOZING

## 2024-12-09 NOTE — PROGRESS NOTES
"Virtual Visit Details    Type of service:  Video Visit     Originating Location (pt. Location): Home    Distant Location (provider location):  Off-site  Platform used for Video Visit: Mitchell          67 minutes spent by me on the date of the encounter doing chart review, history and exam, documentation and further activities per the note    New Bariatric Surgery Consultation Note    2024    RE: Kena Sorto  MR#: [id number]  : 1995      Referring provider:       2024    11:31 AM   --   Who referred you Chantal Aguero MD       Chief Complaint/Reason for visit: evaluation for possible weight loss surgery    Dear Chantal Bowman MD (General),    I had the pleasure of seeing your patient, Kena Sorto, to evaluate her obesity and consider her for possible weight loss surgery. As you know, Kena Sorto is 29 year old.  She has a height of 5' 4\", a weight of 263 lbs 0 oz, and calculated Body mass index is 45.14 kg/m .    Assessment & Plan   Problem List Items Addressed This Visit       Severe obesity (BMI 35.0-39.9)    Relevant Medications    tirzepatide-Weight Management (ZEPBOUND) 2.5 MG/0.5ML prefilled pen    tirzepatide-Weight Management (ZEPBOUND) 5 MG/0.5ML prefilled pen     Other Visit Diagnoses       Class 3 severe obesity with serious comorbidity and body mass index (BMI) of 40.0 to 44.9 in adult, unspecified obesity type (H)    -  Primary    Relevant Medications    tirzepatide-Weight Management (ZEPBOUND) 2.5 MG/0.5ML prefilled pen    tirzepatide-Weight Management (ZEPBOUND) 5 MG/0.5ML prefilled pen    Other Relevant Orders    Adult Sleep Eval & Management Referral    Parathyroid Hormone Intact    Lipid panel reflex to direct LDL Fasting    Vitamin D Deficiency    Vitamin A    Vitamin B12    Hemoglobin A1c    Comprehensive metabolic panel    CBC with platelets                     HISTORY OF PRESENT ILLNESS:      2024    11:31 AM   Weight Loss History Reviewed with Patient   How " long have you been overweight? Since puberty   What is the most that you have ever weighed 263.5   What is the most weight you have lost? 75   I have tried the following methods to lose weight Watching portions or calories    Exercise    Pre packaged meals ex: Nutrisystem    OTC Medications   I have tried the following weight loss medications? (Check all that apply) None     Overweight onset age as a child. Was been able to lose 75lbs age 15-16 when doing a diet plan ([employer]) with her mom, but then stopped due to cost and then saw weight regain. More notable weight gain age 19, going through a lot of big changes in her life at that time- broke up with her boyfriend, recalls she was very stressed about becoming an adult.   Weight gain has since been gradual. Further weight gain with 2 pregnancies, last child born 2 years ago. Has been weight stable between 250-260lbs for the last 24 months. Current weight of 263.5lbs is highest weight in life.       Comorbidities include  asthma, concern for sleep apnea (has been told she stops breathing during sleep), low back pain, GERD (well managed with daily omeprazole 20mg). Pre-eclampsia with pregnancy.   Additional health issues- MDD, anxiety, ADD (sees psychiatrist once a month, nut currently seeing a therapist, is open to re-establishing with one). History of iron deficiency anemia- resolved with regular iron pills .      Motivators include improve health, be a good role model for her kids, improve self esteem.     She would like to pursue bariatric surgery for sustainable weight loss.    Regarding eating patterns and diet, she  typically eats 1-2 meals a day plus snacks. Craves both sweets and salty, depending on the day . Is generally able to get full. Struggles to stay full until her next meal. Eats out/ gets take out nearly every day at work.       Does struggle with portion control. Does not experience food noise. Does experience emotional eating (stress). Does at  times experience a loss of control around eating .    Drinks mostly water, doesn't like pop. Coffee in the morning with caramel flavoring and heavy whipping cream. ETOH rarely, maybe on special occasions. No juice.     Regarding activity, works in HR, desk work. Outside of work is busy taking care of 6 year old and 2 year old daughters, very busy taking them to activities. No structured exercise throughout the week at this time. In the past liked the eliptical, but now this causes hip pain so it is hard for her to revisit this. Enjoys walking.         Past/ Current AOMs   OTC medications through [employer], nothing prescribed by a doctor         CO-MORBIDITIES OF OBESITY INCLUDE:      12/9/2024    11:31 AM   --   I have the following health issues associated with obesity Asthma             JERMAINE- snores, her mom has witnessed her stop breathing in her sleep. Feels fatigued when she wakes up in the morning.       History of bleeding or clotting disorder? No    Is patient on biologics or immunomodulators? No        PAST MEDICAL HISTORY:  Past Medical History:   Diagnosis Date    Chronic adenoiditis 03/03/2010    Encounter for insertion of Mirena IUD 10/02/2018    Remove by 10/2/2023    Hypertrophy of nasal turbinates 03/03/2010    Iron deficiency anemia, unspecified     TREATED WITH IRON SUPPLEMENTS    Mild intermittent asthma     Plantar fasciitis 03/15/2012    Pneumonia, organism unspecified(486)     Pneumonia    Post partum depression 07/05/2018    Pre-eclampsia, severe, antepartum, third trimester 03/22/2022    Seasonal allergies            PAST SURGICAL HISTORY:  Past Surgical History:   Procedure Laterality Date    COLONOSCOPY N/A 5/9/2024    Procedure: Colonoscopy;  Surgeon: Al Nowak DO;  Location:  GI    ESOPHAGOSCOPY, GASTROSCOPY, DUODENOSCOPY (EGD), COMBINED N/A 5/9/2024    Procedure: Esophagoscopy, gastroscopy, duodenoscopy (EGD), combined;  Surgeon: Al Nowak DO;  Location:  [facility]    HC THERAPUTIC FRACTURE INFER TURBINATE  03/30/10    TONSILLECTOMY & ADENOIDECTOMY  03/30/10    turbinoplasty     No abdominal surgical history.     FAMILY HISTORY:   Family History   Problem Relation Age of Onset    Diabetes Mother     Hypertension Mother     Hyperlipidemia Mother     Deep Vein Thrombosis Mother     Gastrointestinal Disease Father     Heart Disease Father     Back Pain Father     Deep Vein Thrombosis Father     Anxiety Disorder Sister     Depression Sister     Hearing Loss Sister         congenital    Coronary Artery Disease Maternal Grandmother     Cerebrovascular Disease Maternal Grandmother     Diabetes Maternal Grandfather         Type II    Cerebrovascular Disease Maternal Grandfather     Cancer Paternal Grandmother         lung (she was a smoker)    Coronary Artery Disease Paternal Grandfather         aortic aneurysm    Food Allergy Daughter 3        peanut    Allergies Daughter     Asthma Daughter        SOCIAL HISTORY:       12/9/2024    11:31 AM   Social History Questions Reviewed With Patient   Which best describes your employment status (select all that apply) I work full-time   If you work, 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 Mom, dad, step dad, 2 sisters   Who can you count on for support throughout your weight loss surgery journey? my whole family     Nicotine use - formerly, quit 2.5 years ago (March 2022). Prior to quitting had smoked for 2 years, 1/2 pack daily. No current tobacco products or vaping.   Alcohol use -seldom   Marijauna use - none       HABITS:      12/9/2024    11:31 AM   --   How often do you drink alcohol? Never   Do you currently use any of the following Nicotine products? No   Have you ever used any of the following nicotine products? Cigarettes   If you previously used any of these  products, what year did you quit? 2022   Have you or are you currently using street drugs or prescription strength medication for which you do not have a prescription for? No   Do you have a history of chemical dependency (alcohol or drug abuse)? No     Currently taking narcotic/opioids No      PSYCHOLOGICAL HISTORY:       12/9/2024    11:31 AM   Psychological History Reviewed With Patient   Have you ever attempted suicide? Never.   Have you had thoughts of suicide in the past year? No   Have you ever been hospitalized for mental illness or a suicide attempt? Never.   Do you have a history of chronic pain? No   Have you ever been diagnosed with fibromyalgia? No   Are you currently being treated for any of the following? (select all that apply) Depression    Anxiety    Post traumatic stress disorder   Are you currently seeing a therapist or counselor? No   Are you currently seeing a psychiatrist? Yes     Denies any issues with suicide in the past. Currently working with a psychiatrist, would be open to working with a therapist again.       ROS:      12/9/2024    11:31 AM   --   Skin Varicose veins   HEENT Headaches   Musculoskeletal Back pain   Cardiovascular Shortness of breath with activity   Pulmonary Shortness of breath at rest    Shortness of breath with activity    People have told me I stop breathing while asleep   Gastrointestinal Heartburn   Genitourinary None of the above   Hematological None of the above   Neurological None of the above   Female only Irregular menstrual cycles    Birth control       GI specific ROS   GERD: Yes -managed with regular omeprazole 20mg  Frequency of GERD symptoms - none if she takes her omeprazole regularly.   Duration of GERD symptoms: 4 years   History of peptic ulcer disease: No  Dysphagia: denies   Vomiting No  Melena No  Hematochezia No  Last EGD 5/9/2024 (during work up for iron deficiency anemia), Dr. Al Nowak   Findings:       The esophagus was normal.         The stomach was normal.        The examined duodenum was normal.                                                                                    Impression:            - Normal esophagus.                          - Normal stomach.                          - Normal examined duodenum.                          - No specimens collected.   Recommendation:        - Discharge patient to home.                          - Resume previous diet.                          - Continue present medications.   EATING BEHAVIORS:      12/9/2024    11:31 AM   --   Have you or anyone else thought that you had an eating disorder? Yes   If you answered yes to the previous eating disorder question, select the types that apply from this list Binge Eating   Do you currently binge eat (eat a large amount of food in a short time)? Yes   Are you an emotional eater? Yes   Do you get up to eat after falling asleep? No   Can you afford 3 meals a day? Yes   Can you afford 50-60 dollars a month for vitamins? Yes     If she's feeling really stressed at work or with regards to relationship with ex-, will try to cope with feelings with food. Typically this eating occurs at home, will eat several different foods in one sitting- ice cream, chips, cereal, spaghetti.     Feels bad, worried about gaining weight after eating this food. Denies ever purging to compensate for overeating. Estimates this is occurring once a week.     EXERCISE:      12/9/2024    11:31 AM   --   How often do you exercise? Less than 1 time per week   What is the duration of your exercise (in minutes)? 10 Minutes   What types of exercise do you do? walking   What keeps you from being more active? Pain    Shortness of breath    Lack of Time    Too tired       MEDICATIONS:  Current Outpatient Medications   Medication Sig Dispense Refill    cetirizine (ZYRTEC ALLERGY) 10 MG tablet Take 1 tablet (10 mg) by mouth daily 90 tablet 0    cholecalciferol (VITAMIN D3) 125 mcg (5000  units) capsule       clobetasol propionate (TEMOVATE) 0.05 % external cream       desvenlafaxine succinate (PRISTIQ) 25 MG 24 hr tablet       omeprazole (PRILOSEC) 20 MG DR capsule Take 1 capsule (20 mg) by mouth daily      propranolol (INDERAL) 10 MG tablet       tirzepatide-Weight Management (ZEPBOUND) 2.5 MG/0.5ML prefilled pen Inject 0.5 mLs (2.5 mg) subcutaneously every 7 days. For 4 weeks 2 mL 0    tirzepatide-Weight Management (ZEPBOUND) 5 MG/0.5ML prefilled pen Inject 0.5 mLs (5 mg) subcutaneously every 7 days. After completing 4 weeks of taking the 2.5mg dose 2 mL 2    acetaminophen (TYLENOL) 325 MG tablet Take 2 tablets (650 mg) by mouth every 4 hours as needed for mild pain or fever (greater than or equal to 38  C /100.4  F (oral) or 38.5  C/ 101.4  F (core).)      albuterol (PROAIR HFA/PROVENTIL HFA/VENTOLIN HFA) 108 (90 Base) MCG/ACT inhaler Inhale 2 puffs into the lungs every 6 hours as needed for shortness of breath or wheezing 18 g 1    budesonide-formoterol (SYMBICORT) 160-4.5 MCG/ACT Inhaler INHALE 2 PUFFS BY MOUTH TWICE A DAY RINSE MOUTH AFTER USE 30.6 g 1    ibuprofen (ADVIL/MOTRIN) 600 MG tablet Take 1 tablet (600 mg) by mouth every 6 hours as needed for moderate pain 3 tablet     phentermine 15 MG capsule Take 1 capsule (15 mg) by mouth every morning. 30 capsule 2    topiramate (TOPAMAX) 25 MG tablet 25mg at bedtime for week 1, 50mg at bedtime for 1 week, and 75mg at bedtime thereafter 90 tablet 3         antipsychotic medications- lamotrigine in the past, stopped recently     ALLERGIES:  Allergies   Allergen Reactions    Keflex [Cephalexin Hcl] Hives    Penicillins Other (See Comments) and Swelling     Throat swelling    Other Reaction(s): Swelling, lips/tongue    Prozac [Fluoxetine] Nausea     Patient requested a change               12/9/2024    11:22 AM   KULDEEP Score (Last Two)   KULDEEP Raw Score 33    Activation Score 65.8    KULDEEP Level 3        Patient-reported       FIB-4 Calculation: 0.26 at  "2/7/2024  9:13 AM  Calculated from:  SGOT/AST: 17 U/L at 2/7/2024  9:13 AM  SGPT/ALT: 16 U/L at 2/7/2024  9:13 AM  Platelets: 419 10e3/uL at 2/7/2024  9:13 AM  Age: 28 years    Fib-4 < 1.3: No further evaluation at this point, unless other concerns    - If the Fib-4 is >2.67  Fibroscan and elective liver clinic referral    - Intermediate Fib-4 scores: Get a Fibroscan, consider repeating this in 1-2 years.    Anti-obesity medication ROS:    HEENT  Hx of glaucoma: No    Cardiovascular  CAD:No  HTN:No    Gastrointestinal  GERD:Yes  Constipation:Yes- if she doesn't drink coffee she will not have a bm for 2-3 days. If she does have coffee feels bms are 1-2 daily. Has used miralax in the past for constipation which was also helpful.   Liver Dz:No  H/O Pancreatitis:No    Psychiatric  Bipolar: No  Anxiety:Yes  Depression:Yes  Suicidal Ideation: No  History of alcohol/drug abuse: No  Hx of eating disorder:No    Endocrine  Personal or family hx of MTC or MEN2:No  Diabetes/prediabetes: No    Neurologic:  Hx of seizures: No  Hx of migraines: No  Memory Impairment: No  CVA history: No        History of kidney stones: No  Kidney disease: No  Current birth control: Yes - IUD   Plans of future pregnancy: none       Objective    Ht 1.626 m (5' 4\")   Wt 119.3 kg (263 lb)   BMI 45.14 kg/m           Vitals:  No vitals were obtained today due to virtual visit.    Physical Exam   GENERAL: alert and no distress  EYES: Eyes grossly normal to inspection.  No discharge or erythema, or obvious scleral/conjunctival abnormalities.  RESP: No audible wheeze, cough, or visible cyanosis.    SKIN: Visible skin clear. No significant rash, abnormal pigmentation or lesions.  NEURO: Cranial nerves grossly intact.  Mentation and speech appropriate for age.  PSYCH: Appropriate affect, tone, and pace of words        In summary, Kena Sorto has Class III obesity with a body mass index of Body mass index is 45.14 kg/m . kg/m2 and the comorbidities " stated above. She completed an informational seminar and is a possible candidate for the laparoscopic gastric sleeve.  She will have to complete the following pre-requisites:    Received weight loss goal of 10 lb prior to surgery.  Achieve clearance from dietitian to see surgeon.  Have preoperative laboratory tests drawn.  Psychological Evaluation with MMPI and clearance for weight loss surgery.  Letter of clearance from the following sleep, psychiatry,  primary care . Will also need to get established with a therapist.     Today in the office we discussed gastric sleeve surgery. Preoperative, perioperative, and postoperative processes, management, and follow up were addressed.  Risks and benefits were outlined including the risk of death, staple line leak (1-2%), PE, DVT, ulcer, worsening GERD, N/V, stricture, hernia, wound infection, weight regain, and vitamin deficiencies. I emphasized exercise and activity along with appropriate food choice as the main foundation for weight loss with surgery providing surgical reinforcement of this.  All questions were answered.  A goal sheet and support group handout were given to the patient.        If you have not already watched our online seminar please go to [email or web address]    Weight loss requirement: 10lbs prior to surgery. Goal Weight: 253, pending in person weight check. Will have final weight check 2-3 weeks prior to surgery at anesthesia or nurse pre-op teaching visit.    -Need current weight confirmation at primary clinic or weight management clinic : Yes    Bariatric labs ordered, call for a lab only appointment at any [facility] lab. To find a lab location near you, please call [phone]. Please let us know if orders need to be faxed to a non [facility] lab.    Schedule bariatric psych eval as soon as possible.  List of psychologists will be sent to you via [employer] or given to you in clinic.     Call Kevan Weir at [phone] to  discuss insurance coverage for bariatric surgery.  Please check with your insurance regarding bariatric surgery coverage also. Kevan can also help you with scheduling psych eval if you are having difficulties.    The following clearance letters are needed: Letter templates will be sent to you via [facility] or given to you in clinic. Providers can submit through electronic medical record or fax to [phone].  - Primary care provider, sleep medicine, psychiatrist. Also will need to get established with therapist.     Smoking cessation and nicotine test needed: No    Birth control after surgery discussed. Patient instructed that 2 forms of birth control required after surgery and to avoid pregnancy for at least 18 months after surgery: has IUD   NEXT VISITS: A  should reach out to you to schedule the following appointments.  If they do not reach you please call [phone] to schedule the following appointments:    -See dietitian in 1 month and monthly for 3 months    -See Danna in 3 months to follow up on pre-op weight loss and weight loss medications    -See Dr Cardona in 1 month for bariatric surgeon visit. Discuss bariatric surgery.     -Group Dietician visit scheduled     -New bariatric consult class scheduled     Anti-obesity medication started today for this patient   WEGOVY  No history of pancreatitis   No personal or family history of medullary thyroid carcinoma  No personal or family history of Multiple Endocrine Neoplasia Type 2  On birth control IUD   . Side effects and risks associated with this medication, as well as medication administration instructions, were discussed. Patient expressed understanding and a willingness to proceed with starting this medication.       Potential anti-obesity medications for this patient the future if there are issues with cost or side effects  NALTREXONE  Did not discuss, could consider in the future   No history of liver disease  No chronic pain   No current  opioid use   .  METFORMIN  Did not discuss, could consider in the future   No history of chronic kidney disease  GFR >45  .    The following medications could be considered with caution for this patient   PHENTERMINE  No history of hypertension  No history of CVA   No history of cardiovascular disease   No history of cardiac arrhythmias   No history of glaucoma  No history of drug abuse  No history of hyperthyroidism  Caution would be needed with this medication due to significant anxiety, would want to confirm with psychiatry prior to starting   .  TOPIRAMATE  No history of kidney stones  No history of glaucoma   No issues with memory  No chronic kidney disease   On birth control IUD   Caution would be needed with this medication due to depression, would want to confirm with psychiatrist prior to starting   .  BUPROPION  No history of bipolar disorder  No history of hypertension  No history of cardiovascular disease   No history of cardiac arrhythmias   No history of seizures  No history of bulimia nervosa  No history of anorexia nervosa  Caution would be needed with this medication due to anxiety, would want to confirm with psychiatry prior to starting    .       Once the patient has completed the requirements in their task list and there are no further recommendations, the pt will be allowed to see the surgeon of their choice for consultation on the laparoscopic gastric sleeve surgery. Patient verbalizes understanding of the process to surgery and expectations for the postoperative period including the need for lifelong lifestyle changes, vitamin supplementation, and laboratory monitoring.    Medications that may contribute to the patient's obesity, such as antipsychotic medications, have been identified. Correctable endocrine disorders and other medical conditions have been ruled out, or are under successful treatment. Identified personal barriers to making and continuing needed life changes. Identified behavior  changes/strategies to address personal barriers.    Sincerely,     Danna Garzon PA-C     The longitudinal plan of care for the diagnosis(es)/condition(s) as documented were addressed during this visit. Due to the added complexity in care, I will continue to support Kena in the subsequent management and with ongoing continuity of care.

## 2024-12-10 ENCOUNTER — VIRTUAL VISIT (OUTPATIENT)
Dept: ENDOCRINOLOGY | Facility: CLINIC | Age: 29
End: 2024-12-10
Payer: COMMERCIAL

## 2024-12-10 ENCOUNTER — TELEPHONE (OUTPATIENT)
Dept: ENDOCRINOLOGY | Facility: CLINIC | Age: 29
End: 2024-12-10

## 2024-12-10 ENCOUNTER — TELEPHONE (OUTPATIENT)
Dept: SURGERY | Facility: CLINIC | Age: 29
End: 2024-12-10

## 2024-12-10 VITALS — HEIGHT: 64 IN | BODY MASS INDEX: 44.9 KG/M2 | WEIGHT: 263 LBS

## 2024-12-10 DIAGNOSIS — E66.01 SEVERE OBESITY WITH BODY MASS INDEX (BMI) OF 35.0 TO 39.9 WITH SERIOUS COMORBIDITY (H): ICD-10-CM

## 2024-12-10 DIAGNOSIS — E66.01 CLASS 3 SEVERE OBESITY WITH SERIOUS COMORBIDITY AND BODY MASS INDEX (BMI) OF 40.0 TO 44.9 IN ADULT, UNSPECIFIED OBESITY TYPE (H): Primary | ICD-10-CM

## 2024-12-10 DIAGNOSIS — E66.813 CLASS 3 SEVERE OBESITY WITH SERIOUS COMORBIDITY AND BODY MASS INDEX (BMI) OF 40.0 TO 44.9 IN ADULT, UNSPECIFIED OBESITY TYPE (H): Primary | ICD-10-CM

## 2024-12-10 RX ORDER — DESVENLAFAXINE 25 MG/1
TABLET, EXTENDED RELEASE ORAL
COMMUNITY
Start: 2024-11-22

## 2024-12-10 RX ORDER — VENLAFAXINE HYDROCHLORIDE 37.5 MG/1
CAPSULE, EXTENDED RELEASE ORAL
COMMUNITY
Start: 2024-09-23 | End: 2024-12-10 | Stop reason: ALTCHOICE

## 2024-12-10 RX ORDER — METRONIDAZOLE 500 MG/1
TABLET ORAL
COMMUNITY
Start: 2024-08-01 | End: 2024-12-10

## 2024-12-10 RX ORDER — CLOBETASOL PROPIONATE 0.5 MG/G
CREAM TOPICAL
COMMUNITY
Start: 2024-08-01

## 2024-12-10 RX ORDER — SEMAGLUTIDE 0.5 MG/.5ML
0.5 INJECTION, SOLUTION SUBCUTANEOUS WEEKLY
Qty: 2 ML | Refills: 1 | OUTPATIENT
Start: 2024-12-10 | End: 2024-12-10

## 2024-12-10 RX ORDER — SEMAGLUTIDE 0.5 MG/.5ML
0.5 INJECTION, SOLUTION SUBCUTANEOUS WEEKLY
Qty: 2 ML | Refills: 0 | OUTPATIENT
Start: 2024-12-10 | End: 2024-12-10

## 2024-12-10 RX ORDER — SEMAGLUTIDE 0.25 MG/.5ML
0.25 INJECTION, SOLUTION SUBCUTANEOUS WEEKLY
Qty: 2 ML | Refills: 0 | OUTPATIENT
Start: 2024-12-10 | End: 2024-12-10

## 2024-12-10 RX ORDER — VENLAFAXINE HYDROCHLORIDE 37.5 MG/1
CAPSULE, EXTENDED RELEASE ORAL
COMMUNITY
Start: 2024-08-28 | End: 2024-12-10 | Stop reason: ALTCHOICE

## 2024-12-10 ASSESSMENT — PAIN SCALES - GENERAL: PAINLEVEL_OUTOF10: NO PAIN (0)

## 2024-12-10 NOTE — LETTER
"12/10/2024       RE: Kena Sorto  [address]  [facility] [address]     Dear Colleague,    Thank you for referring your patient, Kena Sorto, to the [facility]. Please see a copy of my visit note below.    Virtual Visit Details    Type of service:  Video Visit     Originating Location (pt. Location): Home    Distant Location (provider location):  Off-site  Platform used for Video Visit: ROOOMERS          67 minutes spent by me on the date of the encounter doing chart review, history and exam, documentation and further activities per the note    New Bariatric Surgery Consultation Note    2024    RE: Kena Sorto  MR#: [id number]  : 1995      Referring provider:       2024    11:31 AM   --   Who referred you Chantal Aguero MD       Chief Complaint/Reason for visit: evaluation for possible weight loss surgery    Dear Chantal Bowman MD (General),    I had the pleasure of seeing your patient, Kena Sorto, to evaluate her obesity and consider her for possible weight loss surgery. As you know, Kena Sorto is 29 year old.  She has a height of 5' 4\", a weight of 263 lbs 0 oz, and calculated Body mass index is 45.14 kg/m .    Assessment & Plan  Problem List Items Addressed This Visit       Severe obesity (BMI 35.0-39.9)    Relevant Medications    tirzepatide-Weight Management (ZEPBOUND) 2.5 MG/0.5ML prefilled pen    tirzepatide-Weight Management (ZEPBOUND) 5 MG/0.5ML prefilled pen     Other Visit Diagnoses       Class 3 severe obesity with serious comorbidity and body mass index (BMI) of 40.0 to 44.9 in adult, unspecified obesity type (H)    -  Primary    Relevant Medications    tirzepatide-Weight Management (ZEPBOUND) 2.5 MG/0.5ML prefilled pen    tirzepatide-Weight Management (ZEPBOUND) 5 MG/0.5ML prefilled pen    Other Relevant Orders    Adult Sleep Eval & Management Referral    " Parathyroid Hormone Intact    Lipid panel reflex to direct LDL Fasting    Vitamin D Deficiency    Vitamin A    Vitamin B12    Hemoglobin A1c    Comprehensive metabolic panel    CBC with platelets                     HISTORY OF PRESENT ILLNESS:      12/9/2024    11:31 AM   Weight Loss History Reviewed with Patient   How long have you been overweight? Since puberty   What is the most that you have ever weighed 263.5   What is the most weight you have lost? 75   I have tried the following methods to lose weight Watching portions or calories    Exercise    Pre packaged meals ex: Nutrisystem    OTC Medications   I have tried the following weight loss medications? (Check all that apply) None     Overweight onset age as a child. Was been able to lose 75lbs age 15-16 when doing a diet plan ([employer]) with her mom, but then stopped due to cost and then saw weight regain. More notable weight gain age 19, going through a lot of big changes in her life at that time- broke up with her boyfriend, recalls she was very stressed about becoming an adult.   Weight gain has since been gradual. Further weight gain with 2 pregnancies, last child born 2 years ago. Has been weight stable between 250-260lbs for the last 24 months. Current weight of 263.5lbs is highest weight in life.       Comorbidities include  asthma, concern for sleep apnea (has been told she stops breathing during sleep), low back pain, GERD (well managed with daily omeprazole 20mg). Pre-eclampsia with pregnancy.   Additional health issues- MDD, anxiety, ADD (sees psychiatrist once a month, nut currently seeing a therapist, is open to re-establishing with one). History of iron deficiency anemia- resolved with regular iron pills .      Motivators include improve health, be a good role model for her kids, improve self esteem.     She would like to pursue bariatric surgery for sustainable weight loss.    Regarding eating patterns and diet, she  typically eats 1-2 meals a  day plus snacks. Craves both sweets and salty, depending on the day . Is generally able to get full. Struggles to stay full until her next meal. Eats out/ gets take out nearly every day at work.       Does struggle with portion control. Does not experience food noise. Does experience emotional eating (stress). Does at times experience a loss of control around eating .    Drinks mostly water, doesn't like pop. Coffee in the morning with caramel flavoring and heavy whipping cream. ETOH rarely, maybe on special occasions. No juice.     Regarding activity, works in [employer], desk work. Outside of work is busy taking care of 6 year old and 2 year old daughters, very busy taking them to activities. No structured exercise throughout the week at this time. In the past liked the eliptical, but now this causes hip pain so it is hard for her to revisit this. Enjoys walking.         Past/ Current AOMs   OTC medications through [employer], nothing prescribed by a doctor         CO-MORBIDITIES OF OBESITY INCLUDE:      12/9/2024    11:31 AM   --   I have the following health issues associated with obesity Asthma             JERMAINE- snores, her mom has witnessed her stop breathing in her sleep. Feels fatigued when she wakes up in the morning.       History of bleeding or clotting disorder? No    Is patient on biologics or immunomodulators? No        PAST MEDICAL HISTORY:  Past Medical History:   Diagnosis Date     Chronic adenoiditis 03/03/2010     Encounter for insertion of Mirena IUD 10/02/2018    Remove by 10/2/2023     Hypertrophy of nasal turbinates 03/03/2010     Iron deficiency anemia, unspecified     TREATED WITH IRON SUPPLEMENTS     Mild intermittent asthma      Plantar fasciitis 03/15/2012     Pneumonia, organism unspecified(486)     Pneumonia     Post partum depression 07/05/2018     Pre-eclampsia, severe, antepartum, third trimester 03/22/2022     Seasonal allergies            PAST SURGICAL HISTORY:  Past Surgical History:    Procedure Laterality Date     COLONOSCOPY N/A 5/9/2024    Procedure: Colonoscopy;  Surgeon: Al Nowak DO;  Location: [facility]     ESOPHAGOSCOPY, GASTROSCOPY, DUODENOSCOPY (EGD), COMBINED N/A 5/9/2024    Procedure: Esophagoscopy, gastroscopy, duodenoscopy (EGD), combined;  Surgeon: Al Nowak DO;  Location: [facility]     HC THERAPUTIC FRACTURE INFER TURBINATE  03/30/10     TONSILLECTOMY & ADENOIDECTOMY  03/30/10    turbinoplasty     No abdominal surgical history.     FAMILY HISTORY:   Family History   Problem Relation Age of Onset     Diabetes Mother      Hypertension Mother      Hyperlipidemia Mother      Deep Vein Thrombosis Mother      Gastrointestinal Disease Father      Heart Disease Father      Back Pain Father      Deep Vein Thrombosis Father      Anxiety Disorder Sister      Depression Sister      Hearing Loss Sister         congenital     Coronary Artery Disease Maternal Grandmother      Cerebrovascular Disease Maternal Grandmother      Diabetes Maternal Grandfather         Type II     Cerebrovascular Disease Maternal Grandfather      Cancer Paternal Grandmother         lung (she was a smoker)     Coronary Artery Disease Paternal Grandfather         aortic aneurysm     Food Allergy Daughter 3        peanut     Allergies Daughter      Asthma Daughter        SOCIAL HISTORY:       12/9/2024    11:31 AM   Social History Questions Reviewed With Patient   Which best describes your employment status (select all that apply) I work full-time   If you work, 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 Mom, dad, step dad, 2 sisters   Who can you count on for support throughout your weight loss surgery journey? my whole family     Nicotine use - formerly, quit 2.5 years ago (March 2022). Prior to quitting had smoked for 2 years, 1/2 pack  daily. No current tobacco products or vaping.   Alcohol use -seldom   Marijauna use - none       HABITS:      12/9/2024    11:31 AM   --   How often do you drink alcohol? Never   Do you currently use any of the following Nicotine products? No   Have you ever used any of the following nicotine products? Cigarettes   If you previously used any of these products, what year did you quit? 2022   Have you or are you currently using street drugs or prescription strength medication for which you do not have a prescription for? No   Do you have a history of chemical dependency (alcohol or drug abuse)? No     Currently taking narcotic/opioids No      PSYCHOLOGICAL HISTORY:       12/9/2024    11:31 AM   Psychological History Reviewed With Patient   Have you ever attempted suicide? Never.   Have you had thoughts of suicide in the past year? No   Have you ever been hospitalized for mental illness or a suicide attempt? Never.   Do you have a history of chronic pain? No   Have you ever been diagnosed with fibromyalgia? No   Are you currently being treated for any of the following? (select all that apply) Depression    Anxiety    Post traumatic stress disorder   Are you currently seeing a therapist or counselor? No   Are you currently seeing a psychiatrist? Yes     Denies any issues with suicide in the past. Currently working with a psychiatrist, would be open to working with a therapist again.       ROS:      12/9/2024    11:31 AM   --   Skin Varicose veins   HEENT Headaches   Musculoskeletal Back pain   Cardiovascular Shortness of breath with activity   Pulmonary Shortness of breath at rest    Shortness of breath with activity    People have told me I stop breathing while asleep   Gastrointestinal Heartburn   Genitourinary None of the above   Hematological None of the above   Neurological None of the above   Female only Irregular menstrual cycles    Birth control       GI specific ROS   GERD: Yes -managed with regular omeprazole  20mg  Frequency of GERD symptoms - none if she takes her omeprazole regularly.   Duration of GERD symptoms: 4 years   History of peptic ulcer disease: No  Dysphagia: denies   Vomiting No  Melena No  Hematochezia No  Last EGD 5/9/2024 (during work up for iron deficiency anemia), Dr. Al Nowak   Findings:       The esophagus was normal.        The stomach was normal.        The examined duodenum was normal.                                                                                    Impression:            - Normal esophagus.                          - Normal stomach.                          - Normal examined duodenum.                          - No specimens collected.   Recommendation:        - Discharge patient to home.                          - Resume previous diet.                          - Continue present medications.   EATING BEHAVIORS:      12/9/2024    11:31 AM   --   Have you or anyone else thought that you had an eating disorder? Yes   If you answered yes to the previous eating disorder question, select the types that apply from this list Binge Eating   Do you currently binge eat (eat a large amount of food in a short time)? Yes   Are you an emotional eater? Yes   Do you get up to eat after falling asleep? No   Can you afford 3 meals a day? Yes   Can you afford 50-60 dollars a month for vitamins? Yes     If she's feeling really stressed at work or with regards to relationship with ex-, will try to cope with feelings with food. Typically this eating occurs at home, will eat several different foods in one sitting- ice cream, chips, cereal, spaghetti.     Feels bad, worried about gaining weight after eating this food. Denies ever purging to compensate for overeating. Estimates this is occurring once a week.     EXERCISE:      12/9/2024    11:31 AM   --   How often do you exercise? Less than 1 time per week   What is the duration of your exercise (in minutes)? 10 Minutes   What types  of exercise do you do? walking   What keeps you from being more active? Pain    Shortness of breath    Lack of Time    Too tired       MEDICATIONS:  Current Outpatient Medications   Medication Sig Dispense Refill     cetirizine (ZYRTEC ALLERGY) 10 MG tablet Take 1 tablet (10 mg) by mouth daily 90 tablet 0     cholecalciferol (VITAMIN D3) 125 mcg (5000 units) capsule        clobetasol propionate (TEMOVATE) 0.05 % external cream        desvenlafaxine succinate (PRISTIQ) 25 MG 24 hr tablet        omeprazole (PRILOSEC) 20 MG DR capsule Take 1 capsule (20 mg) by mouth daily       propranolol (INDERAL) 10 MG tablet        tirzepatide-Weight Management (ZEPBOUND) 2.5 MG/0.5ML prefilled pen Inject 0.5 mLs (2.5 mg) subcutaneously every 7 days. For 4 weeks 2 mL 0     tirzepatide-Weight Management (ZEPBOUND) 5 MG/0.5ML prefilled pen Inject 0.5 mLs (5 mg) subcutaneously every 7 days. After completing 4 weeks of taking the 2.5mg dose 2 mL 2     acetaminophen (TYLENOL) 325 MG tablet Take 2 tablets (650 mg) by mouth every 4 hours as needed for mild pain or fever (greater than or equal to 38  C /100.4  F (oral) or 38.5  C/ 101.4  F (core).)       albuterol (PROAIR HFA/PROVENTIL HFA/VENTOLIN HFA) 108 (90 Base) MCG/ACT inhaler Inhale 2 puffs into the lungs every 6 hours as needed for shortness of breath or wheezing 18 g 1     budesonide-formoterol (SYMBICORT) 160-4.5 MCG/ACT Inhaler INHALE 2 PUFFS BY MOUTH TWICE A DAY RINSE MOUTH AFTER USE 30.6 g 1     ibuprofen (ADVIL/MOTRIN) 600 MG tablet Take 1 tablet (600 mg) by mouth every 6 hours as needed for moderate pain 3 tablet      phentermine 15 MG capsule Take 1 capsule (15 mg) by mouth every morning. 30 capsule 2     topiramate (TOPAMAX) 25 MG tablet 25mg at bedtime for week 1, 50mg at bedtime for 1 week, and 75mg at bedtime thereafter 90 tablet 3         antipsychotic medications- lamotrigine in the past, stopped recently     ALLERGIES:  Allergies   Allergen Reactions     Keflex  "[Cephalexin Hcl] Hives     Penicillins Other (See Comments) and Swelling     Throat swelling    Other Reaction(s): Swelling, lips/tongue     Prozac [Fluoxetine] Nausea     Patient requested a change               12/9/2024    11:22 AM   KULDEEP Score (Last Two)   KULDEEP Raw Score 33    Activation Score 65.8    KULDEEP Level 3        Patient-reported       FIB-4 Calculation: 0.26 at 2/7/2024  9:13 AM  Calculated from:  SGOT/AST: 17 U/L at 2/7/2024  9:13 AM  SGPT/ALT: 16 U/L at 2/7/2024  9:13 AM  Platelets: 419 10e3/uL at 2/7/2024  9:13 AM  Age: 28 years    Fib-4 < 1.3: No further evaluation at this point, unless other concerns    - If the Fib-4 is >2.67  Fibroscan and elective liver clinic referral    - Intermediate Fib-4 scores: Get a Fibroscan, consider repeating this in 1-2 years.    Anti-obesity medication ROS:    HEENT  Hx of glaucoma: No    Cardiovascular  CAD:No  HTN:No    Gastrointestinal  GERD:Yes  Constipation:Yes- if she doesn't drink coffee she will not have a bm for 2-3 days. If she does have coffee feels bms are 1-2 daily. Has used miralax in the past for constipation which was also helpful.   Liver Dz:No  H/O Pancreatitis:No    Psychiatric  Bipolar: No  Anxiety:Yes  Depression:Yes  Suicidal Ideation: No  History of alcohol/drug abuse: No  Hx of eating disorder:No    Endocrine  Personal or family hx of MTC or MEN2:No  Diabetes/prediabetes: No    Neurologic:  Hx of seizures: No  Hx of migraines: No  Memory Impairment: No  CVA history: No        History of kidney stones: No  Kidney disease: No  Current birth control: Yes - IUD   Plans of future pregnancy: none       Objective   Ht 1.626 m (5' 4\")   Wt 119.3 kg (263 lb)   BMI 45.14 kg/m           Vitals:  No vitals were obtained today due to virtual visit.    Physical Exam   GENERAL: alert and no distress  EYES: Eyes grossly normal to inspection.  No discharge or erythema, or obvious scleral/conjunctival abnormalities.  RESP: No audible wheeze, cough, or visible " cyanosis.    SKIN: Visible skin clear. No significant rash, abnormal pigmentation or lesions.  NEURO: Cranial nerves grossly intact.  Mentation and speech appropriate for age.  PSYCH: Appropriate affect, tone, and pace of words        In summary, Kena Sorto has Class III obesity with a body mass index of Body mass index is 45.14 kg/m . kg/m2 and the comorbidities stated above. She completed an informational seminar and is a possible candidate for the laparoscopic gastric sleeve.  She will have to complete the following pre-requisites:    Received weight loss goal of 10 lb prior to surgery.  Achieve clearance from dietitian to see surgeon.  Have preoperative laboratory tests drawn.  Psychological Evaluation with MMPI and clearance for weight loss surgery.  Letter of clearance from the following sleep, psychiatry,  primary care . Will also need to get established with a therapist.     Today in the office we discussed gastric sleeve surgery. Preoperative, perioperative, and postoperative processes, management, and follow up were addressed.  Risks and benefits were outlined including the risk of death, staple line leak (1-2%), PE, DVT, ulcer, worsening GERD, N/V, stricture, hernia, wound infection, weight regain, and vitamin deficiencies. I emphasized exercise and activity along with appropriate food choice as the main foundation for weight loss with surgery providing surgical reinforcement of this.  All questions were answered.  A goal sheet and support group handout were given to the patient.        If you have not already watched our online seminar please go to [email or web address]    Weight loss requirement: 10lbs prior to surgery. Goal Weight: 253, pending in person weight check. Will have final weight check 2-3 weeks prior to surgery at anesthesia or nurse pre-op teaching visit.    -Need current weight confirmation at primary clinic or weight management clinic : Yes    Bariatric labs ordered, call  for a lab only appointment at any [facility] lab. To find a lab location near you, please call [phone]. Please let us know if orders need to be faxed to a non [facility] lab.    Schedule bariatric psych eval as soon as possible.  List of psychologists will be sent to you via [employer] or given to you in clinic.     Call Kevan Weir at [phone] to discuss insurance coverage for bariatric surgery.  Please check with your insurance regarding bariatric surgery coverage also. Kevan can also help you with scheduling psych eval if you are having difficulties.    The following clearance letters are needed: Letter templates will be sent to you via [employer] or given to you in clinic. Providers can submit through electronic medical record or fax to [phone].  - Primary care provider, sleep medicine, psychiatrist. Also will need to get established with therapist.     Smoking cessation and nicotine test needed: No    Birth control after surgery discussed. Patient instructed that 2 forms of birth control required after surgery and to avoid pregnancy for at least 18 months after surgery: has IUD   NEXT VISITS: A  should reach out to you to schedule the following appointments.  If they do not reach you please call [phone] to schedule the following appointments:    -See dietitian in 1 month and monthly for 3 months    -See Danna in 3 months to follow up on pre-op weight loss and weight loss medications    -See Dr Cardona in 1 month for bariatric surgeon visit. Discuss bariatric surgery.     -Group Dietician visit scheduled     -New bariatric consult class scheduled     Anti-obesity medication started today for this patient   WEGOVY  No history of pancreatitis   No personal or family history of medullary thyroid carcinoma  No personal or family history of Multiple Endocrine Neoplasia Type 2  On birth control IUD   . Side effects and risks associated with this medication, as well as medication  administration instructions, were discussed. Patient expressed understanding and a willingness to proceed with starting this medication.       Potential anti-obesity medications for this patient the future if there are issues with cost or side effects  NALTREXONE  Did not discuss, could consider in the future   No history of liver disease  No chronic pain   No current opioid use   .  METFORMIN  Did not discuss, could consider in the future   No history of chronic kidney disease  GFR >45  .    The following medications could be considered with caution for this patient   PHENTERMINE  No history of hypertension  No history of CVA   No history of cardiovascular disease   No history of cardiac arrhythmias   No history of glaucoma  No history of drug abuse  No history of hyperthyroidism  Caution would be needed with this medication due to significant anxiety, would want to confirm with psychiatry prior to starting   .  TOPIRAMATE  No history of kidney stones  No history of glaucoma   No issues with memory  No chronic kidney disease   On birth control IUD   Caution would be needed with this medication due to depression, would want to confirm with psychiatrist prior to starting   .  BUPROPION  No history of bipolar disorder  No history of hypertension  No history of cardiovascular disease   No history of cardiac arrhythmias   No history of seizures  No history of bulimia nervosa  No history of anorexia nervosa  Caution would be needed with this medication due to anxiety, would want to confirm with psychiatry prior to starting    .       Once the patient has completed the requirements in their task list and there are no further recommendations, the pt will be allowed to see the surgeon of their choice for consultation on the laparoscopic gastric sleeve surgery. Patient verbalizes understanding of the process to surgery and expectations for the postoperative period including the need for lifelong lifestyle changes, vitamin  supplementation, and laboratory monitoring.    Medications that may contribute to the patient's obesity, such as antipsychotic medications, have been identified. Correctable endocrine disorders and other medical conditions have been ruled out, or are under successful treatment. Identified personal barriers to making and continuing needed life changes. Identified behavior changes/strategies to address personal barriers.    Sincerely,     Danna Garzon PA-C     The longitudinal plan of care for the diagnosis(es)/condition(s) as documented were addressed during this visit. Due to the added complexity in care, I will continue to support Kena in the subsequent management and with ongoing continuity of care.       Again, thank you for allowing me to participate in the care of your patient.      Sincerely,    Danna Garzon PA-C

## 2024-12-10 NOTE — TELEPHONE ENCOUNTER
PRIOR AUTHORIZATION DENIED    Medication: ZEPBOUND 2.5 MG/0.5ML SC SOAJ  Insurance Company: [employer] - Phone [phone] Fax [phone]  Denial Date:    Denial Reason(s):  Message from [employer]: Drug is not covered by plan   Appeal Information:        Key: [id number]

## 2024-12-10 NOTE — PATIENT INSTRUCTIONS
If you have not already watched our online seminar please go to [email or web address]    Weight loss requirement: 10lbs prior to surgery. Goal Weight: 253, pending in person weight check. Will have final weight check 2-3 weeks prior to surgery at anesthesia or nurse pre-op teaching visit.    -Need current weight confirmation at primary clinic or weight management clinic : Yes    Bariatric labs ordered, call for a lab only appointment at any [facility] lab. To find a lab location near you, please call [phone]. Please let us know if orders need to be faxed to a non [facility] lab.    Schedule bariatric psych eval as soon as possible.  List of psychologists will be sent to you via [email or web address] or given to you in clinic.     Call Kevan Weir at [phone] to discuss insurance coverage for bariatric surgery.  Please check with your insurance regarding bariatric surgery coverage also. Kevan can also help you with scheduling psych eval if you are having difficulties.    The following clearance letters are needed: Letter templates will be sent to you via [email or web address] or given to you in clinic. Providers can submit through electronic medical record or fax to [phone].  - Primary care provider, sleep medicine, psychiatrist. Also will need to get established with therapist.     Smoking cessation and nicotine test needed: No    Birth control after surgery discussed. Patient instructed that 2 forms of birth control required after surgery and to avoid pregnancy for at least 18 months after surgery: has IUD   NEXT VISITS: A  should reach out to you to schedule the following appointments.  If they do not reach you please call [phone] to schedule the following appointments:    -See dietitian in 1 month and monthly for 3 months    -See Danna in 3 months to follow up on pre-op weight loss and weight loss medications    -See Dr Cardona in 1 month for bariatric surgeon visit. Discuss bariatric  surgery.     -Group Dietician visit scheduled     -New bariatric consult class scheduled

## 2024-12-10 NOTE — TELEPHONE ENCOUNTER
I have left a message for Kena to call back to get scheduled for a psych consult with Sudhakar Mcduffie.  She is a [facility] Patient and call was at the request of Coordinator Kevan Weir

## 2024-12-10 NOTE — TELEPHONE ENCOUNTER
PRIOR AUTHORIZATION DENIED    Medication: WEGOVY 0.25 MG/0.5ML SC SOAJ  Insurance Company: [employer] Non-Specialty PA's - Phone [phone] Fax [phone]  Denial Date:  12/10/2024  Denial Reason(s): Message from [employer]: Drug is not covered by plan  Appeal Information:

## 2024-12-10 NOTE — NURSING NOTE
Is the patient currently in the state of [place]? YES    Location: work    Visit mode:VIDEO    If the visit is dropped, the patient can be reconnected by: VIDEO VISIT: Text to cell phone:   Telephone Information:   Mobile [phone]    and VIDEO VISIT: Send to e-mail at: [email or web address]    Will anyone else be joining the visit? NO  (If patient encounters technical issues they should call [phone])    Are changes needed to the allergy or medication list? No    Are refills needed on medications prescribed by this physician? NO    Reason for visit: Consult      Yudith Mcduffie, Virtual Visit Facilitator    QNR Status: completed

## 2024-12-10 NOTE — TELEPHONE ENCOUNTER
Called [employer] to check if the policy has coverage for bariatric surgery as patient was calling to schedule a pre-bariatric surgery psychological evaluation with Dr Mcduffie. There is no coverage for bariatric surgery. She can work with a dietitian as long as it is ordered by a physician. The policy will not cover classes or seminars except those for diabetic education.   Will notify patient of above.

## 2024-12-11 ENCOUNTER — TELEPHONE (OUTPATIENT)
Dept: ENDOCRINOLOGY | Facility: CLINIC | Age: 29
End: 2024-12-11
Payer: COMMERCIAL

## 2024-12-11 NOTE — TELEPHONE ENCOUNTER
Called patient at the request of Danna Garzon regarding the exclusion patient has on her [employer] policy for bariatric surgery. I asked her to call [employer] again, customer service number on the back of her insurance card, and give them the CPT code for the sleeve gastrectomy, 69164. I did tell her I was told there is no coverage but she can work with a dietitian if ordered by a physician.

## 2024-12-12 ENCOUNTER — CARE COORDINATION (OUTPATIENT)
Dept: ENDOCRINOLOGY | Facility: CLINIC | Age: 29
End: 2024-12-12
Payer: COMMERCIAL

## 2024-12-12 NOTE — LETTER
2024   Re: Kena Sorto   Address: [address] [address] [place] [address]   : 1995       Dear Therapist,     Kena has been diagnosed with morbid obesity and is considering undergoing bariatric surgery. A psychological evaluation is being done to see if this patient is cleared from a psychological perspective to undergo the elective surgery. However, we need your assistance with a letter of support as outlined below. Your input is valuable and will affect our decision to hold or proceed with bariatric surgery.     This letter of support should outline:     Summary of treatment to date.   Treatment goals for before and after surgery that indicate mental health support and continuation of care.   Any concerns regarding this patient's ability to follow through with treatment recommendations.   If known, documentation of cessation of illicit drug use (our policy requires patients to be drug free of illicit drugs for at least one year prior to surgery).   A statement that indicates if you support or do not support this patient for weight loss surgery.     If you have any questions, feel free to contact us via our [facility] at [phone]. Please fax the evaluation to the number below.     Sincerely,     Comprehensive Weight Management Team     [facility] Comprehensive [facility] Management [facility]   [facility]   [address], [address], [place], [place], [address]     Ph: [phone]   Fax: [phone]

## 2024-12-12 NOTE — LETTER
2024   Re: Kena Sorto   Address: [address] [place] [address] [address] [place] [id number]   : 1995       Dear Mental Health Provider,     Thank you for taking the time to see Kena for a preoperative psychological evaluation for bariatric surgery. The following elements are required by our program for screening patients for bariatric surgery:     1. Intake Assessment   Identifying data: reason for pursuing surgery.   History of Present Illness: weight loss history, typical eating patterns, exercise, and leisure time activities, coping skills.   Psychiatric History: If a mental health condition is present, documented verification is required noting that the condition is it under successful treatment.   Medical History: allergies, emergencies, head traumas, etc.   Family History   Social and Development History: abuse/neglect, level of functioning, education, employment.   Substance Abuse and Addictions: prescription and OTC meds/herbs, alcohol and related substances, nicotine, caffeine/pop, gambling, shopping. Absence of active substance use disorder.   Mental Status: appearance, behavior, mood, and affect, thought content and thought process. Is patient able to provide an informed consent?   Social Network: marriage, children, friends, current living situation. Family and social supports have been assessed, and strategies to strengthen those supports have been recommended.   Legal History       2. MMPI   3. Summary of current findings: emotional stability, conclusions, and recommendations.   4. Follow-up visit to discuss the results of the MMPI or other tests, conclusions, and recommendations.     The evaluation must confirm that the patient is emotionally stable to proceed with the surgery, cognitively capable of understanding the risks and realistic goals of the surgical procedure and prepared to comply with the long-term aftercare and behavioral changes expected after surgery.     We feel it is  important for our patients to establish a relationship with a mental health provider prior to surgery since the changes they experience postoperatively can be overwhelming. Therefore, having an established relationship is essential to help them cope effectively with these life-altering changes.     If you have any questions, feel free to contact us via our [facility] at [phone]. Please fax the evaluation to the number below.     Sincerely,     Comprehensive Weight Management Team     [facility] Comprehensive Weight Management Center   [facility]   [address], [address], [place], [place], [address]     Ph: [phone]   Fax: [phone]

## 2024-12-12 NOTE — LETTER
"    2024   Re: Kena Sorto   Address: [address] [address] [place] [address]   : 1995       Dear Primary Care Provider,     Thank you for coordinating with us to evaluate Kena for possible bariatric surgery.     It is important to us that the primary care provider is aware of their patients' desire to have weight loss surgery and is supportive of the patient having surgery. If the patient meets criteria and clearances for surgery, we will submit to the insurance company for insurance approval and coordinate the needed appointments with our department. If you have any questions, feel free to contact us via our [facility] at [phone]. The letter of support can be faxed to the number below or routed via [facility] [employer] to our team.       Sample letter:     \"Dear Weight Loss Surgery Clinic,     I am the primary care provider for (patient name) and I support (him/her/they) having weight loss surgery.     Sincerely,     (provider name)\"     Sincerely,     Comprehensive Weight Management Team     [facility] Comprehensive Weight Management Center   [facility]   [address], [address], [place], [address]     Ph: [phone]   Fax: [phone]       "

## 2024-12-12 NOTE — LETTER
2024   Re: Kena Sorto   Address: [address] [address] [place] [id number]   : 1995       Dear Sleep Medicine Provider,     Thank you for taking the time to see Kena for a pre-operative clearance evaluation for bariatric surgery. This patient is referred for clearance because they have known JERMAINE, have symptoms of JERMAINE or have a BMI > 50 per our program protocol.     Please assist us with the following during your evaluation of our mutual patient:     Initial patient evaluation   Arrange and expedite necessary testing.   Guide and facilitate follow-up treatment.   Provide a letter stating that the patient is cleared to proceed with bariatric surgery from a sleep medicine standpoint.   Indicate what treatment is needed pre-surgery (if any), during inpatient hospitalization and after surgery.     The evaluation must confirm that the patient is stable from a sleep medicine standpoint to proceed with the surgery. If you have any questions, feel free to contact us via our [facility] at [phone]. Please fax the evaluation to the number below.     Sincerely,     Comprehensive Weight Management Team     [facility] Comprehensive Weight Management Center   [place] [address], [place], [address]     Ph: [phone]   Fax: [phone]

## 2024-12-12 NOTE — LETTER
2024   Re: Kena Sorto   Address: [address] [place] [id number]   : 1995       Dear Psychiatrist,     Kena has been diagnosed with morbid obesity and is considering undergoing bariatric surgery. A psychological evaluation is being done to see if this patient is cleared from a psychological perspective to undergo the elective surgery. However, we need your assistance with a letter of support as outlined below. Your input is valuable and will affect our decision to hold or proceed with bariatric surgery.     This letter of support should outline:     Summary of treatment to date.   Treatment goals for before and after surgery that indicate mental health support and continuation of care.   Any concerns regarding this patient's ability to follow through with treatment recommendations.   If known, documentation of cessation of illicit drug use (our policy requires patients to be drug free of illicit drugs for at least one year prior to surgery).   A statement that indicates if you support or do not support this patient for weight loss surgery.     If you have any questions, feel free to contact us via our [facility] at [phone]. Please fax the evaluation to the number below.     Sincerely,     Comprehensive Weight Management Team     [facility] Comprehensive Weight Management Center   [facility]   [address], [address], [place], [place], [address]     Ph: [phone]   Fax: [phone]

## 2024-12-12 NOTE — PROGRESS NOTES
Bariatric Task List updated.  Bariatric information/clearance letters sent to patient via [employer].    Bariatric Task List    Fax:  Please fax all paperwork to: [phone] -     Status:  Is patient a candidate for bariatric surgery?:  patient is a candidate for bariatric surgery -     Cleared to schedule surgeon consult?:  cleared to schedule surgeon consult -     Status:  surgery evaluation in process -     Surgeon: Dr. Cardona -     Tentative surgery month/year: tbd -        Insurance: Insurance:  [employer] -      Contact insurance to discuss coverage: Needed -       Cigna: PCP Recommendation and Medical Clearance:    -     HP Referral:    -      Advanced beneficiary notification (ABN) for Medicare patients for RD visits   and surgery:   -      Weight history:   -     Other:    -        Patient Info: Initial Weight:  263 - pending in person confirmation   Date of Initial Weight/Height:  12/10/2024 - pending in person confirmation   Goal Weight (lbs):  253 - pending in person weight   Required Weight Loss:  10lb -     Surgery Type:  sleeve gastrectomy -     Multidisciplinary Meeting:    -        Dietician Visits: Structured weight loss required by insurance?:    -     Dietician Visit 1:  Needed -     Dietician Visit 2:  Needed -     Dietician Visit 3:  Needed -     Dietician Visit additional:  Needed - monthly until surgery   Clearance from dietician to see surgeon?:    -     Dietician Notes:    -        Psychological Evaluation: Psych eval:  Needed - List and letter sent to pt - AS   Therapist letter of support:  Needed - Letter sent to pt  12/12/24 - AS   Psychiatrist letter of support:  Needed - Letter sent to pt 12/12/24 - AS   Establish care with therapist:  Needed -     Complete eating disorder evaluation:    -     Letter of clearance from therapist/eating disorder program:    -     Other:    -        Lab Work: Complete Blood Count:  Needed - Ordered 12/10/24 - AS   Comprehensive Metabolic Panel:  Needed - Ordered  "12/10/24 - AS   Vitamin D:  Needed - Ordered 12/10/24 - AS   PTH:  Needed -     Hgb A1c:  Needed - Ordered 12/10/24 - AS    Lipids: Needed - Ordered 12/10/24 - AS   Vitamin A: Needed - Ordered 12/10/24 - AS   Vitamin B12: Needed - Ordered 12/10/24 - AS   Other:    -        Consults/ Clearance Sleep Medicine:  Needed - Letter sent to pt 12/12/24 - AS   Other:    -        PCP: Establish care with PCP:  Completed -     Follow up with PCP:    -     PCP letter of support:  Needed - Letter sent to pt 12/12/24 - AS      Patient Education:  Information Session:  Needed -     Attended New Consult Class?: Needed -     Given \"Making your decision\" handout?:  Yes -     Given \"A Roadmap to you Weight Loss Surgery\" handout?: Yes -     Given \"Epic Care Companion\" information?: Yes -     Attended support group?:  Needed -     Support plan in place?:  Needed -     Research consents signed?:    -     Avoid NSAIDS/ Alternate Plan for Pain:   -        Additional Surgery Requirements: Review Coag plan:    -     HgA1c <8:    -     Inpatient pain consult:    -     Final nicotine screen:    -     Dental work complete:    -     Birth control plan:  Completed - IUD   Gallstone prevention plan (Actigall for 6 months postop): Needed -     Other:   -     Other:   -        Final Tasks:  Before surgery online preop class:  Needed -     After surgery online class:    -     Nurse visit for information:  Needed -     Weight Check: Needed -     History and Physical: Pre-Assessment Clinic (PAC) -   Needed -     Final labs per clinic: Needed -     See MTM Pharmacist for medication review and plan for after surgery (if DM, transplant hx, greater than 10 meds):   -     Chest xray per clinic:   -     Electrocardiogram (ECG) per clinic:   -     Schedule postop appointments: Needed -     Other:   -   -        Notes:   -            "

## 2024-12-16 ENCOUNTER — TELEPHONE (OUTPATIENT)
Dept: ENDOCRINOLOGY | Facility: CLINIC | Age: 29
End: 2024-12-16
Payer: COMMERCIAL

## 2024-12-16 DIAGNOSIS — E66.01 CLASS 3 SEVERE OBESITY WITH SERIOUS COMORBIDITY AND BODY MASS INDEX (BMI) OF 40.0 TO 44.9 IN ADULT, UNSPECIFIED OBESITY TYPE (H): Primary | ICD-10-CM

## 2024-12-16 DIAGNOSIS — E66.813 CLASS 3 SEVERE OBESITY WITH SERIOUS COMORBIDITY AND BODY MASS INDEX (BMI) OF 40.0 TO 44.9 IN ADULT, UNSPECIFIED OBESITY TYPE (H): Primary | ICD-10-CM

## 2024-12-16 RX ORDER — PHENTERMINE HYDROCHLORIDE 15 MG/1
15 CAPSULE ORAL EVERY MORNING
Qty: 30 CAPSULE | Refills: 2 | Status: SHIPPED | OUTPATIENT
Start: 2024-12-16

## 2024-12-16 NOTE — TELEPHONE ENCOUNTER
Retail Pharmacy Prior Authorization Team   Phone: [phone]    INSURANCE CLOSED OUT EPA REQUEST DUE TO DRUG NOT COVERED BY PLAN

## 2025-01-13 ENCOUNTER — MYC REFILL (OUTPATIENT)
Dept: ENDOCRINOLOGY | Facility: CLINIC | Age: 30
End: 2025-01-13
Payer: COMMERCIAL

## 2025-01-13 DIAGNOSIS — E66.01 CLASS 3 SEVERE OBESITY WITH SERIOUS COMORBIDITY AND BODY MASS INDEX (BMI) OF 40.0 TO 44.9 IN ADULT, UNSPECIFIED OBESITY TYPE (H): ICD-10-CM

## 2025-01-13 DIAGNOSIS — E66.813 CLASS 3 SEVERE OBESITY WITH SERIOUS COMORBIDITY AND BODY MASS INDEX (BMI) OF 40.0 TO 44.9 IN ADULT, UNSPECIFIED OBESITY TYPE (H): ICD-10-CM

## 2025-01-15 RX ORDER — PHENTERMINE HYDROCHLORIDE 15 MG/1
15 CAPSULE ORAL EVERY MORNING
Qty: 30 CAPSULE | Refills: 2 | OUTPATIENT
Start: 2025-01-15

## 2025-02-07 ENCOUNTER — MYC MEDICAL ADVICE (OUTPATIENT)
Dept: ENDOCRINOLOGY | Facility: CLINIC | Age: 30
End: 2025-02-07
Payer: COMMERCIAL

## 2025-02-18 DIAGNOSIS — J45.30 MILD PERSISTENT ASTHMA WITHOUT COMPLICATION: ICD-10-CM

## 2025-02-18 RX ORDER — BUDESONIDE AND FORMOTEROL FUMARATE DIHYDRATE 160; 4.5 UG/1; UG/1
AEROSOL RESPIRATORY (INHALATION)
Qty: 10.2 G | Refills: 2 | Status: SHIPPED | OUTPATIENT
Start: 2025-02-18

## 2025-02-18 NOTE — TELEPHONE ENCOUNTER
Last visit with AE 5/20/24. OK for refill.    Armando Mix-PEPPER Waters  [facility]

## 2025-02-24 ENCOUNTER — PATIENT OUTREACH (OUTPATIENT)
Dept: CARE COORDINATION | Facility: CLINIC | Age: 30
End: 2025-02-24
Payer: COMMERCIAL

## 2025-03-06 ENCOUNTER — APPOINTMENT (OUTPATIENT)
Dept: GENERAL RADIOLOGY | Facility: CLINIC | Age: 30
End: 2025-03-06
Attending: STUDENT IN AN ORGANIZED HEALTH CARE EDUCATION/TRAINING PROGRAM
Payer: COMMERCIAL

## 2025-03-06 ENCOUNTER — HOSPITAL ENCOUNTER (EMERGENCY)
Facility: CLINIC | Age: 30
Discharge: HOME | End: 2025-03-06
Attending: STUDENT IN AN ORGANIZED HEALTH CARE EDUCATION/TRAINING PROGRAM | Admitting: STUDENT IN AN ORGANIZED HEALTH CARE EDUCATION/TRAINING PROGRAM
Payer: COMMERCIAL

## 2025-03-06 VITALS
SYSTOLIC BLOOD PRESSURE: 127 MMHG | OXYGEN SATURATION: 100 % | HEIGHT: 64 IN | RESPIRATION RATE: 18 BRPM | DIASTOLIC BLOOD PRESSURE: 72 MMHG | TEMPERATURE: 98.2 F | BODY MASS INDEX: 44.9 KG/M2 | WEIGHT: 263 LBS | HEART RATE: 94 BPM

## 2025-03-06 DIAGNOSIS — J01.90 ACUTE RHINOSINUSITIS: ICD-10-CM

## 2025-03-06 PROCEDURE — 250N000012 HC RX MED GY IP 250 OP 636 PS 637: Performed by: STUDENT IN AN ORGANIZED HEALTH CARE EDUCATION/TRAINING PROGRAM

## 2025-03-06 PROCEDURE — 250N000009 HC RX 250: Performed by: STUDENT IN AN ORGANIZED HEALTH CARE EDUCATION/TRAINING PROGRAM

## 2025-03-06 PROCEDURE — 94640 AIRWAY INHALATION TREATMENT: CPT

## 2025-03-06 PROCEDURE — 71046 X-RAY EXAM CHEST 2 VIEWS: CPT

## 2025-03-06 PROCEDURE — 99284 EMERGENCY DEPT VISIT MOD MDM: CPT | Mod: 25 | Performed by: STUDENT IN AN ORGANIZED HEALTH CARE EDUCATION/TRAINING PROGRAM

## 2025-03-06 PROCEDURE — 99284 EMERGENCY DEPT VISIT MOD MDM: CPT | Performed by: STUDENT IN AN ORGANIZED HEALTH CARE EDUCATION/TRAINING PROGRAM

## 2025-03-06 RX ORDER — AZITHROMYCIN 250 MG/1
TABLET, FILM COATED ORAL
Qty: 6 TABLET | Refills: 0 | Status: SHIPPED | OUTPATIENT
Start: 2025-03-06 | End: 2025-03-11

## 2025-03-06 RX ORDER — PREDNISONE 20 MG/1
40 TABLET ORAL ONCE
Status: COMPLETED | OUTPATIENT
Start: 2025-03-06 | End: 2025-03-06

## 2025-03-06 RX ORDER — IPRATROPIUM BROMIDE AND ALBUTEROL SULFATE 2.5; .5 MG/3ML; MG/3ML
1 SOLUTION RESPIRATORY (INHALATION) EVERY 6 HOURS PRN
Qty: 90 ML | Refills: 0 | Status: SHIPPED | OUTPATIENT
Start: 2025-03-06

## 2025-03-06 RX ORDER — FLUTICASONE PROPIONATE 50 MCG
1 SPRAY, SUSPENSION (ML) NASAL DAILY
Qty: 16 G | Refills: 0 | Status: SHIPPED | OUTPATIENT
Start: 2025-03-06

## 2025-03-06 RX ORDER — METHYLPREDNISOLONE 4 MG/1
TABLET ORAL
Qty: 21 TABLET | Refills: 0 | Status: SHIPPED | OUTPATIENT
Start: 2025-03-06 | End: 2025-03-06

## 2025-03-06 RX ORDER — OSELTAMIVIR PHOSPHATE 75 MG/1
75 CAPSULE ORAL 2 TIMES DAILY
Qty: 10 CAPSULE | Refills: 0 | Status: SHIPPED | OUTPATIENT
Start: 2025-03-06 | End: 2025-03-11

## 2025-03-06 RX ORDER — PREDNISONE 20 MG/1
TABLET ORAL
Qty: 10 TABLET | Refills: 0 | Status: SHIPPED | OUTPATIENT
Start: 2025-03-06

## 2025-03-06 RX ORDER — ALBUTEROL SULFATE 90 UG/1
2 INHALANT RESPIRATORY (INHALATION) EVERY 6 HOURS PRN
Qty: 18 G | Refills: 0 | Status: SHIPPED | OUTPATIENT
Start: 2025-03-06

## 2025-03-06 RX ORDER — IPRATROPIUM BROMIDE AND ALBUTEROL SULFATE 2.5; .5 MG/3ML; MG/3ML
3 SOLUTION RESPIRATORY (INHALATION) ONCE
Status: COMPLETED | OUTPATIENT
Start: 2025-03-06 | End: 2025-03-06

## 2025-03-06 RX ADMIN — IPRATROPIUM BROMIDE AND ALBUTEROL SULFATE 3 ML: .5; 3 SOLUTION RESPIRATORY (INHALATION) at 09:17

## 2025-03-06 RX ADMIN — PREDNISONE 40 MG: 20 TABLET ORAL at 09:09

## 2025-03-06 ASSESSMENT — COLUMBIA-SUICIDE SEVERITY RATING SCALE - C-SSRS
1. IN THE PAST MONTH, HAVE YOU WISHED YOU WERE DEAD OR WISHED YOU COULD GO TO SLEEP AND NOT WAKE UP?: NO
2. HAVE YOU ACTUALLY HAD ANY THOUGHTS OF KILLING YOURSELF IN THE PAST MONTH?: NO
6. HAVE YOU EVER DONE ANYTHING, STARTED TO DO ANYTHING, OR PREPARED TO DO ANYTHING TO END YOUR LIFE?: NO

## 2025-03-06 ASSESSMENT — ENCOUNTER SYMPTOMS
CHILLS: 1
FATIGUE: 1
COUGH: 1
SHORTNESS OF BREATH: 1
SINUS PRESSURE: 1
SINUS PAIN: 1
WHEEZING: 1

## 2025-03-06 ASSESSMENT — ACTIVITIES OF DAILY LIVING (ADL)
ADLS_ACUITY_SCORE: 46
ADLS_ACUITY_SCORE: 46

## 2025-03-06 NOTE — ED PROVIDER NOTES
History     Chief Complaint   Patient presents with    Facial Pain     HPI  Kena Sorto is a 29 year old female who presenting with nasal congestion cough and chest tightness for the past 24 hours.  She notes her daughter recently tested positive for influenza A about 3 to 4 days ago.  She is having some minor chills and associated intermittent sore throat decreased appetite and bodyaches.  Has a history of asthma.  Currently only treats this with Symbicort.    Allergies:  Allergies   Allergen Reactions    Keflex [Cephalexin Hcl] Hives    Penicillins Other (See Comments) and Swelling     Throat swelling    Other Reaction(s): Swelling, lips/tongue    Prozac [Fluoxetine] Nausea     Patient requested a change       Problem List:    Patient Active Problem List    Diagnosis Date Noted    Spider vein of left lower extremity 07/30/2024     Priority: Medium    History of postpartum depression 07/05/2018     Priority: Medium    Seasonal allergic rhinitis 04/06/2017     Priority: Medium    Major depressive disorder, recurrent episode, mild 08/24/2016     Priority: Medium    Mild persistent asthma without complication 08/24/2016     Priority: Medium    Severe obesity (BMI 35.0-39.9) 05/02/2012     Priority: Medium    Chronic adenoiditis 03/03/2010     Priority: Medium     Per ENT visit      Attention deficit disorder 01/31/2008     Priority: Medium    Generalized anxiety disorder 01/31/2008     Priority: Medium        Past Medical History:    Past Medical History:   Diagnosis Date    Chronic adenoiditis 03/03/2010    Encounter for insertion of Mirena IUD 10/02/2018    Hypertrophy of nasal turbinates 03/03/2010    Iron deficiency anemia, unspecified     Mild intermittent asthma     Plantar fasciitis 03/15/2012    Pneumonia, organism unspecified(486)     Post partum depression 07/05/2018    Pre-eclampsia, severe, antepartum, third trimester 03/22/2022    Seasonal allergies        Past Surgical History:    Past Surgical  History:   Procedure Laterality Date    COLONOSCOPY N/A 5/9/2024    Procedure: Colonoscopy;  Surgeon: Al Nowak DO;  Location: [facility]    ESOPHAGOSCOPY, GASTROSCOPY, DUODENOSCOPY (EGD), COMBINED N/A 5/9/2024    Procedure: Esophagoscopy, gastroscopy, duodenoscopy (EGD), combined;  Surgeon: Al Nowak DO;  Location: [facility]    HC THERAPUTIC FRACTURE INFER TURBINATE  03/30/10    TONSILLECTOMY & ADENOIDECTOMY  03/30/10    turbinoplasty       Family History:    Family History   Problem Relation Age of Onset    Diabetes Mother     Hypertension Mother     Hyperlipidemia Mother     Deep Vein Thrombosis Mother     Gastrointestinal Disease Father     Heart Disease Father     Back Pain Father     Deep Vein Thrombosis Father     Anxiety Disorder Sister     Depression Sister     Hearing Loss Sister         congenital    Coronary Artery Disease Maternal Grandmother     Cerebrovascular Disease Maternal Grandmother     Diabetes Maternal Grandfather         Type II    Cerebrovascular Disease Maternal Grandfather     Cancer Paternal Grandmother         lung (she was a smoker)    Coronary Artery Disease Paternal Grandfather         aortic aneurysm    Food Allergy Daughter 3        peanut    Allergies Daughter     Asthma Daughter        Social History:  Marital Status:  Single [1]  Social History     Tobacco Use    Smoking status: Former     Current packs/day: 0.00     Types: Cigarettes     Quit date: 2022     Years since quitting: 3.1    Smokeless tobacco: Never    Tobacco comments:     no smokers in the household   Vaping Use    Vaping status: Never Used   Substance Use Topics    Alcohol use: No    Drug use: No        Medications:    albuterol (PROAIR HFA/PROVENTIL HFA/VENTOLIN HFA) 108 (90 Base) MCG/ACT inhaler  azithromycin (ZITHROMAX) 250 MG tablet  budesonide-formoterol (SYMBICORT/BREYNA) 160-4.5 MCG/ACT Inhaler  fluticasone (FLONASE) 50 MCG/ACT nasal spray  ipratropium - albuterol 0.5 mg/2.5 mg/3 mL  "(DUONEB) 0.5-2.5 (3) MG/3ML neb solution  acetaminophen (TYLENOL) 325 MG tablet  albuterol (PROAIR HFA/PROVENTIL HFA/VENTOLIN HFA) 108 (90 Base) MCG/ACT inhaler  cetirizine (ZYRTEC ALLERGY) 10 MG tablet  cholecalciferol (VITAMIN D3) 125 mcg (5000 units) capsule  clobetasol propionate (TEMOVATE) 0.05 % external cream  desvenlafaxine succinate (PRISTIQ) 25 MG 24 hr tablet  ibuprofen (ADVIL/MOTRIN) 600 MG tablet  omeprazole (PRILOSEC) 20 MG DR capsule  oseltamivir (TAMIFLU) 75 MG capsule  phentermine 15 MG capsule  predniSONE (DELTASONE) 20 MG tablet  propranolol (INDERAL) 10 MG tablet  tirzepatide-Weight Management (ZEPBOUND) 2.5 MG/0.5ML prefilled pen  tirzepatide-Weight Management (ZEPBOUND) 5 MG/0.5ML prefilled pen  topiramate (TOPAMAX) 25 MG tablet          Review of Systems   Constitutional:  Positive for chills and fatigue.   HENT:  Positive for sinus pressure, sinus pain and sneezing.    Respiratory:  Positive for cough, shortness of breath and wheezing.    All other systems reviewed and are negative.      Physical Exam   BP: 127/72  Pulse: 94  Temp: 98.2  F (36.8  C)  Resp: 18  Height: 162.6 cm (5' 4\")  Weight: 119.3 kg (263 lb)  SpO2: 100 %      Physical Exam  Vitals and nursing note reviewed.   Constitutional:       General: She is not in acute distress.     Appearance: Normal appearance. She is not diaphoretic.   HENT:      Head: Normocephalic and atraumatic.      Mouth/Throat:      Mouth: Mucous membranes are moist.   Eyes:      General: No scleral icterus.     Conjunctiva/sclera: Conjunctivae normal.   Cardiovascular:      Rate and Rhythm: Normal rate.      Heart sounds: Normal heart sounds.   Pulmonary:      Effort: Pulmonary effort is normal. No respiratory distress.      Breath sounds: Wheezing and rales present.   Abdominal:      General: Abdomen is flat. Bowel sounds are normal.      Palpations: Abdomen is soft.   Musculoskeletal:      Cervical back: Neck supple.   Skin:     General: Skin is warm.    "   Capillary Refill: Capillary refill takes less than 2 seconds.      Findings: No rash.   Neurological:      General: No focal deficit present.      Mental Status: She is alert and oriented to person, place, and time.   Psychiatric:         Mood and Affect: Mood normal.         ED Course        Procedures                Results for orders placed or performed during the hospital encounter of 03/06/25 (from the past 24 hours)   XR Chest 2 Views    Narrative    EXAM: XR CHEST 2 VIEWS  LOCATION: [facility]  DATE: 3/6/2025    INDICATION: Asthma Exacerbation  Flu A  COMPARISON: Chest x-ray 11/7/2005      Impression    IMPRESSION: Normal heart size and pulmonary vasculature. Patchy interstitial and reticular opacities both lungs suspicious for airway inflammation given clinical history. No consolidative opacity. No pleural effusion or pneumothorax.       Medications   ipratropium - albuterol 0.5 mg/2.5 mg/3 mL (DUONEB) neb solution 3 mL (3 mLs Nebulization $Given 3/6/25 0977)   predniSONE (DELTASONE) tablet 40 mg (40 mg Oral $Given 3/6/25 0936)       Assessments & Plan (with Medical Decision Making)     I have reviewed the nursing notes.    I have reviewed the findings, diagnosis, plan and need for follow up with the patient.    Medical Decision Making  Kena Sorto is a 29 year old female who presenting with nasal congestion cough and chest tightness for the past 24 hours.  She notes her daughter recently tested positive for influenza A about 3 to 4 days ago.  She is having some minor chills and associated intermittent sore throat decreased appetite and bodyaches.  Has a history of asthma.  Currently only treats this with Symbicort.    Patient on examination has wheezing bilaterally on chest examination.  She has some mild sinus tenderness.  No significant erythema of the throat.  She otherwise pleasant and throughout the examination with no other acute findings.  Her vitals are reassuring  with a blood pressure of 127/72 temperature 98.2 pulse of 94 and oxygen saturation at 100% on room air.  Chest imaging shows bilateral opacities suspicious for inflammation consistent with patient's history of asthma with associated likely viral illness.  We discussed swabbing however patient notes with her exposure she likely has influenza A and does not feel like swabbing is necessary at this point.  We discussed Tamiflu medication of which patient was open to.  We also discussed azithromycin for sinus congestion is been ongoing for the past week which could be contribute to patient's symptoms with this acute sinusitis.  With her asthma exacerbation also benefit from steroids and a short course of antibiotics for continued management along with DuoNebs and albuterol inhaler.  Flonase also provided for patient sinusitis.  Otherwise patient had significant improvement after DuoNeb.  Provided DuoNebs solution along with a mask the patient used today.  Otherwise no acute concerns were present and patient felt comfortable plan and will continue her treatments as we discussed at bedside.  Benefits of discussed of all medications and patient discharged home      New Prescriptions    ALBUTEROL (PROAIR HFA/PROVENTIL HFA/VENTOLIN HFA) 108 (90 BASE) MCG/ACT INHALER    Inhale 2 puffs into the lungs every 6 hours as needed for shortness of breath, wheezing or cough.    AZITHROMYCIN (ZITHROMAX) 250 MG TABLET    Take 2 tablets (500 mg) by mouth daily for 1 day, THEN 1 tablet (250 mg) daily for 4 days.    FLUTICASONE (FLONASE) 50 MCG/ACT NASAL SPRAY    Spray 1 spray into both nostrils daily.    IPRATROPIUM - ALBUTEROL 0.5 MG/2.5 MG/3 ML (DUONEB) 0.5-2.5 (3) MG/3ML NEB SOLUTION    Take 1 vial (3 mLs) by nebulization every 6 hours as needed for shortness of breath, wheezing or cough.    OSELTAMIVIR (TAMIFLU) 75 MG CAPSULE    Take 1 capsule (75 mg) by mouth 2 times daily for 5 days.    PREDNISONE (DELTASONE) 20 MG TABLET    Take two  tablets (= 40mg) each day for 5 (five) days       Final diagnoses:   Acute rhinosinusitis       3/6/2025   [facility] EMERGENCY DEPT       Zane Granger MD  03/06/25 0978

## 2025-03-06 NOTE — ED TRIAGE NOTES
Pt had flu last week, now facial sinus pain having productive cough and nasal drainage, green sputum, and fevers started last night.      Triage Assessment (Adult)       Row Name 03/06/25 0842          Triage Assessment    Airway WDL WDL        Respiratory WDL    Respiratory WDL X;cough;rhythm/pattern     Cough Frequency frequent     Cough Type productive        Skin Circulation/Temperature WDL    Skin Circulation/Temperature WDL WDL        Cardiac WDL    Cardiac WDL WDL        Peripheral/Neurovascular WDL    Peripheral Neurovascular WDL WDL        Cognitive/Neuro/Behavioral WDL    Cognitive/Neuro/Behavioral WDL WDL

## 2025-03-06 NOTE — DISCHARGE INSTRUCTIONS
You are seen today and found to be suffering from a likely asthma exacerbation as well as your exposure to your daughter with influenza A are likely suffering from that as we discussed.  You can go ahead and start Tamiflu which is helpful in regards to flu symptoms and with your history of asthma may be of benefit to you due to increased risk of hospitalization.  Otherwise please continue your steroid tomorrow and can use your Flonase as needed twice daily.  You can initiate your azithromycin as well which is an antibiotic.  If you start having nausea and vomiting is likely secondary Tamiflu recommend discontinuing it at this point with the antibiotic and recommend daily yogurt to help with GI probiotic.  Otherwise please return if you need any acute concerns.  DuoNebs were sent to your pharmacy.  Feel free to take the nebulizing mask with you and attach this to the nebulizer that you already have and continue to use the DuoNebs every 6 hours for the next 2 to 3 days to allow improvement of your shortness of breath and chest tightness.  Then you can alternate back to your albuterol for coughing every 2-4 hours.

## 2025-03-06 NOTE — Clinical Note
Kena Sorto was seen and treated in our emergency department on 3/6/2025.  She may return to work on 03/07/2025.       If you have any questions or concerns, please don't hesitate to call.      Zane Granger MD

## 2025-03-19 ENCOUNTER — TELEPHONE (OUTPATIENT)
Dept: ENDOCRINOLOGY | Facility: CLINIC | Age: 30
End: 2025-03-19
Payer: COMMERCIAL

## 2025-03-19 NOTE — TELEPHONE ENCOUNTER
Left Voicemail (1st Attempt) and Sent [email or web address] (1st Attempt) for the patient to call back and schedule the following:    Appointment type: Return Weight Management  Appointment mode: In Person or Virtual Visit  Provider: Sidra Smalls NP, Kassidy Weiss PA-C, or Carol Ramirez PA-C  Return date: Approx. Next available  Specialty phone number: direct line  and [phone]     Additional Notes:   Reschedule 4/2/25 visit with Jennifer Garzon with another provider.

## 2025-03-25 ENCOUNTER — E-VISIT (OUTPATIENT)
Dept: FAMILY MEDICINE | Facility: OTHER | Age: 30
End: 2025-03-25
Payer: COMMERCIAL

## 2025-03-25 DIAGNOSIS — N76.0 ACUTE VAGINITIS: Primary | ICD-10-CM

## 2025-03-25 RX ORDER — FLUCONAZOLE 150 MG/1
150 TABLET ORAL ONCE
Qty: 1 TABLET | Refills: 0 | Status: SHIPPED | OUTPATIENT
Start: 2025-03-25 | End: 2025-03-25

## 2025-05-29 ENCOUNTER — MYC REFILL (OUTPATIENT)
Dept: ENDOCRINOLOGY | Facility: CLINIC | Age: 30
End: 2025-05-29
Payer: COMMERCIAL

## 2025-05-29 DIAGNOSIS — E66.813 CLASS 3 SEVERE OBESITY WITH SERIOUS COMORBIDITY AND BODY MASS INDEX (BMI) OF 40.0 TO 44.9 IN ADULT, UNSPECIFIED OBESITY TYPE (H): ICD-10-CM

## 2025-05-29 RX ORDER — PHENTERMINE HYDROCHLORIDE 15 MG/1
15 CAPSULE ORAL EVERY MORNING
Qty: 30 CAPSULE | Refills: 2 | Status: CANCELLED | OUTPATIENT
Start: 2025-05-29

## 2025-05-29 NOTE — CONFIDENTIAL NOTE
Patient has appointment tomorrow. Will wait to discuss at that time, as patient would like a dose increase.

## 2025-05-30 DIAGNOSIS — E66.813 CLASS 3 SEVERE OBESITY WITH SERIOUS COMORBIDITY AND BODY MASS INDEX (BMI) OF 40.0 TO 44.9 IN ADULT, UNSPECIFIED OBESITY TYPE (H): ICD-10-CM

## 2025-05-30 RX ORDER — PHENTERMINE HYDROCHLORIDE 15 MG/1
15 CAPSULE ORAL EVERY MORNING
Qty: 30 CAPSULE | Refills: 2 | Status: CANCELLED | OUTPATIENT
Start: 2025-05-30

## 2025-06-17 ENCOUNTER — TELEPHONE (OUTPATIENT)
Dept: ENDOCRINOLOGY | Facility: CLINIC | Age: 30
End: 2025-06-17
Payer: COMMERCIAL

## 2025-06-17 NOTE — TELEPHONE ENCOUNTER
Left Voicemail (1st Attempt) and Sent Mychart (1st Attempt) for the patient to call back and schedule the following:    Appointment type: RET [facility] - Per DAISY Mas   Provider: DAISY Orlando   Return date: ~ 8/30 Approx.   Specialty phone number: [phone]

## 2025-07-03 ENCOUNTER — PATIENT OUTREACH (OUTPATIENT)
Dept: CARE COORDINATION | Facility: CLINIC | Age: 30
End: 2025-07-03
Payer: COMMERCIAL

## 2025-07-17 ENCOUNTER — PATIENT OUTREACH (OUTPATIENT)
Dept: CARE COORDINATION | Facility: CLINIC | Age: 30
End: 2025-07-17
Payer: COMMERCIAL

## 2025-08-04 DIAGNOSIS — E66.813 CLASS 3 SEVERE OBESITY WITH SERIOUS COMORBIDITY AND BODY MASS INDEX (BMI) OF 40.0 TO 44.9 IN ADULT, UNSPECIFIED OBESITY TYPE (H): ICD-10-CM

## 2025-08-04 RX ORDER — PHENTERMINE HYDROCHLORIDE 15 MG/1
15 CAPSULE ORAL EVERY MORNING
Qty: 30 CAPSULE | Refills: 3 | Status: SHIPPED | OUTPATIENT
Start: 2025-08-04

## 2025-08-04 RX ORDER — TOPIRAMATE 25 MG/1
TABLET, FILM COATED ORAL
Qty: 90 TABLET | Refills: 3 | Status: SHIPPED | OUTPATIENT
Start: 2025-08-04

## (undated) DEVICE — KIT ENDO TURNOVER/PROCEDURE CARRY-ON 101822

## (undated) DEVICE — SOL WATER IRRIG 1000ML BOTTLE 07139-09

## (undated) DEVICE — GLOVE EXAM NITRILE LG

## (undated) DEVICE — LUBRICATING JELLY 4.25OZ

## (undated) DEVICE — TUBING SUCTION 12"X1/4" N612